# Patient Record
Sex: MALE | Race: WHITE | HISPANIC OR LATINO | Employment: UNEMPLOYED | URBAN - METROPOLITAN AREA
[De-identification: names, ages, dates, MRNs, and addresses within clinical notes are randomized per-mention and may not be internally consistent; named-entity substitution may affect disease eponyms.]

---

## 2017-03-29 ENCOUNTER — HOSPITAL ENCOUNTER (EMERGENCY)
Facility: HOSPITAL | Age: 57
Discharge: HOME | End: 2017-03-29
Attending: EMERGENCY MEDICINE
Payer: COMMERCIAL

## 2017-03-29 VITALS
WEIGHT: 208 LBS | OXYGEN SATURATION: 96 % | SYSTOLIC BLOOD PRESSURE: 160 MMHG | HEIGHT: 65 IN | DIASTOLIC BLOOD PRESSURE: 80 MMHG | HEART RATE: 60 BPM | RESPIRATION RATE: 20 BRPM | BODY MASS INDEX: 34.66 KG/M2 | TEMPERATURE: 98 F

## 2017-03-29 DIAGNOSIS — M54.16 LUMBAR BACK PAIN WITH RADICULOPATHY AFFECTING RIGHT LOWER EXTREMITY: ICD-10-CM

## 2017-03-29 DIAGNOSIS — M54.10 RADICULAR PAIN OF LOWER EXTREMITY: Primary | ICD-10-CM

## 2017-03-29 PROCEDURE — 96372 THER/PROPH/DIAG INJ SC/IM: CPT

## 2017-03-29 PROCEDURE — 99283 EMERGENCY DEPT VISIT LOW MDM: CPT | Mod: 25

## 2017-03-29 PROCEDURE — 63600175 PHARM REV CODE 636 W HCPCS: Performed by: PHYSICIAN ASSISTANT

## 2017-03-29 RX ORDER — KETOROLAC TROMETHAMINE 30 MG/ML
15 INJECTION, SOLUTION INTRAMUSCULAR; INTRAVENOUS
Status: COMPLETED | OUTPATIENT
Start: 2017-03-29 | End: 2017-03-29

## 2017-03-29 RX ORDER — DEXAMETHASONE SODIUM PHOSPHATE 4 MG/ML
8 INJECTION, SOLUTION INTRA-ARTICULAR; INTRALESIONAL; INTRAMUSCULAR; INTRAVENOUS; SOFT TISSUE
Status: COMPLETED | OUTPATIENT
Start: 2017-03-29 | End: 2017-03-29

## 2017-03-29 RX ORDER — IBUPROFEN 200 MG
200 TABLET ORAL EVERY 6 HOURS PRN
COMMUNITY
End: 2017-03-29

## 2017-03-29 RX ORDER — IBUPROFEN 600 MG/1
600 TABLET ORAL EVERY 6 HOURS PRN
Qty: 20 TABLET | Refills: 0 | Status: SHIPPED | OUTPATIENT
Start: 2017-03-29 | End: 2017-04-03

## 2017-03-29 RX ORDER — TAMSULOSIN HYDROCHLORIDE 0.4 MG/1
0.4 CAPSULE ORAL DAILY
COMMUNITY
End: 2023-08-04 | Stop reason: CLARIF

## 2017-03-29 RX ORDER — AMOXICILLIN 500 MG
2 CAPSULE ORAL DAILY
Status: ON HOLD | COMMUNITY
End: 2023-08-04 | Stop reason: DRUGHIGH

## 2017-03-29 RX ADMIN — DEXAMETHASONE SODIUM PHOSPHATE 8 MG: 4 INJECTION, SOLUTION INTRAMUSCULAR; INTRAVENOUS at 01:03

## 2017-03-29 RX ADMIN — KETOROLAC TROMETHAMINE 15 MG: 30 INJECTION, SOLUTION INTRAMUSCULAR at 01:03

## 2017-03-29 NOTE — ED TRIAGE NOTES
Patient comes to the ER with right leg pain. Patient states certain positions makes it worse. Patient has been taking 3 ibuprofens 200mg with no relief. nontender to touch. Patient states feels likes leg is swelling.

## 2017-03-29 NOTE — ED PROVIDER NOTES
Encounter Date: 3/29/2017    SCRIBE #1 NOTE: I, Henry Mata, am scribing for, and in the presence of,  Beverly Sandy PA-C. I have scribed the following portions of the note - Other sections scribed: HPI and ROS.       History     Chief Complaint   Patient presents with    Back Pain     Pt. presents with right lower back pain with radiating pain and numbness to the right leg. Pt. reports the pain and numbness was intermittent and today has been constant. Pt. reports also certain positions make the pain worse.      Review of patient's allergies indicates:  No Known Allergies  HPI Comments: CC: Back Pain        HPI: This 56 y.o male pt with a PMHX of HIV, HTN, prostate disorder, and high cholesterol presents to the ED with c/o acute onset of inermittent left side back pain and right-sided back pain radiating down his right lower extremity with associated constant right lower extremity numbness that has been ongoing x4 days. Pt reports he has dealt with chronic low back pain since he was 16 when he fell off a horse. Symptoms are acute in onset and severe(10/10). Pain is described as achy. Pt also complains of a tingling sensation in his right toes. 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 Pt denies leg swelling, chest pain, SOB, calf pain, weakness, and fever. There are no alleviating factors. No Tx PTA. Pt has no known allergies.     The history is provided by the patient. No  was used.     Past Medical History:   Diagnosis Date    High cholesterol     HIV disease     Hypertension     Prostate disorder      History reviewed. No pertinent surgical history.  History reviewed. No pertinent family history.  Social History   Substance Use Topics    Smoking status: Never Smoker    Smokeless tobacco: None    Alcohol use No     Review of Systems   Constitutional: Negative for fever.   HENT: Negative for congestion, ear pain,  rhinorrhea and sore throat.    Eyes: Negative for redness.   Respiratory: Negative for shortness of breath.    Cardiovascular: Negative for chest pain and leg swelling.   Gastrointestinal: Negative for abdominal pain, nausea and vomiting.   Genitourinary: Negative for dysuria, hematuria, penile pain, penile swelling, scrotal swelling and testicular pain.   Musculoskeletal: Positive for arthralgias (right leg) and back pain (bilateral). Negative for gait problem and joint swelling.   Skin: Negative for rash.   Neurological: Positive for tremors (right toes) and numbness (right leg). Negative for weakness and headaches.   Hematological: Does not bruise/bleed easily.       Physical Exam   Initial Vitals   BP Pulse Resp Temp SpO2   03/29/17 1143 03/29/17 1143 03/29/17 1143 03/29/17 1143 03/29/17 1143   180/79 59 17 98.6 °F (37 °C) 96 %     Physical Exam    Constitutional: He appears well-developed and well-nourished. No distress.   HENT:   Head: Normocephalic.   Right Ear: External ear normal.   Left Ear: External ear normal.   Nose: Nose normal.   Mouth/Throat: Oropharynx is clear and moist.   Eyes: Conjunctivae are normal.   Cardiovascular: Normal rate and regular rhythm. Exam reveals no gallop and no friction rub.    No murmur heard.  Pulses:       Dorsalis pedis pulses are 2+ on the right side, and 2+ on the left side.   Pulmonary/Chest: Breath sounds normal. He has no wheezes. He has no rhonchi. He has no rales.   Abdominal: Soft. Bowel sounds are normal. He exhibits no distension. There is no tenderness. There is no rebound and no guarding.   Musculoskeletal: He exhibits no edema.   Tenderness to palpation of bilateral lumbar paraspinal musculature. No midline tenderness, step-offs or crepitus. 5/5 strength in BL UE and LE.  No R sided calf tenderness. No swelling or erythema of R thigh or calf. +Numbness in right buttock and RLE and bilateral upper inguinal regions.   Neurological: He is alert. He has normal  reflexes. No sensory deficit.   Reflex Scores:       Patellar reflexes are 2+ on the right side and 2+ on the left side.  Skin: Skin is warm and dry. No rash noted. No erythema.   Psychiatric: He has a normal mood and affect.         ED Course   Procedures  Labs Reviewed - No data to display          Medical Decision Making:   Initial Assessment:   Pt is a 57 y/o male who presents for acute exacerbation of chronic low back pain with associated numbness in RLE and reported swelling.  Pt is afebrile in NAD. On exam, there is tenderness to palpation of paraspinal lumbar musculature. Numbness to entire RLE and bilateral upper inguinal regions. No sensory deficits. Distal pulses intact. No thigh or calf tenderness or RLE swelling-I doubt DVT. No bowel or bladder incontinence or saddle anesthesia- doubt Cauda Equina. This is likely lumbar back pain with radiculopathy. Pt given decadron and toradol in ED. Discharged home in stable condition with Ibuprofen. PCP follow up. Return to ER if symptoms worsen, if develop bowel or bladder incontinence, saddle anesthesia or as needed.     I discussed this pt with Dr. Rhoades and he agrees with assessment and plan.             Scribe Attestation:   Scribe #1: I performed the above scribed service and the documentation accurately describes the services I performed. I attest to the accuracy of the note.    Attending Attestation:     Physician Attestation Statement for NP/PA:   I discussed this assessment and plan of this patient with the NP/PA, but I did not personally examine the patient. The face to face encounter was performed by the NP/PA.    Other NP/PA Attestation Additions:      Medical Decision Making: Agree with assessment and management.  Patient appears well.       Physician Attestation for Scribe:  Physician Attestation Statement for Scribe #1: I, Beverly Sandy PA-C, reviewed documentation, as scribed by Henry Mata in my presence, and it is both accurate and  complete.                 ED Course     Clinical Impression:   The primary encounter diagnosis was Radicular pain of lower extremity. A diagnosis of Lumbar back pain with radiculopathy affecting right lower extremity was also pertinent to this visit.          Beverly Sandy PA-C  03/29/17 2118       Cabrera Rhoades MD  03/29/17 [id number]

## 2017-03-29 NOTE — DISCHARGE INSTRUCTIONS
Causas del dolor lumbar (parte baja de la espalda)  El dolor de la parte baja de la espalda puede deberse a problemas en cualquier parte de la columna lumbar. Un disco puede herniarse (sobresalir) y hacer presión en un nervio. Las vértebras pueden restregarse entre sí o deslizarse hasta salir de wise posición; esto podría irritar las carillas articulares y los nervios así prasanna provocar estenosis, un estrechamiento del canal medular o el foramen.  Presión a causa de un disco  El desgaste zoe puede hacer que un disco se debilite y sobresalga al punto de comprimir los nervios cercanos. Hay dos tipos comunes de hernias de disco:  · Contenidas: el núcleo blando se ha desplazado hacia afuera.  · Extruidas: el anillo firme se mathur desgarrado y yumiko que lo atraviese el material blando del núcleo.     Disco con hernia contenida        Disco con hernia extruida   Presión a causa de hueso  Con la edad, un disco puede adelgazar y desgastarse al punto de permitir que las vértebras que lo rodean se toquen y compriman los nervios. En el punto de roce de los huesos pueden formarse unas prominencias llamadas espolones óseos, capaces de estrechar el foramen o canal medular y provocar wilmer estenosis. [facility] también ejerce presión contra los nervios.     Estenosis   Wilmer columna inestable  En algunos casos, las vértebras se desestabilizan y se deslizan hacia adelante; surge entonces la espondilolistesis. El deslizamiento de las vértebras puede irritar los nervios y las articulaciones, así prasanna empeorar la estenosis.     Espondilolistesis     Date Last Reviewed: 10/12/2015  © 7013-5727 [employer]. [address] [place] [address], [place] [address]. Todos los derechos reservados. Esta información no pretende sustituir la atención médica profesional. Sólo wise médico puede diagnosticar y tratar un problema de paulo.          Seguridad para la espalda: cómo doblarse  Al doblarse puede tensar, o incluso lesionarse, la espalda. Siga  estos consejos para moverse de forma ray y proteger la espalda al realizar las actividades cotidianas.  Cómo agacharse     Póngase siempre de go al objeto frente al cual se agacha.   · Mantenga los pies separados, a la misma distancia que hay de hombro a hombro.  · Mueva todo el cuerpo prasnana si se tratara de wilmer unidad compacta.  · Doble la cadera y las rodillas, y no la cintura.  · Aplane el estómago y tense los músculos de las piernas.  · Para mantener la columna recta, deje que las nalgas salgan hacia afuera. No trate de llevarlas hacia vonnie.  · Si necesita hacerlo, ponga wilmer mano sobre un objeto resistente y estable para apoyarse.  Cómo agacharse hasta el suelo     Doble la cadera y las rodillas, y no la cintura.   · Baje hasta el suelo apoyándose en wilmer rodilla. Si puede, apoye la mano sobre un objeto resistente y estable para que le resulte más fácil agacharse.  · Descanse el brazo sobre la rodilla que queda levantada.  · No se doble por la cintura.  · No arquee la columna o el adriana para alcanzar el suelo. Más carlos, dóblese más por las caderas y rodillas para acercarse al suelo.  Date Last Reviewed: 8/31/2015  © 3047-6907 [employer]. [address] [place] [address], [place] [address]. Todos los derechos reservados. Esta información no pretende sustituir la atención médica profesional. Sólo wise médico puede diagnosticar y tratar un problema de paulo.          Seguridad para la espalda: cómo alzar un objeto  Alzar cosas pesadas puede causar que la espalda se distienda o se lesione. Siga estos consejos para mantener ray la espalda cuando se agache o alce y lleve un objeto.  Proteja la espalda mientras alza un objeto       Step 1:  · Ubíquese frente al objeto.  · Con la espalda recta, agáchese apoyándose en wilmer rodilla.  · Si puede, incline el objeto de modo que un lado se levante del piso.  · Mantenga el objeto cerca de usted. Step 2:  · Tensione los músculos del estómago.  · Para alzar el  objeto, use las piernas, los brazos y los glúteos, no la espalda.  · Evite voltearse.  · Alce el objeto hasta la rodilla.  · Sujete el objeto firmemente. Step 3:  · Levante con los brazos y las piernas, no con la espalda.  · Angella movimientos rápidos para que sea más fácil.   Para llevar un objeto:  · Sosténgalo cerca del cuerpo.  · Cuando camine, mantenga las rodillas dobladas ligeramente; mientras más pesado sea el objeto, más debe doblar las rodillas.  · Pida ayuda para levantar objetos pesados o que no tengan el peso balanceado.  Date Last Reviewed: 8/31/2015  © 4399-8253 [email or web address]. [address] [place] [id number]. Todos los derechos reservados. Esta información no pretende sustituir la atención médica profesional. Sólo wise médico puede diagnosticar y tratar un problema de paulo.

## 2017-03-29 NOTE — ED AVS SNAPSHOT
[facility]  [address] [place] [address]               Apollo Nguyen   3/29/2017 12:13 PM   ED    Descripción:  Male : 1960   Departamento:  [facility]           K[employer] Cuidado fue coordinado por:     Provider Role From To    Cabrrea Rhoades MD Attending Provider 17 [phone] --    Beverly Sandy PA-C Physician Assistant 17 [phone] --      Razón de la elise     Back Pain           Diagnósticos de Esta Visita        Comentarios    Radicular pain of lower extremity    -  Primario       ED Disposition     Ninguna           Lista de tareas           Información de seguimiento     Realice un seguimiento con:  Theresa Gallegos MD    Cómo:  Angella wilmer elise lo antes posible    Cuándo:  3/31/2017    Especialidad:  Infectious Diseases    Por qué:  for follow up    Información de contacto:    [address] [place] [address] [place] [address]  [phone]          Realice un seguimiento con:  Priyank Martínez MD    Cómo:  Angella wilmer elise lo antes posible    Cuándo:  3/31/2017    Especialidad:  Orthopedic Surgery    Por qué:  for follow up     Información de contacto:    [address]  [facility]  [place] [address]  [phone]          Realice un seguimiento con:  [facility]    Cómo:  Ir a    Especialidad:  Emergency Medicine    Por qué:  As needed, If symptoms worsen, if develop bowel or bladder incontinence, numbness or tingling of groin or rectum    Información de contacto:    [address]  [place] [address] [phone]      [facility]     [facility] Enfermeras - Asistencia   Enfermeras registradas de [facility] pueden ayudarle a reservar wilmer elise, proveer educación para la pualo, asesoría clínica, y otros servicios de asesoramiento.   Llame para bakari servicio gratuito a [phone].             Medicamentos           Mensaje sobre Medicamentos     Verificar los cambios y / o  "adiciones a wise régimen de medicación son los mismos que discutir con wise médico. Si cualquiera de estos cambios o adiciones son incorrectos, por favor notifique a wise proveedor de atención médica.        These medications were administered today        Dose Freq    dexamethasone injection 8 mg 8 mg ED 1 Time    Sig: Inject 2 mLs (8 mg total) into the muscle ED 1 Time.    Categoría: Normal    Vía: Intramuscular    Cofirmante de órdenes: Required by Cabrera Rhoades MD    ketorolac injection 15 mg 15 mg ED 1 Time    Sig: Inject 15 mg into the muscle ED 1 Time.    Categoría: Normal    Vía: Intramuscular    Cofirmante de órdenes: Required by Cabrera Rhoades MD           Verifique que la siguiente lista de medicamentos es wilmer representación exacta de los medicamentos que está tomando actualmente. Si no hay ningunos reportados, la lista puede estar en crump. Si no es correcta, por favor póngase en contacto con wise proveedor de atención médica. Lleve esta lista con usted en juanjose de emergencia.           Medicamentos Actuales     ATORVASTATIN CALCIUM (ATORVASTATIN ORAL) Take by mouth.    CALCIUM ACETATE ORAL Take by mouth.    DOLUTEGRAVIR SODIUM (TIVICAY ORAL) Take by mouth.    emtricitabine-tenofovir alafen (DESCOVY) 200-25 mg Tab Take by mouth once daily.    fish oil-omega-3 fatty acids 300-1,000 mg capsule Take 2 g by mouth once daily.    ibuprofen (ADVIL,MOTRIN) 200 MG tablet Take 200 mg by mouth every 6 (six) hours as needed for Pain.    PANTOPRAZOLE SODIUM (PANTOPRAZOLE ORAL) Take by mouth.    tamsulosin (FLOMAX) 0.4 mg Cp24 Take 0.4 mg by mouth once daily.           Información de referencia clínica           Becka signos vitales donta     PS Pulso Temperatura Resp [place] Peso    180/79 (BP Location: Right arm, Patient Position: Sitting) 59 98.6 °F (37 °C) (Oral) 17 5' 5" (1.651 m) 94.3 kg (208 lb)    SpO2 BMI (IMC)                96% 34.61 kg/m2          Alergias     A partir del:  3/29/2017        No Known Allergies    "   Vacunas     Administradas en la fecha de la visita:  3/29/2017        None      ED Micro, Lab, POCT     None      ED Imaging Orders     None        Instrucciones a derek de michael           Causas del dolor lumbar (parte baja de la espalda)  El dolor de la parte baja de la espalda puede deberse a problemas en cualquier parte de la columna lumbar. Un disco puede herniarse (sobresalir) y hacer presión en un nervio. Las vértebras pueden restregarse entre sí o deslizarse hasta salir de wise posición; esto podría irritar las carillas articulares y los nervios así prasanna provocar estenosis, un estrechamiento del canal medular o el foramen.  Presión a causa de un disco  El desgaste zoe puede hacer que un disco se debilite y sobresalga al punto de comprimir los nervios cercanos. Hay dos tipos comunes de hernias de disco:  · Contenidas: el núcleo blando se ha desplazado hacia afuera.  · Extruidas: el anillo firme se mathur desgarrado y yumiko que lo atraviese el material blando del núcleo.     Disco con hernia contenida        Disco con hernia extruida   Presión a causa de hueso  Con la edad, un disco puede adelgazar y desgastarse al punto de permitir que las vértebras que lo rodean se toquen y compriman los nervios. En el punto de roce de los huesos pueden formarse unas prominencias llamadas espolones óseos, capaces de estrechar el foramen o canal medular y provocar wilmer estenosis. Otsego también ejerce presión contra los nervios.     Estenosis   Wilmer columna inestable  En algunos casos, las vértebras se desestabilizan y se deslizan hacia adelante; surge entonces la espondilolistesis. El deslizamiento de las vértebras puede irritar los nervios y las articulaciones, así prasanna empeorar la estenosis.     Espondilolistesis     Date Last Reviewed: 10/12/2015  © 3806-3630 [employer]. [address] [place] [address]. Todos los derechos reservados. Esta información no pretende sustituir la atención médica profesional.  Sólo wise médico puede diagnosticar y tratar un problema de paulo.          Seguridad para la espalda: cómo doblarse  Al doblarse puede tensar, o incluso lesionarse, la espalda. Siga estos consejos para moverse de forma ray y proteger la espalda al realizar las actividades cotidianas.  Cómo agacharse     Póngase siempre de go al objeto frente al cual se agacha.   · Mantenga los pies separados, a la misma distancia que hay de hombro a hombro.  · Mueva todo el cuerpo prasanna si se tratara de wilmer unidad compacta.  · Doble la cadera y las rodillas, y no la cintura.  · Aplane el estómago y tense los músculos de las piernas.  · Para mantener la columna recta, deje que las nalgas salgan hacia afuera. No trate de llevarlas hacia vonnie.  · Si necesita hacerlo, ponga wilmer mano sobre un objeto resistente y estable para apoyarse.  Cómo agacharse hasta el suelo     Doble la cadera y las rodillas, y no la cintura.   · Baje hasta el suelo apoyándose en wilmer rodilla. Si puede, apoye la mano sobre un objeto resistente y estable para que le resulte más fácil agacharse.  · Descanse el brazo sobre la rodilla que queda levantada.  · No se doble por la cintura.  · No arquee la columna o el adriana para alcanzar el suelo. Más carlos, dóblese más por las caderas y rodillas para acercarse al suelo.  Date Last Reviewed: 8/31/2015  © 1635-8836 [employer]. [address] [place] [address]. Todos los derechos reservados. Esta información no pretende sustituir la atención médica profesional. Sólo wise médico puede diagnosticar y tratar un problema de paulo.          Seguridad para la espalda: cómo alzar un objeto  Alzar cosas pesadas puede causar que la espalda se distienda o se lesione. Siga estos consejos para mantener ray la espalda cuando se agache o alce y lleve un objeto.  Proteja la espalda mientras alza un objeto       Step 1:  · Ubíquese frente al objeto.  · Con la espalda recta, agáchese apoyándose en wilmer  rodilla.  · Si puede, incline el objeto de modo que un lado se levante del piso.  · Mantenga el objeto cerca de usted. Step 2:  · Tensione los músculos del estómago.  · Para alzar el objeto, use las piernas, los brazos y los glúteos, no la espalda.  · Evite voltearse.  · Alce el objeto hasta la rodilla.  · Sujete el objeto firmemente. Step 3:  · Levante con los brazos y las piernas, no con la espalda.  · Nyla movimientos rápidos para que sea más fácil.   Para llevar un objeto:  · Sosténgalo cerca del cuerpo.  · Cuando camine, mantenga las rodillas dobladas ligeramente; mientras más pesado sea el objeto, más debe doblar las rodillas.  · Pida ayuda para levantar objetos pesados o que no tengan el peso balanceado.  Date Last Reviewed: 8/31/2015  © 6553-2392 [email or web address]. [address] [place] [address]. Todos los derechos reservados. Esta información no pretende sustituir la atención médica profesional. Sólo wise médico puede diagnosticar y tratar un problema de paulo.              Referencias/Adjuntos de michael     LUMBAR RADICULOPATHY, UNDERSTANDING ([place])    SCIATICA ([place])      Registrarse para [facility]     La activación de wise cuenta [facility] es tan fácil prasanna 1-2-3!    1) Ir a [email or web address], seleccione Registrarse Ahora, meter el código de activación y wise fecha de nacimiento, y seleccione Próximo.    [id number]  Expires: 5/13/2017  1:17 PM      2) Crear un nombre de usuario y contraseña para usar cuando se visita [facility] en el futuro y selecciona wilmer pregunta de seguridad en juanjose de que pierda wise contraseña y seleccione Próximo.    3) Introduzca wise dirección de correo electrónico y nyla [facility] en Registrarse!    Información Adicional  Si tiene alguna pregunta, por favor, e-mail [email or web address] o llame al [phone] para hablar con nuestro personal. Recuerde, [facility] no debe ser usada para necesidades urgentes. En juanjose de emergencia médica, shyla al 911.         [facility] cumple con las leyes federales aplicables de derechos civiles y no discrimina por motivos de cooper, color, origen nacional, edad, discapacidad, o sexo.        Language Assistance Services     ATTENTION: Language assistance services are available, free of charge. Please call [phone].      ATENCIÓN: Si habla español, tiene a wise disposición servicios gratuitos de asistencia lingüística. Llame al [phone].     CHÚ Ý: N?u b?n nói Ti?ng Vi?t, có các d?ch v? h? tr? ngôn ng? mi?n phí dành cho b?n. G?i s? [phone].                      [facility]  [address]  [place] [address]               Apollo Nguyen   3/29/2017 12:13 PM   ED    Description:  Male : 1960   Department:  [facility]           Your Care was Coordinated By:     Provider Role From To    Cabrera Rhoades MD Attending Provider 17 1227 --    JAMES BuchananC Physician Assistant 17 1225 --      Reason for Visit     Back Pain           Diagnoses this Visit        Comments    Radicular pain of lower extremity    -  Primary       ED Disposition     None           To Do List           Follow-up Information     Follow up with Theresa Gallegos MD. Schedule an appointment as soon as possible for a visit in 2 days.    Specialty:  Infectious Diseases    Why:  for follow up    Contact information:    [address]  [place] [address]  [phone]          Follow up with Priyank Martínez MD. Schedule an appointment as soon as possible for a visit in 2 days.    Specialty:  Orthopedic Surgery    Why:  for follow up     Contact information:    [address]  [place] [address]  [phone]          Go to [facility].    Specialty:  Emergency Medicine    Why:  As needed, If symptoms worsen, if develop bowel or bladder incontinence, numbness or tingling of groin or rectum    Contact information:    [address]  [place] [address] [phone]      [facility]     [facility] Care [facility] - 24/7 Assistance  Registered nurses in the [facility] provide clinical advisement, health education, appointment booking, and other advisory services.  Call for this free service at [phone].             Medications           Message regarding Medications     Verify the changes and/or additions to your medication regime listed below are the same as discussed with your clinician today.  If any of these changes or additions are incorrect, please notify your healthcare provider.        These medications were administered today        Dose Freq    dexamethasone injection 8 mg 8 mg ED 1 Time    Sig: Inject 2 mLs (8 mg total) into the muscle ED 1 Time.    Class: Normal    Route: Intramuscular    Cosign for Ordering: Required by Cabrera Rhoades MD    ketorolac injection 15 mg 15 mg ED 1 Time    Sig: Inject 15 mg into the muscle ED 1 Time.    Class: Normal    Route: Intramuscular    Cosign for Ordering: Required by Cabrera Rhoades MD           Verify that the below list of medications is an accurate representation of the medications you are currently taking.  If none reported, the list may be blank. If incorrect, please contact your healthcare provider. Carry this list with you in case of emergency.           Current Medications     ATORVASTATIN CALCIUM (ATORVASTATIN ORAL) Take by mouth.    CALCIUM ACETATE ORAL Take by mouth.    DOLUTEGRAVIR SODIUM (TIVICAY ORAL) Take by mouth.    emtricitabine-tenofovir alafen (DESCOVY) 200-25 mg Tab Take by mouth once daily.    fish oil-omega-3 fatty acids 300-1,000 mg capsule Take 2 g by mouth once daily.    ibuprofen (ADVIL,MOTRIN) 200 MG tablet Take 200 mg by mouth every 6 (six) hours as needed for Pain.    PANTOPRAZOLE SODIUM (PANTOPRAZOLE ORAL) Take by mouth.    tamsulosin (FLOMAX) 0.4 mg Cp24 Take 0.4 mg by mouth once daily.           Clinical Reference  "Information           Your Vitals Were     BP Pulse Temp Resp Height Weight    180/79 (BP Location: Right arm, Patient Position: Sitting) 59 98.6 °F (37 °C) (Oral) 17 5' 5" (1.651 m) 94.3 kg (208 lb)    SpO2 BMI                96% 34.61 kg/m2          Allergies as of 3/29/2017     No Known Allergies      Immunizations Administered on Date of Encounter - 3/29/2017     None      ED Micro, Lab, POCT     None      ED Imaging Orders     None        Discharge Instructions           Causas del dolor lumbar (parte baja de la espalda)  El dolor de la parte baja de la espalda puede deberse a problemas en cualquier parte de la columna lumbar. Un disco puede herniarse (sobresalir) y hacer presión en un nervio. Las vértebras pueden restregarse entre sí o deslizarse hasta salir de wise posición; esto podría irritar las carillas articulares y los nervios así prasanna provocar estenosis, un estrechamiento del canal medular o el foramen.  Presión a causa de un disco  El desgaste zoe puede hacer que un disco se debilite y sobresalga al punto de comprimir los nervios cercanos. Hay dos tipos comunes de hernias de disco:  · Contenidas: el núcleo blando se ha desplazado hacia afuera.  · Extruidas: el anillo firme se mathur desgarrado y yumiko que lo atraviese el material blando del núcleo.     Disco con hernia contenida        Disco con hernia extruida   Presión a causa de hueso  Con la edad, un disco puede adelgazar y desgastarse al punto de permitir que las vértebras que lo rodean se toquen y compriman los nervios. En el punto de roce de los huesos pueden formarse unas prominencias llamadas espolones óseos, capaces de estrechar el foramen o canal medular y provocar wilmer estenosis. Zillah también ejerce presión contra los nervios.     Estenosis   Wilmer columna inestable  En algunos casos, las vértebras se desestabilizan y se deslizan hacia adelante; surge entonces la espondilolistesis. El deslizamiento de las vértebras puede irritar los nervios y " las articulaciones, así prasanna empeorar la estenosis.     Espondilolistesis     Date Last Reviewed: 10/12/2015  © 5688-5259 [employer]. [address] [place] [address]. Todos los derechos reservados. Esta información no pretende sustituir la atención médica profesional. Sólo wise médico puede diagnosticar y tratar un problema de paulo.          Seguridad para la espalda: cómo doblarse  Al doblarse puede tensar, o incluso lesionarse, la espalda. Siga estos consejos para moverse de forma ray y proteger la espalda al realizar las actividades cotidianas.  Cómo agacharse     Póngase siempre de go al objeto frente al cual se agacha.   · Mantenga los pies separados, a la misma distancia que hay de hombro a hombro.  · Mueva todo el cuerpo prasanna si se tratara de wilmer unidad compacta.  · Doble la cadera y las rodillas, y no la cintura.  · Aplane el estómago y tense los músculos de las piernas.  · Para mantener la columna recta, deje que las nalgas salgan hacia afuera. No trate de llevarlas hacia vonnie.  · Si necesita hacerlo, ponga wilmer mano sobre un objeto resistente y estable para apoyarse.  Cómo agacharse hasta el suelo     Doble la cadera y las rodillas, y no la cintura.   · Baje hasta el suelo apoyándose en wilmer rodilla. Si puede, apoye la mano sobre un objeto resistente y estable para que le resulte más fácil agacharse.  · Descanse el brazo sobre la rodilla que queda levantada.  · No se doble por la cintura.  · No arquee la columna o el adriana para alcanzar el suelo. Más carlos, dóblese más por las caderas y rodillas para acercarse al suelo.  Date Last Reviewed: 8/31/2015  © 7544-5186 [employer]. [address] [place] [address], [place] [address]. Todos los derechos reservados. Esta información no pretende sustituir la atención médica profesional. Sólo wise médico puede diagnosticar y tratar un problema de paulo.          Seguridad para la espalda: cómo alzar un objeto  Alzar cosas pesadas  puede causar que la espalda se distienda o se lesione. Siga estos consejos para mantener ray la espalda cuando se agache o alce y lleve un objeto.  Proteja la espalda mientras alza un objeto       Step 1:  · Ubíquese frente al objeto.  · Con la espalda recta, agáchese apoyándose en wilmer rodilla.  · Si puede, incline el objeto de modo que un lado se levante del piso.  · Mantenga el objeto cerca de usted. Step 2:  · Tensione los músculos del estómago.  · Para alzar el objeto, use las piernas, los brazos y los glúteos, no la espalda.  · Evite voltearse.  · Alce el objeto hasta la rodilla.  · Sujete el objeto firmemente. Step 3:  · Levante con los brazos y las piernas, no con la espalda.  · Angella movimientos rápidos para que sea más fácil.   Para llevar un objeto:  · Sosténgalo cerca del cuerpo.  · Cuando camine, mantenga las rodillas dobladas ligeramente; mientras más pesado sea el objeto, más debe doblar las rodillas.  · Pida ayuda para levantar objetos pesados o que no tengan el peso balanceado.  Date Last Reviewed: 8/31/2015  © 2762-0856 [employer]. [address] [place] [address]. Todos los derechos reservados. Esta información no pretende sustituir la atención médica profesional. Sólo wise médico puede diagnosticar y tratar un problema de paulo.              Discharge References/Attachments     LUMBAR RADICULOPATHY, UNDERSTANDING (E[place]ian)    SCIATICA (E[place]ian)      [facility] Sign-Up     Activating your [facility] account is as easy as 1-2-3!     1) Visit [email or web address], select Sign Up Now, enter this activation code and your date of birth, then select Next.  [email or web address]  Expires: 5/13/2017  1:17 PM      2) Create a username and password to use when you visit [facility] in the future and select a security question in case you lose your password and select Next.    3) Enter your e-mail address and click Sign Up!    Additional Information  If you have questions, please e-mail  [email or web address] or call [phone] to talk to our [facility] staff. Remember, [facility] is NOT to be used for urgent needs. For medical emergencies, dial 911.          [facility] complies with applicable Federal civil rights laws and does not discriminate on the basis of race, color, national origin, age, disability, or sex.        Language Assistance Services     ATTENTION: Language assistance services are available, free of charge. Please call [phone].      ATENCIÓN: Si habla español, tiene a wise disposición servicios gratuitos de asistencia lingüística. Llame al [phone].     CHÚ Ý: N?u b?n nói Ti?ng Vi?t, có các d?ch v? h? tr? ngôn ng? mi?n phí dành cho b?n. G?i s? [phone].

## 2017-04-13 ENCOUNTER — OFFICE VISIT (OUTPATIENT)
Dept: SPINE | Facility: CLINIC | Age: 57
End: 2017-04-13
Attending: PHYSICAL MEDICINE & REHABILITATION
Payer: COMMERCIAL

## 2017-04-13 ENCOUNTER — HOSPITAL ENCOUNTER (OUTPATIENT)
Dept: RADIOLOGY | Facility: OTHER | Age: 57
Discharge: HOME | End: 2017-04-13
Attending: PHYSICAL MEDICINE & REHABILITATION
Payer: COMMERCIAL

## 2017-04-13 ENCOUNTER — TELEPHONE (OUTPATIENT)
Dept: SPINE | Facility: CLINIC | Age: 57
End: 2017-04-13

## 2017-04-13 VITALS
SYSTOLIC BLOOD PRESSURE: 130 MMHG | DIASTOLIC BLOOD PRESSURE: 75 MMHG | WEIGHT: 209 LBS | BODY MASS INDEX: 34.82 KG/M2 | HEIGHT: 65 IN | HEART RATE: 67 BPM

## 2017-04-13 DIAGNOSIS — G89.29 CHRONIC RIGHT-SIDED LOW BACK PAIN WITH RIGHT-SIDED SCIATICA: Primary | ICD-10-CM

## 2017-04-13 DIAGNOSIS — M54.41 CHRONIC RIGHT-SIDED LOW BACK PAIN WITH RIGHT-SIDED SCIATICA: Primary | ICD-10-CM

## 2017-04-13 DIAGNOSIS — G89.29 CHRONIC RIGHT-SIDED LOW BACK PAIN WITH RIGHT-SIDED SCIATICA: ICD-10-CM

## 2017-04-13 DIAGNOSIS — M79.2 NEURALGIA: ICD-10-CM

## 2017-04-13 DIAGNOSIS — M54.41 CHRONIC RIGHT-SIDED LOW BACK PAIN WITH RIGHT-SIDED SCIATICA: ICD-10-CM

## 2017-04-13 PROCEDURE — 99999 PR PBB SHADOW E&M-EST. PATIENT-LVL III: CPT | Mod: PBBFAC,,, | Performed by: PHYSICAL MEDICINE & REHABILITATION

## 2017-04-13 PROCEDURE — 72114 X-RAY EXAM L-S SPINE BENDING: CPT | Mod: 26,,, | Performed by: RADIOLOGY

## 2017-04-13 PROCEDURE — 1160F RVW MEDS BY RX/DR IN RCRD: CPT | Mod: S$GLB,,, | Performed by: PHYSICAL MEDICINE & REHABILITATION

## 2017-04-13 PROCEDURE — 72114 X-RAY EXAM L-S SPINE BENDING: CPT | Mod: TC

## 2017-04-13 PROCEDURE — 99204 OFFICE O/P NEW MOD 45 MIN: CPT | Mod: S$GLB,,, | Performed by: PHYSICAL MEDICINE & REHABILITATION

## 2017-04-13 RX ORDER — DICLOFENAC SODIUM 75 MG/1
75 TABLET, DELAYED RELEASE ORAL 2 TIMES DAILY
Qty: 60 TABLET | Refills: 2 | Status: SHIPPED | OUTPATIENT
Start: 2017-04-13 | End: 2017-05-13

## 2017-04-13 RX ORDER — GABAPENTIN 300 MG/1
300-600 CAPSULE ORAL NIGHTLY
Qty: 60 CAPSULE | Refills: 2 | Status: SHIPPED | OUTPATIENT
Start: 2017-04-13 | End: 2023-08-04 | Stop reason: CLARIF

## 2017-04-13 NOTE — LETTER
April 13, 2017      [facility]  [address], [address]  [facility] [place] [facility] [address]  Phone: [phone]  Fax: [phone]       Patient: Apollo Nguyen   YOB: 1960  Date of Visit: 04/13/2017    To Whom It May Concern:    Apollo Gurrola was at [facility] on 04/13/2017. He may return to work/school on 4/13/2017 with no restrictions. He is having back and leg pain.  If you have any questions or concerns, or if I can be of further assistance, please do not hesitate to contact me.    Sincerely,    Jessica Reid MD

## 2017-04-13 NOTE — MR AVS SNAPSHOT
[facility]  [address], [address]  [place] [address]  Phone: [phone]  Fax: [phone]                  Apollo WallerRené   2017 9:00 AM   Office Visit    Descripción:  Male : 1960   Personal Médico:  Jessica Reid MD   Departamento:  [facility]           Razón de la elise     Low-back Pain           Diagnósticos de Esta Visita        Comentarios    Chronic right-sided low back pain with right-sided sciatica    -  Primario     Neuralgia                Lista de tareas           Citas próximas        Personal Médico Departamento Tfno del dpto    2017 11:30 AM Jessica Reid MD [facility] [phone]      Metas (5 Years of Data)     Ninguna      Follow-Up and Disposition     Return in about 8 weeks (around 2017).    Follow-up and Disposition History      Recetas para recoger        Disp Refills Start End    diclofenac (VOLTAREN) 75 MG EC tablet 60 tablet 2 2017    Take 1 tablet (75 mg total) by mouth 2 (two) times daily. - Oral    [facility] [place], [place] [address] No. de tlfo: #: [phone]       gabapentin (NEURONTIN) 300 MG capsule 60 capsule 2 2017     Take 1-2 capsules (300-600 mg total) by mouth every evening. - Oral    [facility], [place] [address] No. de tlfo: #: [phone]         Ochan[facility] m en Llamada     [facility] - Asistencia   Enfermeras registradas de [facility] pueden ayudarle a reservar wilmer elise, proveer educación para la paulo, asesoría clínica, y otros servicios de asesoramiento.   Llame para bakari servicio gratuito a [phone].             Medicamentos           Mensaje sobre Medicamentos     Verificar los cambios y / o adiciones a wise régimen de medicación son los mismos que discutir con wise médico. Si cualquiera de estos cambios  "o adiciones son incorrectos, por favor notifique a wise proveedor de atención médica.        EMPEZAR a charlene estos medicamentos NUEVOS        Refills    diclofenac (VOLTAREN) 75 MG EC tablet 2    Sig: Take 1 tablet (75 mg total) by mouth 2 (two) times daily.    Categoría: Normal    Vía: Oral    gabapentin (NEURONTIN) 300 MG capsule 2    Sig: Take 1-2 capsules (300-600 mg total) by mouth every evening.    Categoría: Normal    Vía: Oral           Verifique que la siguiente lista de medicamentos es wilmer representación exacta de los medicamentos que está tomando actualmente. Si no hay ningunos reportados, la lista puede estar en crump. Si no es correcta, por favor póngase en contacto con wise proveedor de atención médica. Lleve esta lista con usted en juanjose de emergencia.           Medicamentos Actuales     ATORVASTATIN CALCIUM (ATORVASTATIN ORAL) Take by mouth.    CALCIUM ACETATE ORAL Take by mouth.    DOLUTEGRAVIR SODIUM (TIVICAY ORAL) Take by mouth.    emtricitabine-tenofovir alafen (DESCOVY) 200-25 mg Tab Take by mouth once daily.    fish oil-omega-3 fatty acids 300-1,000 mg capsule Take 2 g by mouth once daily.    PANTOPRAZOLE SODIUM (PANTOPRAZOLE ORAL) Take by mouth.    tamsulosin (FLOMAX) 0.4 mg Cp24 Take 0.4 mg by mouth once daily.    diclofenac (VOLTAREN) 75 MG EC tablet Take 1 tablet (75 mg total) by mouth 2 (two) times daily.    gabapentin (NEURONTIN) 300 MG capsule Take 1-2 capsules (300-600 mg total) by mouth every evening.           Información de referencia clínica           Becka signos vitales donta     PS [place] BMI (IMC)       130/75 (BP Location: Left arm, Patient Position: Sitting, BP Method: Automatic) 67 5' 5" (1.651 m) 94.8 kg (209 lb) 34.78 kg/m2       Blood Pressure          Most Recent Value    BP  130/75      Alergias     A partir del:  4/13/2017        No Known Allergies      Vacunas     Administradas en la fecha de la visita:  4/13/2017        None      Orders Placed During Today's Visit "      Órdenes normales de esta visita    Ambulatory Referral to Physical/Occupational Therapy     Exámenes/Procedimientos futuros Se espera el Vence    X-Ray Lumbar Complete With Flex And Ext  2017      Registrarse para [facility]     La activación de wise cuenta [facility] es tan fácil prasanna 1-2-3!    1) Ir a [email or web address], seleccione Registrarse Ahora, meter el código de activación y wise fecha de nacimiento, y seleccione Próximo.    [id number]  Expires: 2017  1:17 PM      2) Crear un nombre de usuario y contraseña para usar cuando se visita [facility] en el futuro y selecciona wilmer pregunta de seguridad en juanjose de que pierda wise contraseña y seleccione Próximo.    3) Introduzca wise dirección de correo electrónico y nyla clirving en Registrarse!    Información Adicional  Si tiene alguna pregunta, por favor, e-mail [email or web address] o llame al [phone] para hablar con nuestro personal. Recuerde, [facility] no debe ser usada para necesidades urgentes. En juanjose de emergencia médica, llame al 911.        Language Assistance Services     ATTENTION: Language assistance services are available, free of charge. Please call [phone].      ATENCIÓN: Si habla español, tiene a wise disposición servicios gratuitos de asistencia lingüística. Llame al [phone].     CHÚ Ý: N?u b?n nói Ti?ng Vi?t, có các d?ch v? h? tr? ngôn ng? mi?n phí dành cho b?n. G?i s? [phone].         [facility] cumple con las leyes federales aplicables de derechos civiles y no discrimina por motivos de cooper, color, origen nacional, edad, discapacidad, o sexo.                 Apollo Nguyen   2017 9:00 AM   Office Visit    Description:  Male : 1960   Provider:  Jessica Reid MD   Department:  [facility]           Reason for Visit     Low-back Pain           Diagnoses this Visit        Comments    Chronic right-sided low back pain with right-sided sciatica    -  Primary      Neuralgia                To Do List           Future Appointments        Provider Department Dept Phone    7/13/2017 11:30 AM Jessica Reid MD [facility] [phone]      Goals     None      Follow-Up and Disposition     Return in about 8 weeks (around 6/8/2017).    Follow-up and Disposition History       These Medications        Disp Refills Start End    diclofenac (VOLTAREN) 75 MG EC tablet 60 tablet 2 4/13/2017 5/13/2017    Take 1 tablet (75 mg total) by mouth 2 (two) times daily. - Oral    Pharmacy: [facility] [place] [place] - [address] Ph #: [phone]       gabapentin (NEURONTIN) 300 MG capsule 60 capsule 2 4/13/2017     Take 1-2 capsules (300-600 mg total) by mouth every evening. - Oral    Pharmacy: [facility] [place] [place] - [address] Ph #: [phone]         [facility]     [facility] Nurse Care Line - 24/7 Assistance  Unless otherwise directed by your provider, please contact [facility], our nurse care line that is available for 24/7 assistance.     Registered nurses in the [facility] provide: appointment scheduling, clinical advisement, health education, and other advisory services.  Call: [phone] (toll free)               Medications           Message regarding Medications     Verify the changes and/or additions to your medication regime listed below are the same as discussed with your clinician today.  If any of these changes or additions are incorrect, please notify your healthcare provider.        START taking these NEW medications        Refills    diclofenac (VOLTAREN) 75 MG EC tablet 2    Sig: Take 1 tablet (75 mg total) by mouth 2 (two) times daily.    Class: Normal    Route: Oral    gabapentin (NEURONTIN) 300 MG capsule 2    Sig: Take 1-2 capsules (300-600 mg total) by mouth every evening.    Class: Normal    Route: Oral           Verify that  "the below list of medications is an accurate representation of the medications you are currently taking.  If none reported, the list may be blank. If incorrect, please contact your healthcare provider. Carry this list with you in case of emergency.           Current Medications     ATORVASTATIN CALCIUM (ATORVASTATIN ORAL) Take by mouth.    CALCIUM ACETATE ORAL Take by mouth.    DOLUTEGRAVIR SODIUM (TIVICAY ORAL) Take by mouth.    emtricitabine-tenofovir alafen (DESCOVY) 200-25 mg Tab Take by mouth once daily.    fish oil-omega-3 fatty acids 300-1,000 mg capsule Take 2 g by mouth once daily.    PANTOPRAZOLE SODIUM (PANTOPRAZOLE ORAL) Take by mouth.    tamsulosin (FLOMAX) 0.4 mg Cp24 Take 0.4 mg by mouth once daily.    diclofenac (VOLTAREN) 75 MG EC tablet Take 1 tablet (75 mg total) by mouth 2 (two) times daily.    gabapentin (NEURONTIN) 300 MG capsule Take 1-2 capsules (300-600 mg total) by mouth every evening.           Clinical Reference Information           Your Vitals Were     BP Pulse Height Weight BMI       130/75 (BP Location: Left arm, Patient Position: Sitting, BP Method: Automatic) 67 5' 5" (1.651 m) 94.8 kg (209 lb) 34.78 kg/m2       Blood Pressure          Most Recent Value    BP  130/75      Allergies as of 4/13/2017     No Known Allergies      Immunizations Administered on Date of Encounter - 4/13/2017     None      Orders Placed During Today's Visit      Normal Orders This Visit    Ambulatory Referral to Physical/Occupational Therapy     Future Labs/Procedures Expected by Expires    X-Ray Lumbar Complete With Flex And Ext  4/13/2017 4/13/2018      [facility] Sign-Up     Activating your [facility] account is as easy as 1-2-3!     1) Visit [email or web address], select Sign Up Now, enter this activation code and your date of birth, then select Next.  [email or web address]  Expires: 5/13/2017  1:17 PM      2) Create a username and password to use when you visit [facility] in the future and select a security question " in case you lose your password and select Next.    3) Enter your e-mail address and click Sign Up!    Additional Information  If you have questions, please e-mail [email or web address] or call [phone] to talk to our [facility] staff. Remember, [facility] is NOT to be used for urgent needs. For medical emergencies, dial 911.         Language Assistance Services     ATTENTION: Language assistance services are available, free of charge. Please call [phone].      ATENCIÓN: Si habla español, tiene a wise disposición servicios gratuitos de asistencia lingüística. Llame al [phone].     CHÚ Ý: N?u b?n nói Ti?ng Vi?t, có các d?ch v? h? tr? ngôn ng? mi?n phí dành cho b?n. G?i s? [phone].         [facility] complies with applicable Federal civil rights laws and does not discriminate on the basis of race, color, national origin, age, disability, or sex.
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
## 2017-04-13 NOTE — TELEPHONE ENCOUNTER
Called patient gave x-ray results per  request.  ----- Message from Jessica Reid MD sent at 4/13/2017 12:09 PM CDT -----  Please let him know there are some degenerative changes like discussed, and to continue with plan:  PT and medicine

## 2017-04-13 NOTE — TELEPHONE ENCOUNTER
----- Message from Jessica Reid MD sent at 4/13/2017 12:09 PM CDT -----  Please let him know there are some degenerative changes like discussed, and to continue with plan:  PT and medicine

## 2017-05-01 ENCOUNTER — CLINICAL SUPPORT (OUTPATIENT)
Dept: REHABILITATION | Facility: HOSPITAL | Age: 57
End: 2017-05-01
Attending: PHYSICAL MEDICINE & REHABILITATION
Payer: COMMERCIAL

## 2017-05-01 DIAGNOSIS — M54.16 RIGHT LUMBAR RADICULITIS: ICD-10-CM

## 2017-05-01 DIAGNOSIS — R29.898 DECREASED STRENGTH OF TRUNK AND BACK: ICD-10-CM

## 2017-05-01 DIAGNOSIS — M54.50 CHRONIC BILATERAL LOW BACK PAIN WITHOUT SCIATICA: ICD-10-CM

## 2017-05-01 DIAGNOSIS — G89.29 CHRONIC BILATERAL LOW BACK PAIN WITHOUT SCIATICA: ICD-10-CM

## 2017-05-01 PROCEDURE — 97161 PT EVAL LOW COMPLEX 20 MIN: CPT | Mod: PN

## 2017-05-01 PROCEDURE — 97110 THERAPEUTIC EXERCISES: CPT | Mod: PN
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
## 2017-05-08 ENCOUNTER — CLINICAL SUPPORT (OUTPATIENT)
Dept: REHABILITATION | Facility: HOSPITAL | Age: 57
End: 2017-05-08
Attending: PHYSICAL MEDICINE & REHABILITATION
Payer: COMMERCIAL

## 2017-05-08 DIAGNOSIS — R29.898 DECREASED STRENGTH OF TRUNK AND BACK: ICD-10-CM

## 2017-05-08 DIAGNOSIS — G89.29 CHRONIC BILATERAL LOW BACK PAIN WITHOUT SCIATICA: ICD-10-CM

## 2017-05-08 DIAGNOSIS — M54.50 CHRONIC BILATERAL LOW BACK PAIN WITHOUT SCIATICA: ICD-10-CM

## 2017-05-08 DIAGNOSIS — M54.16 RIGHT LUMBAR RADICULITIS: ICD-10-CM

## 2017-05-08 PROCEDURE — 97110 THERAPEUTIC EXERCISES: CPT | Mod: PN
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
## 2017-05-15 ENCOUNTER — CLINICAL SUPPORT (OUTPATIENT)
Dept: REHABILITATION | Facility: HOSPITAL | Age: 57
End: 2017-05-15
Attending: PHYSICAL MEDICINE & REHABILITATION
Payer: COMMERCIAL

## 2017-05-15 DIAGNOSIS — M54.16 RIGHT LUMBAR RADICULITIS: ICD-10-CM

## 2017-05-15 DIAGNOSIS — M54.50 CHRONIC BILATERAL LOW BACK PAIN WITHOUT SCIATICA: ICD-10-CM

## 2017-05-15 DIAGNOSIS — G89.29 CHRONIC BILATERAL LOW BACK PAIN WITHOUT SCIATICA: ICD-10-CM

## 2017-05-15 DIAGNOSIS — R29.898 DECREASED STRENGTH OF TRUNK AND BACK: ICD-10-CM

## 2017-05-15 PROCEDURE — 97110 THERAPEUTIC EXERCISES: CPT | Mod: PN
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
## 2017-05-22 ENCOUNTER — CLINICAL SUPPORT (OUTPATIENT)
Dept: REHABILITATION | Facility: HOSPITAL | Age: 57
End: 2017-05-22
Attending: PHYSICAL MEDICINE & REHABILITATION
Payer: COMMERCIAL

## 2017-05-22 DIAGNOSIS — M54.16 RIGHT LUMBAR RADICULITIS: ICD-10-CM

## 2017-05-22 DIAGNOSIS — M54.50 CHRONIC BILATERAL LOW BACK PAIN WITHOUT SCIATICA: ICD-10-CM

## 2017-05-22 DIAGNOSIS — G89.29 CHRONIC BILATERAL LOW BACK PAIN WITHOUT SCIATICA: ICD-10-CM

## 2017-05-22 DIAGNOSIS — R29.898 DECREASED STRENGTH OF TRUNK AND BACK: ICD-10-CM

## 2017-05-22 PROCEDURE — 97110 THERAPEUTIC EXERCISES: CPT | Mod: PN
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
## 2017-05-29 ENCOUNTER — CLINICAL SUPPORT (OUTPATIENT)
Dept: REHABILITATION | Facility: HOSPITAL | Age: 57
End: 2017-05-29
Attending: PHYSICAL MEDICINE & REHABILITATION
Payer: COMMERCIAL

## 2017-05-29 DIAGNOSIS — M54.50 CHRONIC BILATERAL LOW BACK PAIN WITHOUT SCIATICA: ICD-10-CM

## 2017-05-29 DIAGNOSIS — R29.898 DECREASED STRENGTH OF TRUNK AND BACK: ICD-10-CM

## 2017-05-29 DIAGNOSIS — G89.29 CHRONIC BILATERAL LOW BACK PAIN WITHOUT SCIATICA: ICD-10-CM

## 2017-05-29 DIAGNOSIS — M54.16 RIGHT LUMBAR RADICULITIS: ICD-10-CM

## 2017-05-29 PROCEDURE — 97110 THERAPEUTIC EXERCISES: CPT | Mod: PN
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
## 2017-06-05 ENCOUNTER — CLINICAL SUPPORT (OUTPATIENT)
Dept: REHABILITATION | Facility: HOSPITAL | Age: 57
End: 2017-06-05
Attending: PHYSICAL MEDICINE & REHABILITATION
Payer: COMMERCIAL

## 2017-06-05 DIAGNOSIS — G89.29 CHRONIC BILATERAL LOW BACK PAIN WITHOUT SCIATICA: Primary | ICD-10-CM

## 2017-06-05 DIAGNOSIS — R29.898 DECREASED STRENGTH OF TRUNK AND BACK: ICD-10-CM

## 2017-06-05 DIAGNOSIS — M54.50 CHRONIC BILATERAL LOW BACK PAIN WITHOUT SCIATICA: Primary | ICD-10-CM

## 2017-06-05 DIAGNOSIS — M54.16 RIGHT LUMBAR RADICULITIS: ICD-10-CM

## 2017-06-05 PROCEDURE — 97110 THERAPEUTIC EXERCISES: CPT | Mod: PN
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
## 2017-06-12 ENCOUNTER — CLINICAL SUPPORT (OUTPATIENT)
Dept: REHABILITATION | Facility: HOSPITAL | Age: 57
End: 2017-06-12
Attending: PHYSICAL MEDICINE & REHABILITATION
Payer: COMMERCIAL

## 2017-06-12 DIAGNOSIS — G89.29 CHRONIC BILATERAL LOW BACK PAIN WITHOUT SCIATICA: ICD-10-CM

## 2017-06-12 DIAGNOSIS — R29.898 DECREASED STRENGTH OF TRUNK AND BACK: ICD-10-CM

## 2017-06-12 DIAGNOSIS — M54.16 RIGHT LUMBAR RADICULITIS: ICD-10-CM

## 2017-06-12 DIAGNOSIS — M54.50 CHRONIC BILATERAL LOW BACK PAIN WITHOUT SCIATICA: ICD-10-CM

## 2017-06-12 PROCEDURE — 97140 MANUAL THERAPY 1/> REGIONS: CPT | Mod: PN

## 2017-06-12 PROCEDURE — 97110 THERAPEUTIC EXERCISES: CPT | Mod: PN
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
## 2017-06-19 ENCOUNTER — CLINICAL SUPPORT (OUTPATIENT)
Dept: REHABILITATION | Facility: HOSPITAL | Age: 57
End: 2017-06-19
Attending: PHYSICAL MEDICINE & REHABILITATION
Payer: COMMERCIAL

## 2017-06-19 DIAGNOSIS — G89.29 CHRONIC BILATERAL LOW BACK PAIN WITHOUT SCIATICA: ICD-10-CM

## 2017-06-19 DIAGNOSIS — M54.16 RIGHT LUMBAR RADICULITIS: ICD-10-CM

## 2017-06-19 DIAGNOSIS — R29.898 DECREASED STRENGTH OF TRUNK AND BACK: ICD-10-CM

## 2017-06-19 DIAGNOSIS — M54.50 CHRONIC BILATERAL LOW BACK PAIN WITHOUT SCIATICA: ICD-10-CM

## 2017-06-19 PROCEDURE — 97110 THERAPEUTIC EXERCISES: CPT | Mod: PN

## 2017-06-19 PROCEDURE — 97140 MANUAL THERAPY 1/> REGIONS: CPT | Mod: PN
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
## 2017-06-26 ENCOUNTER — CLINICAL SUPPORT (OUTPATIENT)
Dept: REHABILITATION | Facility: HOSPITAL | Age: 57
End: 2017-06-26
Attending: PHYSICAL MEDICINE & REHABILITATION
Payer: COMMERCIAL

## 2017-06-26 DIAGNOSIS — M54.50 CHRONIC BILATERAL LOW BACK PAIN WITHOUT SCIATICA: ICD-10-CM

## 2017-06-26 DIAGNOSIS — M54.16 RIGHT LUMBAR RADICULITIS: ICD-10-CM

## 2017-06-26 DIAGNOSIS — R29.898 DECREASED STRENGTH OF TRUNK AND BACK: ICD-10-CM

## 2017-06-26 DIAGNOSIS — G89.29 CHRONIC BILATERAL LOW BACK PAIN WITHOUT SCIATICA: ICD-10-CM

## 2017-06-26 PROCEDURE — 97110 THERAPEUTIC EXERCISES: CPT | Mod: PN
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
## 2017-07-13 ENCOUNTER — OFFICE VISIT (OUTPATIENT)
Dept: SPINE | Facility: CLINIC | Age: 57
End: 2017-07-13
Attending: PHYSICAL MEDICINE & REHABILITATION
Payer: COMMERCIAL

## 2017-07-13 VITALS
DIASTOLIC BLOOD PRESSURE: 65 MMHG | WEIGHT: 218 LBS | SYSTOLIC BLOOD PRESSURE: 129 MMHG | HEART RATE: 59 BPM | HEIGHT: 65 IN | BODY MASS INDEX: 36.32 KG/M2

## 2017-07-13 DIAGNOSIS — G89.29 CHRONIC RIGHT-SIDED LOW BACK PAIN WITH RIGHT-SIDED SCIATICA: Primary | ICD-10-CM

## 2017-07-13 DIAGNOSIS — M54.41 CHRONIC RIGHT-SIDED LOW BACK PAIN WITH RIGHT-SIDED SCIATICA: Primary | ICD-10-CM

## 2017-07-13 DIAGNOSIS — M47.816 SPONDYLOSIS OF LUMBAR REGION WITHOUT MYELOPATHY OR RADICULOPATHY: ICD-10-CM

## 2017-07-13 PROCEDURE — 99214 OFFICE O/P EST MOD 30 MIN: CPT | Mod: S$GLB,,, | Performed by: PHYSICAL MEDICINE & REHABILITATION

## 2017-07-13 PROCEDURE — 99999 PR PBB SHADOW E&M-EST. PATIENT-LVL IV: CPT | Mod: PBBFAC,,, | Performed by: PHYSICAL MEDICINE & REHABILITATION
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
## 2017-07-25 ENCOUNTER — CLINICAL SUPPORT (OUTPATIENT)
Dept: REHABILITATION | Facility: HOSPITAL | Age: 57
End: 2017-07-25
Attending: PHYSICAL MEDICINE & REHABILITATION
Payer: COMMERCIAL

## 2017-07-25 DIAGNOSIS — R29.898 DECREASED STRENGTH OF TRUNK AND BACK: ICD-10-CM

## 2017-07-25 DIAGNOSIS — M54.16 RIGHT LUMBAR RADICULITIS: ICD-10-CM

## 2017-07-25 DIAGNOSIS — M54.50 CHRONIC BILATERAL LOW BACK PAIN WITHOUT SCIATICA: ICD-10-CM

## 2017-07-25 DIAGNOSIS — G89.29 CHRONIC BILATERAL LOW BACK PAIN WITHOUT SCIATICA: ICD-10-CM

## 2017-07-25 PROCEDURE — 97110 THERAPEUTIC EXERCISES: CPT | Mod: PN
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
## 2017-08-02 ENCOUNTER — CLINICAL SUPPORT (OUTPATIENT)
Dept: REHABILITATION | Facility: HOSPITAL | Age: 57
End: 2017-08-02
Attending: PHYSICAL MEDICINE & REHABILITATION
Payer: COMMERCIAL

## 2017-08-02 DIAGNOSIS — G89.29 CHRONIC BILATERAL LOW BACK PAIN WITHOUT SCIATICA: ICD-10-CM

## 2017-08-02 DIAGNOSIS — R29.898 DECREASED STRENGTH OF TRUNK AND BACK: ICD-10-CM

## 2017-08-02 DIAGNOSIS — M54.16 RIGHT LUMBAR RADICULITIS: ICD-10-CM

## 2017-08-02 DIAGNOSIS — M54.50 CHRONIC BILATERAL LOW BACK PAIN WITHOUT SCIATICA: ICD-10-CM

## 2017-08-02 PROCEDURE — 97110 THERAPEUTIC EXERCISES: CPT | Mod: PN
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
## 2017-08-08 ENCOUNTER — CLINICAL SUPPORT (OUTPATIENT)
Dept: REHABILITATION | Facility: HOSPITAL | Age: 57
End: 2017-08-08
Attending: PHYSICAL MEDICINE & REHABILITATION
Payer: COMMERCIAL

## 2017-08-08 DIAGNOSIS — M54.16 RIGHT LUMBAR RADICULITIS: ICD-10-CM

## 2017-08-08 DIAGNOSIS — R29.898 DECREASED STRENGTH OF TRUNK AND BACK: ICD-10-CM

## 2017-08-08 DIAGNOSIS — M54.50 CHRONIC BILATERAL LOW BACK PAIN WITHOUT SCIATICA: ICD-10-CM

## 2017-08-08 DIAGNOSIS — G89.29 CHRONIC BILATERAL LOW BACK PAIN WITHOUT SCIATICA: ICD-10-CM

## 2017-08-08 PROCEDURE — 97110 THERAPEUTIC EXERCISES: CPT | Mod: PN
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
## 2017-08-22 ENCOUNTER — CLINICAL SUPPORT (OUTPATIENT)
Dept: REHABILITATION | Facility: HOSPITAL | Age: 57
End: 2017-08-22
Attending: PHYSICAL MEDICINE & REHABILITATION
Payer: COMMERCIAL

## 2017-08-22 DIAGNOSIS — M54.50 CHRONIC BILATERAL LOW BACK PAIN WITHOUT SCIATICA: ICD-10-CM

## 2017-08-22 DIAGNOSIS — R29.898 DECREASED STRENGTH OF TRUNK AND BACK: ICD-10-CM

## 2017-08-22 DIAGNOSIS — G89.29 CHRONIC BILATERAL LOW BACK PAIN WITHOUT SCIATICA: ICD-10-CM

## 2017-08-22 DIAGNOSIS — M54.16 RIGHT LUMBAR RADICULITIS: ICD-10-CM

## 2017-08-22 PROCEDURE — 97110 THERAPEUTIC EXERCISES: CPT | Mod: PN
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
## 2017-08-29 ENCOUNTER — CLINICAL SUPPORT (OUTPATIENT)
Dept: REHABILITATION | Facility: HOSPITAL | Age: 57
End: 2017-08-29
Attending: PHYSICAL MEDICINE & REHABILITATION
Payer: COMMERCIAL

## 2017-08-29 DIAGNOSIS — G89.29 CHRONIC BILATERAL LOW BACK PAIN WITHOUT SCIATICA: Primary | ICD-10-CM

## 2017-08-29 DIAGNOSIS — M54.50 CHRONIC BILATERAL LOW BACK PAIN WITHOUT SCIATICA: Primary | ICD-10-CM

## 2017-08-29 DIAGNOSIS — M54.16 RIGHT LUMBAR RADICULITIS: ICD-10-CM

## 2017-08-29 DIAGNOSIS — R29.898 DECREASED STRENGTH OF TRUNK AND BACK: ICD-10-CM

## 2017-08-29 PROCEDURE — 97110 THERAPEUTIC EXERCISES: CPT | Mod: PN
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
## 2020-05-26 ENCOUNTER — HOSPITAL ENCOUNTER (EMERGENCY)
Facility: HOSPITAL | Age: 60
Discharge: HOME | End: 2020-05-26
Attending: EMERGENCY MEDICINE
Payer: COMMERCIAL

## 2020-05-26 VITALS
WEIGHT: 220 LBS | HEIGHT: 65 IN | TEMPERATURE: 99 F | OXYGEN SATURATION: 97 % | RESPIRATION RATE: 18 BRPM | BODY MASS INDEX: 36.65 KG/M2 | HEART RATE: 82 BPM | SYSTOLIC BLOOD PRESSURE: 158 MMHG | DIASTOLIC BLOOD PRESSURE: 76 MMHG

## 2020-05-26 DIAGNOSIS — M79.606 LEG PAIN: ICD-10-CM

## 2020-05-26 DIAGNOSIS — M79.89 RIGHT LEG SWELLING: ICD-10-CM

## 2020-05-26 DIAGNOSIS — M25.461 SWELLING OF JOINT OF RIGHT KNEE: ICD-10-CM

## 2020-05-26 DIAGNOSIS — I87.2 VENOUS INSUFFICIENCY: Primary | ICD-10-CM

## 2020-05-26 LAB
ALBUMIN SERPL BCP-MCNC: 3.7 G/DL (ref 3.5–5.2)
ALP SERPL-CCNC: 76 U/L (ref 55–135)
ALT SERPL W/O P-5'-P-CCNC: 16 U/L (ref 10–44)
ANION GAP SERPL CALC-SCNC: 7 MMOL/L (ref 8–16)
AST SERPL-CCNC: 11 U/L (ref 10–40)
BASOPHILS # BLD AUTO: 0.02 K/UL (ref 0–0.2)
BASOPHILS NFR BLD: 0.2 % (ref 0–1.9)
BILIRUB SERPL-MCNC: 0.4 MG/DL (ref 0.1–1)
BUN SERPL-MCNC: 16 MG/DL (ref 6–20)
CALCIUM SERPL-MCNC: 9.1 MG/DL (ref 8.7–10.5)
CHLORIDE SERPL-SCNC: 104 MMOL/L (ref 95–110)
CO2 SERPL-SCNC: 28 MMOL/L (ref 23–29)
CREAT SERPL-MCNC: 1 MG/DL (ref 0.5–1.4)
DIFFERENTIAL METHOD: NORMAL
EOSINOPHIL # BLD AUTO: 0.2 K/UL (ref 0–0.5)
EOSINOPHIL NFR BLD: 2.5 % (ref 0–8)
ERYTHROCYTE [DISTWIDTH] IN BLOOD BY AUTOMATED COUNT: 12.8 % (ref 11.5–14.5)
EST. GFR  (AFRICAN AMERICAN): >60 ML/MIN/1.73 M^2
EST. GFR  (NON AFRICAN AMERICAN): >60 ML/MIN/1.73 M^2
GLUCOSE SERPL-MCNC: 134 MG/DL (ref 70–110)
HCT VFR BLD AUTO: 42 % (ref 40–54)
HGB BLD-MCNC: 14 G/DL (ref 14–18)
IMM GRANULOCYTES # BLD AUTO: 0.04 K/UL (ref 0–0.04)
IMM GRANULOCYTES NFR BLD AUTO: 0.5 % (ref 0–0.5)
LYMPHOCYTES # BLD AUTO: 2.7 K/UL (ref 1–4.8)
LYMPHOCYTES NFR BLD: 31.2 % (ref 18–48)
MCH RBC QN AUTO: 30.4 PG (ref 27–31)
MCHC RBC AUTO-ENTMCNC: 33.3 G/DL (ref 32–36)
MCV RBC AUTO: 91 FL (ref 82–98)
MONOCYTES # BLD AUTO: 0.8 K/UL (ref 0.3–1)
MONOCYTES NFR BLD: 8.8 % (ref 4–15)
NEUTROPHILS # BLD AUTO: 4.9 K/UL (ref 1.8–7.7)
NEUTROPHILS NFR BLD: 56.8 % (ref 38–73)
NRBC BLD-RTO: 0 /100 WBC
PLATELET # BLD AUTO: 185 K/UL (ref 150–350)
PMV BLD AUTO: 10.9 FL (ref 9.2–12.9)
POTASSIUM SERPL-SCNC: 4.1 MMOL/L (ref 3.5–5.1)
PROT SERPL-MCNC: 7 G/DL (ref 6–8.4)
RBC # BLD AUTO: 4.61 M/UL (ref 4.6–6.2)
SODIUM SERPL-SCNC: 139 MMOL/L (ref 136–145)
WBC # BLD AUTO: 8.57 K/UL (ref 3.9–12.7)

## 2020-05-26 PROCEDURE — 85025 COMPLETE CBC W/AUTO DIFF WBC: CPT

## 2020-05-26 PROCEDURE — 99285 EMERGENCY DEPT VISIT HI MDM: CPT | Mod: 25

## 2020-05-26 PROCEDURE — 25000003 PHARM REV CODE 250: Performed by: EMERGENCY MEDICINE

## 2020-05-26 PROCEDURE — 80053 COMPREHEN METABOLIC PANEL: CPT

## 2020-05-26 RX ORDER — FUROSEMIDE 40 MG/1
40 TABLET ORAL
Status: COMPLETED | OUTPATIENT
Start: 2020-05-26 | End: 2020-05-26

## 2020-05-26 RX ORDER — NAPROXEN 500 MG/1
500 TABLET ORAL
Status: COMPLETED | OUTPATIENT
Start: 2020-05-26 | End: 2020-05-26

## 2020-05-26 RX ORDER — DICLOFENAC SODIUM 50 MG/1
50 TABLET, DELAYED RELEASE ORAL 3 TIMES DAILY
Qty: 15 TABLET | Refills: 0 | Status: SHIPPED | OUTPATIENT
Start: 2020-05-26 | End: 2021-05-26

## 2020-05-26 RX ADMIN — FUROSEMIDE 40 MG: 40 TABLET ORAL at 01:05

## 2020-05-26 RX ADMIN — NAPROXEN 500 MG: 500 TABLET ORAL at 12:05

## 2020-05-26 NOTE — ED PROVIDER NOTES
Encounter Date: 5/26/2020       History     Chief Complaint   Patient presents with    Leg Pain     right lower leg pain that began on sun, pt states starts at knee and has now traveled down right lower leg, denies reddness, denies trauma, + HIV     Chief complaint:  Leg pain    HPI:  60-year-old male presents with a 2 day history of right lower leg pain with associated swelling.  He denies any known injury.  He has no history of DVT or pulmonary emboli.  He denies any history of gout and has no swelling of his joints.  Past medical history is significant for HIV, hypertension and prostate disease.        Review of patient's allergies indicates:  No Known Allergies  Past Medical History:   Diagnosis Date    High cholesterol     HIV disease     Hypertension     Prostate disorder      No past surgical history on file.  No family history on file.  Social History     Tobacco Use    Smoking status: Never Smoker   Substance Use Topics    Alcohol use: No    Drug use: No     Comment: used in the past     Review of Systems   Constitutional: Negative for activity change, appetite change, chills, fatigue and fever.   Eyes: Negative for visual disturbance.   Respiratory: Negative for apnea and shortness of breath.    Cardiovascular: Negative for chest pain and palpitations.   Gastrointestinal: Negative for abdominal distention and abdominal pain.   Genitourinary: Negative for difficulty urinating.   Musculoskeletal: Positive for myalgias. Negative for neck pain.   Skin: Negative for pallor and rash.   Neurological: Negative for headaches.   Hematological: Does not bruise/bleed easily.   Psychiatric/Behavioral: Negative for agitation.       Physical Exam     Initial Vitals [05/26/20 1157]   BP Pulse Resp Temp SpO2   (!) 162/77 65 18 98.2 °F (36.8 °C) 97 %      MAP       --         Physical Exam    Nursing note and vitals reviewed.  Constitutional: He appears well-developed and well-nourished.   HENT:   Head:  Normocephalic and atraumatic.   Eyes: Conjunctivae are normal.   Neck: Normal range of motion. Neck supple.   Cardiovascular: Normal rate, regular rhythm and normal heart sounds. Exam reveals no gallop and no friction rub.    No murmur heard.  Pulmonary/Chest: Breath sounds normal. No respiratory distress. He has no wheezes. He has no rhonchi. He has no rales.   Abdominal: Soft. He exhibits no distension. There is no tenderness.   Musculoskeletal: Normal range of motion. He exhibits tenderness (Right anterior tibial tenderness with 2+ pitting edema).   No joint effusion or swelling   Neurological: He is alert and oriented to person, place, and time.   Skin: Skin is warm and dry.   Psychiatric: He has a normal mood and affect.         ED Course   Procedures  Labs Reviewed   COMPREHENSIVE METABOLIC PANEL - Abnormal; Notable for the following components:       Result Value    Glucose 134 (*)     Anion Gap 7 (*)     All other components within normal limits   CBC W/ AUTO DIFFERENTIAL          Imaging Results          US Lower Extremity Veins Right (Final result)  Result time 05/26/20 13:24:50    Final result by Camacho Franklin MD (05/26/20 13:24:50)                 Impression:      No evidence of deep venous thrombosis in the right lower extremity.    In the patient's region of pain, there is heterogeneous scattered subcutaneous edema without significant organization.  Finding is nonspecific differential would include nonspecific fluid or possibly hematoma.  Correlation is advised.      Electronically signed by: Camacho Franklin MD  Date:    05/26/2020  Time:    13:24             Narrative:    EXAMINATION:  US LOWER EXTREMITY VEINS RIGHT    CLINICAL HISTORY:  Other specified soft tissue disorders    TECHNIQUE:  Duplex and color flow Doppler evaluation and graded compression of the right lower extremity veins was performed.    COMPARISON:  None    FINDINGS:  Duplex and color flow Doppler evaluation does not reveal any  evidence of acute venous thrombosis in the common femoral, superficial femoral, greater saphenous, popliteal, peroneal, anterior tibial and posterior tibial veins of the right lower extremity.  There is no reflux to suggest valvular incompetence.    There is a heterogeneous disorganized collection along the right medial anterior aspect of the knee, in the patient's region of pain.  No internal vascularity.                               X-Ray Knee 3 View Right (Final result)  Result time 05/26/20 12:59:22    Final result by Georgi Garcia MD (05/26/20 12:59:22)                 Impression:      Pretibial soft tissue swelling.  Otherwise, no acute abnormalities.      Electronically signed by: Georgi Garcia MD  Date:    05/26/2020  Time:    12:59             Narrative:    EXAMINATION:  XR KNEE 3 VIEW RIGHT    CLINICAL HISTORY:  Effusion, right knee    TECHNIQUE:  AP, lateral, and Merchant views of the right knee were performed.    COMPARISON:  None    FINDINGS:  No evidence of acute fracture, dislocation or bone destruction.  No evidence of large suprapatellar joint effusion.  There is pretibial soft tissue swelling.  No radiopaque retained foreign body.                                 Medical Decision Making:   ED Management:  60-year-old male presents with nontraumatic right leg pain with associated swelling.  Knee x-rays independently interpreted by me demonstrate no radiographic abnormalities.  Ultrasound fails to demonstrate any evidence of DVT.  Distal DVT cannot be entirely excluded; however, it becomes less likely.  The symptoms most likely reflect venous insufficiency.  There is no evidence of right-sided heart failure.  He has no overlying erythema or calor with infectious etiology unlikely.                                 Clinical Impression:       ICD-10-CM ICD-9-CM   1. Venous insufficiency I87.2 459.81   2. Swelling of joint of right knee M25.461 719.06   3. Right leg swelling M79.89 729.81   4. Leg pain  M79.606 729.5                                Filiberto Montesinos III, MD  05/26/20 1424

## 2020-05-26 NOTE — ED TRIAGE NOTES
Pt presents to ED with C/O 6/10 R lag pain and swelling with onset x3 days ago. Pt states the pain radiates from R knee to R foot. Pedal pulses present pt is able to flex and extend at foot. pt denies and injury or trauma to the area. No redness noted.

## 2020-05-26 NOTE — PROVIDER PROGRESS NOTES - EMERGENCY DEPT.
Emergency Department TeleTRIAGE Encounter Note      CHIEF COMPLAINT    Chief Complaint   Patient presents with    Leg Pain     right lower leg pain that began on sun, pt states starts at knee and has now traveled down right lower leg, denies reddness, denies trauma, + HIV       VITAL SIGNS   Initial Vitals [05/26/20 1157]   BP Pulse Resp Temp SpO2   (!) 162/77 65 18 98.2 °F (36.8 °C) 97 %      MAP       --            ALLERGIES    Review of patient's allergies indicates:  No Known Allergies    PROVIDER TRIAGE NOTE  The patient is complaining of a one-week history of right lower extremity pain and swelling.  In began 1 week ago.  He denies trauma.  He states that it extends from his knee down his leg.      ORDERS  Labs Reviewed - No data to display    ED Orders (720h ago, onward)    Start Ordered     Status Ordering Provider    05/26/20 1204 05/26/20 1203  X-Ray Knee 3 View Right  1 time imaging      Ordered SUSU SOLOMON    05/26/20 1204 05/26/20 1203  US Lower Extremity Veins Right  1 time imaging      Ordered SUSU SOLOMON            Virtual Visit Note: The provider triage portion of this emergency department evaluation and documentation was performed via [employer], a HIPAA-compliant telemedicine application, in concert with a tele-presenter in the room. A face to face patient evaluation with one of my colleagues will occur once the patient is placed in an emergency department room.      DISCLAIMER: This note was prepared with [employer] voice recognition transcription software. Garbled syntax, mangled pronouns, and other bizarre constructions may be attributed to that software system.

## 2021-09-15 ENCOUNTER — OFFICE VISIT (OUTPATIENT)
Dept: SLEEP MEDICINE | Facility: CLINIC | Age: 61
End: 2021-09-15
Payer: COMMERCIAL

## 2021-09-15 DIAGNOSIS — G47.30 SLEEP APNEA, UNSPECIFIED TYPE: Primary | ICD-10-CM

## 2021-09-15 PROCEDURE — 99999 PR PBB SHADOW E&M-EST. PATIENT-LVL II: ICD-10-PCS | Mod: PBBFAC,,, | Performed by: PSYCHIATRY & NEUROLOGY

## 2021-09-15 PROCEDURE — 4010F PR ACE/ARB THEARPY RXD/TAKEN: ICD-10-PCS | Mod: CPTII,S$GLB,, | Performed by: PSYCHIATRY & NEUROLOGY

## 2021-09-15 PROCEDURE — 1160F PR REVIEW ALL MEDS BY PRESCRIBER/CLIN PHARMACIST DOCUMENTED: ICD-10-PCS | Mod: CPTII,S$GLB,, | Performed by: PSYCHIATRY & NEUROLOGY

## 2021-09-15 PROCEDURE — 1159F MED LIST DOCD IN RCRD: CPT | Mod: CPTII,S$GLB,, | Performed by: PSYCHIATRY & NEUROLOGY

## 2021-09-15 PROCEDURE — 99999 PR PBB SHADOW E&M-EST. PATIENT-LVL II: CPT | Mod: PBBFAC,,, | Performed by: PSYCHIATRY & NEUROLOGY

## 2021-09-15 PROCEDURE — 1160F RVW MEDS BY RX/DR IN RCRD: CPT | Mod: CPTII,S$GLB,, | Performed by: PSYCHIATRY & NEUROLOGY

## 2021-09-15 PROCEDURE — 1159F PR MEDICATION LIST DOCUMENTED IN MEDICAL RECORD: ICD-10-PCS | Mod: CPTII,S$GLB,, | Performed by: PSYCHIATRY & NEUROLOGY

## 2021-09-15 PROCEDURE — 4010F ACE/ARB THERAPY RXD/TAKEN: CPT | Mod: CPTII,S$GLB,, | Performed by: PSYCHIATRY & NEUROLOGY

## 2021-09-15 PROCEDURE — 99204 PR OFFICE/OUTPT VISIT, NEW, LEVL IV, 45-59 MIN: ICD-10-PCS | Mod: S$GLB,,, | Performed by: PSYCHIATRY & NEUROLOGY

## 2021-09-15 PROCEDURE — 99204 OFFICE O/P NEW MOD 45 MIN: CPT | Mod: S$GLB,,, | Performed by: PSYCHIATRY & NEUROLOGY

## 2021-09-17 ENCOUNTER — TELEPHONE (OUTPATIENT)
Dept: SLEEP MEDICINE | Facility: OTHER | Age: 61
End: 2021-09-17

## 2021-09-28 ENCOUNTER — HOSPITAL ENCOUNTER (OUTPATIENT)
Dept: SLEEP MEDICINE | Facility: OTHER | Age: 61
Discharge: HOME | End: 2021-09-28
Attending: PSYCHIATRY & NEUROLOGY
Payer: COMMERCIAL

## 2021-09-28 DIAGNOSIS — G47.33 OSA (OBSTRUCTIVE SLEEP APNEA): ICD-10-CM

## 2021-09-28 DIAGNOSIS — G47.30 SLEEP APNEA, UNSPECIFIED TYPE: ICD-10-CM

## 2021-09-28 PROCEDURE — 95800 SLP STDY UNATTENDED: CPT | Mod: 26,,, | Performed by: PSYCHIATRY & NEUROLOGY

## 2021-09-28 PROCEDURE — 95800 SLP STDY UNATTENDED: CPT

## 2021-09-28 PROCEDURE — 95800 PR SLEEP STUDY, UNATTENDED, RECORD HEART RATE/O2 SAT/RESP ANAL/SLEEP TIME: ICD-10-PCS | Mod: 26,,, | Performed by: PSYCHIATRY & NEUROLOGY

## 2021-10-07 ENCOUNTER — TELEPHONE (OUTPATIENT)
Dept: SLEEP MEDICINE | Facility: CLINIC | Age: 61
End: 2021-10-07

## 2021-10-07 DIAGNOSIS — G47.33 OSA (OBSTRUCTIVE SLEEP APNEA): Primary | ICD-10-CM

## 2021-12-06 ENCOUNTER — TELEPHONE (OUTPATIENT)
Dept: SLEEP MEDICINE | Facility: CLINIC | Age: 61
End: 2021-12-06
Payer: COMMERCIAL

## 2022-01-05 ENCOUNTER — OFFICE VISIT (OUTPATIENT)
Dept: SLEEP MEDICINE | Facility: CLINIC | Age: 62
End: 2022-01-05
Payer: COMMERCIAL

## 2022-01-05 VITALS
BODY MASS INDEX: 37.3 KG/M2 | HEIGHT: 65 IN | HEART RATE: 51 BPM | DIASTOLIC BLOOD PRESSURE: 64 MMHG | WEIGHT: 223.88 LBS | SYSTOLIC BLOOD PRESSURE: 124 MMHG

## 2022-01-05 DIAGNOSIS — G47.33 OSA (OBSTRUCTIVE SLEEP APNEA): Primary | ICD-10-CM

## 2022-01-05 PROCEDURE — 3008F PR BODY MASS INDEX (BMI) DOCUMENTED: ICD-10-PCS | Mod: CPTII,S$GLB,, | Performed by: PSYCHIATRY & NEUROLOGY

## 2022-01-05 PROCEDURE — 99214 PR OFFICE/OUTPT VISIT, EST, LEVL IV, 30-39 MIN: ICD-10-PCS | Mod: S$GLB,,, | Performed by: PSYCHIATRY & NEUROLOGY

## 2022-01-05 PROCEDURE — 3078F DIAST BP <80 MM HG: CPT | Mod: CPTII,S$GLB,, | Performed by: PSYCHIATRY & NEUROLOGY

## 2022-01-05 PROCEDURE — 3078F PR MOST RECENT DIASTOLIC BLOOD PRESSURE < 80 MM HG: ICD-10-PCS | Mod: CPTII,S$GLB,, | Performed by: PSYCHIATRY & NEUROLOGY

## 2022-01-05 PROCEDURE — 99999 PR PBB SHADOW E&M-EST. PATIENT-LVL III: CPT | Mod: PBBFAC,,, | Performed by: PSYCHIATRY & NEUROLOGY

## 2022-01-05 PROCEDURE — 1159F PR MEDICATION LIST DOCUMENTED IN MEDICAL RECORD: ICD-10-PCS | Mod: CPTII,S$GLB,, | Performed by: PSYCHIATRY & NEUROLOGY

## 2022-01-05 PROCEDURE — 99214 OFFICE O/P EST MOD 30 MIN: CPT | Mod: S$GLB,,, | Performed by: PSYCHIATRY & NEUROLOGY

## 2022-01-05 PROCEDURE — 1159F MED LIST DOCD IN RCRD: CPT | Mod: CPTII,S$GLB,, | Performed by: PSYCHIATRY & NEUROLOGY

## 2022-01-05 PROCEDURE — 3074F PR MOST RECENT SYSTOLIC BLOOD PRESSURE < 130 MM HG: ICD-10-PCS | Mod: CPTII,S$GLB,, | Performed by: PSYCHIATRY & NEUROLOGY

## 2022-01-05 PROCEDURE — 99999 PR PBB SHADOW E&M-EST. PATIENT-LVL III: ICD-10-PCS | Mod: PBBFAC,,, | Performed by: PSYCHIATRY & NEUROLOGY

## 2022-01-05 PROCEDURE — 3074F SYST BP LT 130 MM HG: CPT | Mod: CPTII,S$GLB,, | Performed by: PSYCHIATRY & NEUROLOGY

## 2022-01-05 PROCEDURE — 3008F BODY MASS INDEX DOCD: CPT | Mod: CPTII,S$GLB,, | Performed by: PSYCHIATRY & NEUROLOGY

## 2022-01-05 NOTE — PROGRESS NOTES
" [employer] (ip[employer] wheels) was used for translation from [place]    EPWORTH SLEEPINESS SCALE TOTAL SCORE  1/5/2022   Total score 9       Apollo FloresBritneyJordan is a 61 y.o. male seen today for CPAP follow up. Last seen on 9/15/2021    The patient reports improved sleep continuity and daytime sleepiness on PAP. ESS today is 9/24.  Denies break through snoring.  No dry mouth.   O aerophagia or air hunger. REPORTS  significant mask leaks and discomfort.    Very significant mask leak likely affecting AHI. Still, AHI looks improved as compared to based  Hard with nasakl mask - he is a mouth breather  APAP 5 to 18; 90% was 15    mpliance Report  Compliance  Payor Standard  Usage 12/06/2021 - 01/04/2022  Usage days 30/30 days (100%)  >= 4 hours 30 days (100%)  < 4 hours 0 days (0%)  Usage hours 165 hours 46 minutes  Average usage (total days) 5 hours 32 minutes  Average usage (days used) 5 hours 32 minutes  Median usage (days used) 5 hours 32 minutes  Total used hours (value since last reset - 01/04/2022) 331 hours  AirSense 11 AutoSet  Serial number [id number]  Mode AutoSet  Min Pressure 5 cmH2O  Max Pressure 18 cmH2O  EPR Fulltime  EPR level 1  Response Standard  Therapy  Pressure - cmH2O Median: 10.3 95th percentile: 13.7 Maximum: 15.3  Leaks - L/min Median: 39.2 95th percentile: 62.9 Maximum: 74.6  Events per hour AI: 9.5 HI: 2.0 AHI: 11.5  Apnea Index Central: 0.0 Obstructive: 0.5 Unknown: 9.0  RERA Index 3.6  Cheyne-Yeboah respiration (average duration per night) 2 minutes       REVIEW OF SYSTEMS:   Sleep related symptoms as per HPI    reports weight gain  Reports occasional dyspnea on awakening  Reports occasional palpitations on awakening  Reports occasional acid reflux   Reports polyuria x2  Denies  mood diturbance  Denies  anemia  Reports occasional  muscle pain  Denies  Gait imbalance    Otherwise, a balance of 10 systems reviewed is negative.    PHYSICAL EXAM:  /64   Pulse (!) 51   Ht 5' 5" (1.651 " "m)   Wt 101.6 kg (223 lb 14.4 oz)   BMI 37.26 kg/m²           ASSESSMENT:    1. PATRIC The patient symptomatically has  excessive daytime sleepiness, snoring,  witnessed breathing pauses, excessive daytime fatigue, gasping for air in sleep, interrupted sleep and nocturia  with exam findings of "a crowded oral airway and elevated body mass index. The patient has medical co-morbidities of hyperlipidemia and impaired glucose tolerance,  which can be worsened by PATRIC. This warrants treatment.           PLAN:      Continue  for auto CPAP 5-18 cm with the mask of a patient's choice was given to the patient.  I showed him how to tighten his Wisp; if it does not help, wants to try FFM - will be eligible in a couple of weeks.     Education: During our discussion today, we talked about the etiology of obstructive sleep apnea as well as the potential ramifications of untreated sleep apnea, which could include daytime sleepiness, hypertension, heart disease and/or stroke.      We discussed potential treatment options, which could include weight loss, body positioning, continuous positive airway pressure (CPAP), or referral for surgical consideration. The patient preferred CPAP option.     Discussed purpose of PAP therapy, health benefits of CPAP, as well as the potential ramifications of untreated sleep apnea, which could include daytime sleepiness, hypertension, heart disease and/or stroke. An AHI of 15 is associated with increased risk CVD.     Regular replacement of CPAP mask, tubing and filter was recommended.    The patient was given open opportunity to ask questions and/or express concerns about treatment plan. Two point patient identifier confirmed.     Precautions: The patient was advised to abstain from driving should he feel sleepy or drowsy.    Follow up: me after 31 days  of PAP use.  31-minute visit. >50% spent counseling patient and coordination of care.                      "

## 2022-03-28 ENCOUNTER — TELEPHONE (OUTPATIENT)
Dept: SLEEP MEDICINE | Facility: CLINIC | Age: 62
End: 2022-03-28
Payer: COMMERCIAL

## 2022-03-28 NOTE — TELEPHONE ENCOUNTER
Staff reached out to pt about appt, new location, and sleep questions listed in my chart. Pt confirmed.

## 2022-03-29 ENCOUNTER — OFFICE VISIT (OUTPATIENT)
Dept: SLEEP MEDICINE | Facility: CLINIC | Age: 62
End: 2022-03-29
Payer: COMMERCIAL

## 2022-03-29 VITALS
WEIGHT: 222.81 LBS | HEIGHT: 65 IN | HEART RATE: 66 BPM | DIASTOLIC BLOOD PRESSURE: 74 MMHG | BODY MASS INDEX: 37.12 KG/M2 | SYSTOLIC BLOOD PRESSURE: 132 MMHG

## 2022-03-29 DIAGNOSIS — G47.33 OSA (OBSTRUCTIVE SLEEP APNEA): Primary | ICD-10-CM

## 2022-03-29 PROCEDURE — 4010F PR ACE/ARB THEARPY RXD/TAKEN: ICD-10-PCS | Mod: CPTII,S$GLB,, | Performed by: PSYCHIATRY & NEUROLOGY

## 2022-03-29 PROCEDURE — 99214 PR OFFICE/OUTPT VISIT, EST, LEVL IV, 30-39 MIN: ICD-10-PCS | Mod: S$GLB,,, | Performed by: PSYCHIATRY & NEUROLOGY

## 2022-03-29 PROCEDURE — 3075F SYST BP GE 130 - 139MM HG: CPT | Mod: CPTII,S$GLB,, | Performed by: PSYCHIATRY & NEUROLOGY

## 2022-03-29 PROCEDURE — 3078F DIAST BP <80 MM HG: CPT | Mod: CPTII,S$GLB,, | Performed by: PSYCHIATRY & NEUROLOGY

## 2022-03-29 PROCEDURE — 3008F BODY MASS INDEX DOCD: CPT | Mod: CPTII,S$GLB,, | Performed by: PSYCHIATRY & NEUROLOGY

## 2022-03-29 PROCEDURE — 1159F PR MEDICATION LIST DOCUMENTED IN MEDICAL RECORD: ICD-10-PCS | Mod: CPTII,S$GLB,, | Performed by: PSYCHIATRY & NEUROLOGY

## 2022-03-29 PROCEDURE — 3075F PR MOST RECENT SYSTOLIC BLOOD PRESS GE 130-139MM HG: ICD-10-PCS | Mod: CPTII,S$GLB,, | Performed by: PSYCHIATRY & NEUROLOGY

## 2022-03-29 PROCEDURE — 3008F PR BODY MASS INDEX (BMI) DOCUMENTED: ICD-10-PCS | Mod: CPTII,S$GLB,, | Performed by: PSYCHIATRY & NEUROLOGY

## 2022-03-29 PROCEDURE — 1159F MED LIST DOCD IN RCRD: CPT | Mod: CPTII,S$GLB,, | Performed by: PSYCHIATRY & NEUROLOGY

## 2022-03-29 PROCEDURE — 3078F PR MOST RECENT DIASTOLIC BLOOD PRESSURE < 80 MM HG: ICD-10-PCS | Mod: CPTII,S$GLB,, | Performed by: PSYCHIATRY & NEUROLOGY

## 2022-03-29 PROCEDURE — 4010F ACE/ARB THERAPY RXD/TAKEN: CPT | Mod: CPTII,S$GLB,, | Performed by: PSYCHIATRY & NEUROLOGY

## 2022-03-29 PROCEDURE — 99214 OFFICE O/P EST MOD 30 MIN: CPT | Mod: S$GLB,,, | Performed by: PSYCHIATRY & NEUROLOGY

## 2022-03-29 NOTE — PROGRESS NOTES
"    EPWORTH SLEEPINESS SCALE TOTAL SCORE  3/29/2022 1/5/2022   Total score 10 9       Apollo FloresBritneyJordan is a 61 y.o. male seen today for CPAP follow up. Last seen on 1/5/2022    The patient reports improved sleep continuity and daytime sleepiness on PAP. ESS today is 10/24.  Denies break through snoring.  ++ dry mouth.    aerophagia or air hunger.  NO significant mask leaks and discomfort.\    Since switching to FFM, he has been noticing the pressure waking him up in hte middle of the night lately - stopped using CPAP a week ago.  F20 Airfit - likes it more than Wisp that he used before.   Detailed report from one day showed good tolerance of pressure 12 cm H2O; the awakening and mask removal took place once the pressure went up to 15-16 cm H2O      APAP 5-18; 90% - 15 cm H2O  Compliance Report  Compliance  Payor Standard  Usage 02/26/2022 - 03/27/2022  Usage days 6/30 days (20%)  >= 4 hours 4 days (13%)  < 4 hours 2 days (7%)  Usage hours 23 hours 57 minutes  Average usage (total days) 48 minutes  Average usage (days used) 4 hours 0 minutes  Median usage (days used) 5 hours 1 minutes  Total used hours (value since last reset - 03/27/2022) 533 hours  AirSense 11 AutoSet  Serial number [id number]  Mode AutoSet  Min Pressure 5 cmH2O  Max Pressure 18 cmH2O  EPR Fulltime  EPR level 1  Response Standard  Therapy  Pressure - cmH2O Median: 11.0 95th percentile: 13.5 Maximum: 14.8  Leaks - L/min Median: 14.8 95th percentile: 54.0 Maximum: 74.6  Events per hour AI: 5.5 HI: 3.2 AHI: 8.7  Apnea Index Central: 0.0 Obstructive: 1.3 Unknown: 4.2  RERA Index 1.8  Cheyne-Yeboah respiration (average duration per night) 0 minutes (0%)      PHYSICAL EXAM:  /74   Pulse 66   Ht 5' 5" (1.651 m)   Wt 101.1 kg (222 lb 12.8 oz)   BMI 37.08 kg/m²       Sleep Study: 10/21: Very Severe sleep disordered breathing  (AHI=68) is noted based on a 4% hypopnea desaturation criteria. The patient slept supine 7.6% of the night " "based on valid  recording time of 6.58 hours and is 1.1 times as likely to have apneas/hypopneas when supine. The apneas/hypopneas are  accompanied by severe oxygen desaturation (percent time below 90% SpO2: 47.6%, Min SpO2: 52.5%).    ASSESSMENT:    1. PATRIC The patient symptomatically has  excessive daytime sleepiness, snoring,  witnessed breathing pauses, excessive daytime fatigue, gasping for air in sleep, interrupted sleep and nocturia  with exam findings of "a crowded oral airway and elevated body mass index. The patient has medical co-morbidities of hyperlipidemia and impaired glucose tolerance,  which can be worsened by PATRIC. This warrants treatment.  Benefiting from CPAP use in terms of sleep continuity and daytime sleepiness. Lately poor compliance - since switching to full face mask he finds that one the pressure increased in the middle of the night it wakes him up. No significant mask leak was reported/registered.           PLAN:      Continue  for auto CPAP 5-18 cm drop to 5-13 cm H2O; Apollo Nguyen will keep me posted re: if that helps -> will check AHI at that time.    If comfortable therapeutic range can not be determined empirically, will consider in lab titration sleep study.   Continue with FFM Airfit F20 mask.    Will increase humidity to 6    I showed him how to change his humidity      Continue with the full face mask  Education: During our discussion today, we talked about the etiology of obstructive sleep apnea as well as the potential ramifications of untreated sleep apnea, which could include daytime sleepiness, hypertension, heart disease and/or stroke.      We discussed potential treatment options, which could include weight loss, body positioning, continuous positive airway pressure (CPAP), or referral for surgical consideration. The patient preferred CPAP option.     Discussed purpose of PAP therapy, health benefits of CPAP, as well as the potential ramifications of " untreated sleep apnea, which could include daytime sleepiness, hypertension, heart disease and/or stroke. An AHI of 15 is associated with increased risk CVD.     Regular replacement of CPAP mask, tubing and filter was recommended.    The patient was given open opportunity to ask questions and/or express concerns about treatment plan. Two point patient identifier confirmed.     Precautions: The patient was advised to abstain from driving should he feel sleepy or drowsy.    Follow up: me after 31 days  of PAP use.  31-minute visit. >50% spent counseling patient and coordination of care.                   [employer] (ipad on wheels) was used for translation from [place]    EPWORTH SLEEPINESS SCALE TOTAL SCORE  3/29/2022 1/5/2022   Total score 10 9       Apollo Nguyen is a 61 y.o. male seen today for CPAP follow up. Last seen on 9/15/2021    The patient reports improved sleep continuity and daytime sleepiness on PAP. ESS today is 9/24.  Denies break through snoring.  No dry mouth.   O aerophagia or air hunger. REPORTS  significant mask leaks and discomfort.    Very significant mask leak likely affecting AHI. Still, AHI looks improved as compared to based  Hard with nasakl mask - he is a mouth breather  APAP 5 to 18; 90% was 15    mpliance Report  Compliance  Payor Standard  Usage 12/06/2021 - 01/04/2022  Usage days 30/30 days (100%)  >= 4 hours 30 days (100%)  < 4 hours 0 days (0%)  Usage hours 165 hours 46 minutes  Average usage (total days) 5 hours 32 minutes  Average usage (days used) 5 hours 32 minutes  Median usage (days used) 5 hours 32 minutes  Total used hours (value since last reset - 01/04/2022) 331 hours  AirSense 11 AutoSet  Serial number [id number]  Mode AutoSet  Min Pressure 5 cmH2O  Max Pressure 18 cmH2O  EPR Fulltime  EPR level 1  Response Standard  Therapy  Pressure - cmH2O Median: 10.3 95th percentile: 13.7 Maximum: 15.3  Leaks - L/min Median: 39.2 95th percentile: 62.9 Maximum:  "74.6  Events per hour AI: 9.5 HI: 2.0 AHI: 11.5  Apnea Index Central: 0.0 Obstructive: 0.5 Unknown: 9.0  RERA Index 3.6  Cheyne-Yeboah respiration (average duration per night) 2 minutes       REVIEW OF SYSTEMS:   Sleep related symptoms as per HPI    reports weight gain  Reports occasional dyspnea on awakening  Reports occasional palpitations on awakening  Reports occasional acid reflux   Reports polyuria x2  Denies  mood diturbance  Denies  anemia  Reports occasional  muscle pain  Denies  Gait imbalance    Otherwise, a balance of 10 systems reviewed is negative.    PHYSICAL EXAM:  /74   Pulse 66   Ht 5' 5" (1.651 m)   Wt 101.1 kg (222 lb 12.8 oz)   BMI 37.08 kg/m²           ASSESSMENT:    1. PATRIC The patient symptomatically has  excessive daytime sleepiness, snoring,  witnessed breathing pauses, excessive daytime fatigue, gasping for air in sleep, interrupted sleep and nocturia  with exam findings of "a crowded oral airway and elevated body mass index. The patient has medical co-morbidities of hyperlipidemia and impaired glucose tolerance,  which can be worsened by PATRIC. This warrants treatment.           PLAN:      Continue  for auto CPAP 5-18 cm with the mask of a patient's choice was given to the patient.  I showed him how to tighten his Wisp; if it does not help, wants to try FFM - will be eligible in a couple of weeks.     Education: During our discussion today, we talked about the etiology of obstructive sleep apnea as well as the potential ramifications of untreated sleep apnea, which could include daytime sleepiness, hypertension, heart disease and/or stroke.      We discussed potential treatment options, which could include weight loss, body positioning, continuous positive airway pressure (CPAP), or referral for surgical consideration. The patient preferred CPAP option.     Discussed purpose of PAP therapy, health benefits of CPAP, as well as the potential ramifications of untreated sleep apnea, " which could include daytime sleepiness, hypertension, heart disease and/or stroke. An AHI of 15 is associated with increased risk CVD.     Regular replacement of CPAP mask, tubing and filter was recommended.    The patient was given open opportunity to ask questions and/or express concerns about treatment plan. Two point patient identifier confirmed.     Precautions: The patient was advised to abstain from driving should he feel sleepy or drowsy.    Follow up: me after 31 days  of PAP use.  31-minute visit. >50% spent counseling patient and coordination of care.

## 2022-11-14 ENCOUNTER — OFFICE VISIT (OUTPATIENT)
Dept: SLEEP MEDICINE | Facility: CLINIC | Age: 62
End: 2022-11-14
Payer: COMMERCIAL

## 2022-11-14 DIAGNOSIS — G47.30 SLEEP APNEA, UNSPECIFIED TYPE: Primary | ICD-10-CM

## 2022-11-14 PROCEDURE — 99214 PR OFFICE/OUTPT VISIT, EST, LEVL IV, 30-39 MIN: ICD-10-PCS | Mod: S$GLB,,, | Performed by: PSYCHIATRY & NEUROLOGY

## 2022-11-14 PROCEDURE — 99999 PR PBB SHADOW E&M-EST. PATIENT-LVL I: ICD-10-PCS | Mod: PBBFAC,,, | Performed by: PSYCHIATRY & NEUROLOGY

## 2022-11-14 PROCEDURE — 4010F PR ACE/ARB THEARPY RXD/TAKEN: ICD-10-PCS | Mod: CPTII,S$GLB,, | Performed by: PSYCHIATRY & NEUROLOGY

## 2022-11-14 PROCEDURE — 99999 PR PBB SHADOW E&M-EST. PATIENT-LVL I: CPT | Mod: PBBFAC,,, | Performed by: PSYCHIATRY & NEUROLOGY

## 2022-11-14 PROCEDURE — 4010F ACE/ARB THERAPY RXD/TAKEN: CPT | Mod: CPTII,S$GLB,, | Performed by: PSYCHIATRY & NEUROLOGY

## 2022-11-14 PROCEDURE — 99214 OFFICE O/P EST MOD 30 MIN: CPT | Mod: S$GLB,,, | Performed by: PSYCHIATRY & NEUROLOGY

## 2022-11-14 NOTE — PATIENT INSTRUCTIONS
SLEEP LAB ([facility] or [facility]) will contact you to schedulethe sleep study. Their number is [phone] (ext 2). Please call them if you do not hear from them in 2 weeks from now.  The [facility] is located on 7th floor of the [facility]; [facility] is located in [facility].    SLEEP CLINIC (my assistant) will call you when the sleep study results are ready - if you have not heard from us by 2 weeks from the date of the study, please call [phone] (ext 1) or you can use [facility] to contact me.    You are advised to abstain from driving should you feel sleepy or drowsy.

## 2022-11-14 NOTE — PROGRESS NOTES
Translation from [place]ian via the phone service    EPWORTH SLEEPINESS SCALE TOTAL SCORE  3/29/2022 1/5/2022   Total score 10 9       Apollo Wayne ChaRené is a 62 y.o. male seen today for PATRIC follow up. Last seen on 3/29/2022.      He finds that most recent mask was F20; however he states he returned the machine out of discomfort.    He is coming again by his PMD's referral to talk about the treatment options.  His study previously returned positive for very severe PATRIC AHI 68 (HST in 9/28/21)     The patient reports improved sleep continuity and daytime sleepiness on PAP. ESS today is 10/24.  Denies break through snoring.  ++ dry mouth.    aerophagia or air hunger.  NO significant mask leaks and discomfort.  APAP 5-18; 90% - 15 cm H2O  Compliance Report  Compliance  Payor Standard  Usage 02/26/2022 - 03/27/2022  Usage days 6/30 days (20%)  >= 4 hours 4 days (13%)  < 4 hours 2 days (7%)  Usage hours 23 hours 57 minutes  Average usage (total days) 48 minutes  Average usage (days used) 4 hours 0 minutes  Median usage (days used) 5 hours 1 minutes  Total used hours (value since last reset - 03/27/2022) 533 hours  AirSense 11 AutoSet  Serial number [id number]  Mode AutoSet  Min Pressure 5 cmH2O  Max Pressure 18 cmH2O  EPR Fulltime  EPR level 1  Response Standard  Therapy  Pressure - cmH2O Median: 11.0 95th percentile: 13.5 Maximum: 14.8  Leaks - L/min Median: 14.8 95th percentile: 54.0 Maximum: 74.6  Events per hour AI: 5.5 HI: 3.2 AHI: 8.7  Apnea Index Central: 0.0 Obstructive: 1.3 Unknown: 4.2  RERA Index 1.8  Cheyne-Yeboah respiration (average duration per night) 0 minutes (0%)      PHYSICAL EXAM:  There were no vitals taken for this visit.      Sleep Study: 10/21: Very Severe sleep disordered breathing  (AHI=68) is noted based on a 4% hypopnea desaturation criteria. The patient slept supine 7.6% of the night based on valid  recording time of 6.58 hours and is 1.1 times as likely to have apneas/hypopneas when  "supine. The apneas/hypopneas are  accompanied by severe oxygen desaturation (percent time below 90% SpO2: 47.6%, Min SpO2: 52.5%).    ASSESSMENT:    1. PATRIC The patient symptomatically has  excessive daytime sleepiness, snoring,  witnessed breathing pauses, excessive daytime fatigue, gasping for air in sleep, interrupted sleep and nocturia  with exam findings of "a crowded oral airway and elevated body mass index. The patient has medical co-morbidities of hyperlipidemia and impaired glucose tolerance,  which can be worsened by PATRIC. This warrants treatment.  Previously returned the machine due to incompliance; the pressure was bothering him, although he seems to have liked F20 mask.          PLAN:    PSG requal; in lab due to severity and hypoxemia on previous HST; hope to split.   Polanning to order the machine once the study results are received    If comfortable therapeutic range can not be determined empirically, will consider in lab titration sleep study.   Continue with FFM Airfit F20 mask.    Will increase humidity to 6    I showed him how to change his humidity      Continue with the full face mask  Education: During our discussion today, we talked about the etiology of obstructive sleep apnea as well as the potential ramifications of untreated sleep apnea, which could include daytime sleepiness, hypertension, heart disease and/or stroke.      We discussed potential treatment options, which could include weight loss, body positioning, continuous positive airway pressure (CPAP), or referral for surgical consideration. The patient preferred CPAP option.     Discussed purpose of PAP therapy, health benefits of CPAP, as well as the potential ramifications of untreated sleep apnea, which could include daytime sleepiness, hypertension, heart disease and/or stroke. An AHI of 15 is associated with increased risk CVD.     Regular replacement of CPAP mask, tubing and filter was recommended.    The patient was given open " opportunity to ask questions and/or express concerns about treatment plan. Two point patient identifier confirmed.     Precautions: The patient was advised to abstain from driving should he feel sleepy or drowsy.    Follow up: me after 31 days  of PAP use.  31-minute visit. >50% spent counseling patient and coordination of care.                   [employer] (ipad on wheels) was used for translation from [place]    EPWORTH SLEEPINESS SCALE TOTAL SCORE  3/29/2022 1/5/2022   Total score 10 9       Apollo Nguyen is a 62 y.o. male seen today for CPAP follow up. Last seen on 9/15/2021    The patient reports improved sleep continuity and daytime sleepiness on PAP. ESS today is 9/24.  Denies break through snoring.  No dry mouth.   O aerophagia or air hunger. REPORTS  significant mask leaks and discomfort.    Very significant mask leak likely affecting AHI. Still, AHI looks improved as compared to based  Hard with nasakl mask - he is a mouth breather  APAP 5 to 18; 90% was 15    mpliance Report  Compliance  Payor Standard  Usage 12/06/2021 - 01/04/2022  Usage days 30/30 days (100%)  >= 4 hours 30 days (100%)  < 4 hours 0 days (0%)  Usage hours 165 hours 46 minutes  Average usage (total days) 5 hours 32 minutes  Average usage (days used) 5 hours 32 minutes  Median usage (days used) 5 hours 32 minutes  Total used hours (value since last reset - 01/04/2022) 331 hours  AirSense 11 AutoSet  Serial number [id number]  Mode AutoSet  Min Pressure 5 cmH2O  Max Pressure 18 cmH2O  EPR Fulltime  EPR level 1  Response Standard  Therapy  Pressure - cmH2O Median: 10.3 95th percentile: 13.7 Maximum: 15.3  Leaks - L/min Median: 39.2 95th percentile: 62.9 Maximum: 74.6  Events per hour AI: 9.5 HI: 2.0 AHI: 11.5  Apnea Index Central: 0.0 Obstructive: 0.5 Unknown: 9.0  RERA Index 3.6  Cheyne-Yeboah respiration (average duration per night) 2 minutes       REVIEW OF SYSTEMS:   Sleep related symptoms as per HPI    reports weight  "gain  Reports occasional dyspnea on awakening  Reports occasional palpitations on awakening  Reports occasional acid reflux   Reports polyuria x2  Denies  mood diturbance  Denies  anemia  Reports occasional  muscle pain  Denies  Gait imbalance    Otherwise, a balance of 10 systems reviewed is negative.    PHYSICAL EXAM:  There were no vitals taken for this visit.          ASSESSMENT:    1. PATRIC The patient symptomatically has  excessive daytime sleepiness, snoring,  witnessed breathing pauses, excessive daytime fatigue, gasping for air in sleep, interrupted sleep and nocturia  with exam findings of "a crowded oral airway and elevated body mass index. The patient has medical co-morbidities of hyperlipidemia and impaired glucose tolerance,  which can be worsened by PATRIC. This warrants treatment.           PLAN:      Continue  for auto CPAP 5-18 cm with the mask of a patient's choice was given to the patient.  I showed him how to tighten his Wisp; if it does not help, wants to try FFM - will be eligible in a couple of weeks.     Education: During our discussion today, we talked about the etiology of obstructive sleep apnea as well as the potential ramifications of untreated sleep apnea, which could include daytime sleepiness, hypertension, heart disease and/or stroke.      We discussed potential treatment options, which could include weight loss, body positioning, continuous positive airway pressure (CPAP), or referral for surgical consideration. The patient preferred CPAP option.     Discussed purpose of PAP therapy, health benefits of CPAP, as well as the potential ramifications of untreated sleep apnea, which could include daytime sleepiness, hypertension, heart disease and/or stroke. An AHI of 15 is associated with increased risk CVD.     Regular replacement of CPAP mask, tubing and filter was recommended.    The patient was given open opportunity to ask questions and/or express concerns about treatment plan. Two " point patient identifier confirmed.     Precautions: The patient was advised to abstain from driving should he feel sleepy or drowsy.    Follow up: me after 31 days  of PAP use.  31-minute visit. >50% spent counseling patient and coordination of care.

## 2023-08-04 ENCOUNTER — HOSPITAL ENCOUNTER (INPATIENT)
Facility: HOSPITAL | Age: 63
LOS: 2 days | Discharge: HOME | DRG: 684 | End: 2023-08-06
Attending: EMERGENCY MEDICINE | Admitting: HOSPITALIST
Payer: COMMERCIAL

## 2023-08-04 DIAGNOSIS — R53.1 WEAKNESS: ICD-10-CM

## 2023-08-04 DIAGNOSIS — E86.0 DEHYDRATION: ICD-10-CM

## 2023-08-04 DIAGNOSIS — N17.9 ACUTE RENAL FAILURE, UNSPECIFIED ACUTE RENAL FAILURE TYPE: Primary | ICD-10-CM

## 2023-08-04 DIAGNOSIS — I95.9 HYPOTENSION, UNSPECIFIED HYPOTENSION TYPE: ICD-10-CM

## 2023-08-04 DIAGNOSIS — R42 DIZZINESS: ICD-10-CM

## 2023-08-04 PROBLEM — N17.0 ACUTE RENAL FAILURE WITH TUBULAR NECROSIS: Status: ACTIVE | Noted: 2023-08-04

## 2023-08-04 PROBLEM — E66.811 CLASS 1 OBESITY IN ADULT: Status: ACTIVE | Noted: 2023-08-04

## 2023-08-04 PROBLEM — E66.9 CLASS 1 OBESITY IN ADULT: Status: ACTIVE | Noted: 2023-08-04

## 2023-08-04 PROBLEM — B20 HIV DISEASE: Status: ACTIVE | Noted: 2023-08-04

## 2023-08-04 PROBLEM — I10 HYPERTENSION: Status: ACTIVE | Noted: 2023-08-04

## 2023-08-04 PROBLEM — R11.2 NAUSEA AND VOMITING: Status: ACTIVE | Noted: 2023-08-04

## 2023-08-04 LAB
ALBUMIN SERPL BCP-MCNC: 4.6 G/DL (ref 3.5–5.2)
ALP SERPL-CCNC: 68 U/L (ref 55–135)
ALT SERPL W/O P-5'-P-CCNC: 19 U/L (ref 10–44)
ANION GAP SERPL CALC-SCNC: 18 MMOL/L (ref 8–16)
AST SERPL-CCNC: 11 U/L (ref 10–40)
BACTERIA #/AREA URNS HPF: ABNORMAL /HPF
BILIRUB SERPL-MCNC: 1 MG/DL (ref 0.1–1)
BILIRUB UR QL STRIP: ABNORMAL
BUN SERPL-MCNC: 62 MG/DL (ref 8–23)
CALCIUM SERPL-MCNC: 8.9 MG/DL (ref 8.7–10.5)
CHLORIDE SERPL-SCNC: 96 MMOL/L (ref 95–110)
CLARITY UR: ABNORMAL
CO2 SERPL-SCNC: 23 MMOL/L (ref 23–29)
COLOR UR: YELLOW
CREAT SERPL-MCNC: 5.9 MG/DL (ref 0.5–1.4)
ERYTHROCYTE [DISTWIDTH] IN BLOOD BY AUTOMATED COUNT: 12.8 % (ref 11.5–14.5)
EST. GFR  (NO RACE VARIABLE): 10 ML/MIN/1.73 M^2
ESTIMATED AVG GLUCOSE: 114 MG/DL (ref 68–131)
GLUCOSE SERPL-MCNC: 113 MG/DL (ref 70–110)
GLUCOSE UR QL STRIP: NEGATIVE
HBA1C MFR BLD: 5.6 % (ref 4–5.6)
HCT VFR BLD AUTO: 47 % (ref 40–54)
HGB BLD-MCNC: 16.5 G/DL (ref 14–18)
HGB UR QL STRIP: NEGATIVE
HYALINE CASTS #/AREA URNS LPF: 3 /LPF
KETONES UR QL STRIP: NEGATIVE
LACTATE SERPL-SCNC: 1.2 MMOL/L (ref 0.5–2.2)
LEUKOCYTE ESTERASE UR QL STRIP: NEGATIVE
LIPASE SERPL-CCNC: 38 U/L (ref 4–60)
MCH RBC QN AUTO: 31.3 PG (ref 27–31)
MCHC RBC AUTO-ENTMCNC: 35.1 G/DL (ref 32–36)
MCV RBC AUTO: 89 FL (ref 82–98)
MICROSCOPIC COMMENT: ABNORMAL
NITRITE UR QL STRIP: NEGATIVE
PH UR STRIP: 5 [PH] (ref 5–8)
PLATELET # BLD AUTO: 196 K/UL (ref 150–450)
PMV BLD AUTO: 11.4 FL (ref 9.2–12.9)
POCT GLUCOSE: 114 MG/DL (ref 70–110)
POTASSIUM SERPL-SCNC: 4.2 MMOL/L (ref 3.5–5.1)
PROT SERPL-MCNC: 7.8 G/DL (ref 6–8.4)
PROT UR QL STRIP: ABNORMAL
RBC # BLD AUTO: 5.28 M/UL (ref 4.6–6.2)
RBC #/AREA URNS HPF: 1 /HPF (ref 0–4)
SODIUM SERPL-SCNC: 137 MMOL/L (ref 136–145)
SP GR UR STRIP: 1.02 (ref 1–1.03)
SQUAMOUS #/AREA URNS HPF: 1 /HPF
URN SPEC COLLECT METH UR: ABNORMAL
UROBILINOGEN UR STRIP-ACNC: ABNORMAL EU/DL
WBC # BLD AUTO: 10.97 K/UL (ref 3.9–12.7)
WBC #/AREA URNS HPF: 5 /HPF (ref 0–5)

## 2023-08-04 PROCEDURE — 83036 HEMOGLOBIN GLYCOSYLATED A1C: CPT | Performed by: EMERGENCY MEDICINE

## 2023-08-04 PROCEDURE — 99285 EMERGENCY DEPT VISIT HI MDM: CPT | Mod: 25

## 2023-08-04 PROCEDURE — 83605 ASSAY OF LACTIC ACID: CPT | Performed by: EMERGENCY MEDICINE

## 2023-08-04 PROCEDURE — 25000003 PHARM REV CODE 250: Performed by: HOSPITALIST

## 2023-08-04 PROCEDURE — 93010 EKG 12-LEAD: ICD-10-PCS | Mod: ,,, | Performed by: INTERNAL MEDICINE

## 2023-08-04 PROCEDURE — 87536 HIV-1 QUANT&REVRSE TRNSCRPJ: CPT | Performed by: HOSPITALIST

## 2023-08-04 PROCEDURE — 96360 HYDRATION IV INFUSION INIT: CPT

## 2023-08-04 PROCEDURE — 63600175 PHARM REV CODE 636 W HCPCS: Performed by: EMERGENCY MEDICINE

## 2023-08-04 PROCEDURE — 86361 T CELL ABSOLUTE COUNT: CPT | Performed by: HOSPITALIST

## 2023-08-04 PROCEDURE — 11000001 HC ACUTE MED/SURG PRIVATE ROOM

## 2023-08-04 PROCEDURE — 93010 ELECTROCARDIOGRAM REPORT: CPT | Mod: ,,, | Performed by: INTERNAL MEDICINE

## 2023-08-04 PROCEDURE — A4216 STERILE WATER/SALINE, 10 ML: HCPCS | Performed by: HOSPITALIST

## 2023-08-04 PROCEDURE — 63600175 PHARM REV CODE 636 W HCPCS: Performed by: HOSPITALIST

## 2023-08-04 PROCEDURE — 94760 N-INVAS EAR/PLS OXIMETRY 1: CPT

## 2023-08-04 PROCEDURE — 82962 GLUCOSE BLOOD TEST: CPT

## 2023-08-04 PROCEDURE — 99900035 HC TECH TIME PER 15 MIN (STAT)

## 2023-08-04 PROCEDURE — 81000 URINALYSIS NONAUTO W/SCOPE: CPT | Performed by: EMERGENCY MEDICINE

## 2023-08-04 PROCEDURE — 83690 ASSAY OF LIPASE: CPT | Performed by: EMERGENCY MEDICINE

## 2023-08-04 PROCEDURE — 63600175 PHARM REV CODE 636 W HCPCS: Performed by: INTERNAL MEDICINE

## 2023-08-04 PROCEDURE — 80053 COMPREHEN METABOLIC PANEL: CPT | Performed by: EMERGENCY MEDICINE

## 2023-08-04 PROCEDURE — 85027 COMPLETE CBC AUTOMATED: CPT | Performed by: EMERGENCY MEDICINE

## 2023-08-04 PROCEDURE — 93005 ELECTROCARDIOGRAM TRACING: CPT

## 2023-08-04 PROCEDURE — 27000190 HC CPAP FULL FACE MASK W/VALVE

## 2023-08-04 RX ORDER — ONDANSETRON 2 MG/ML
4 INJECTION INTRAMUSCULAR; INTRAVENOUS EVERY 8 HOURS PRN
Status: DISCONTINUED | OUTPATIENT
Start: 2023-08-04 | End: 2023-08-06 | Stop reason: HOSPADM

## 2023-08-04 RX ORDER — POLYETHYLENE GLYCOL 3350 17 G/17G
17 POWDER, FOR SOLUTION ORAL DAILY
Status: DISCONTINUED | OUTPATIENT
Start: 2023-08-05 | End: 2023-08-06 | Stop reason: HOSPADM

## 2023-08-04 RX ORDER — ASPIRIN 81 MG/1
81 TABLET ORAL
Status: ON HOLD | COMMUNITY
Start: 2023-07-14 | End: 2023-08-04 | Stop reason: DRUGHIGH

## 2023-08-04 RX ORDER — TESTOSTERONE GEL, 1% 10 MG/G
5 GEL TRANSDERMAL DAILY
COMMUNITY

## 2023-08-04 RX ORDER — ENOXAPARIN SODIUM 100 MG/ML
40 INJECTION SUBCUTANEOUS EVERY 24 HOURS
Status: DISCONTINUED | OUTPATIENT
Start: 2023-08-04 | End: 2023-08-04 | Stop reason: DRUGHIGH

## 2023-08-04 RX ORDER — SODIUM CHLORIDE, SODIUM LACTATE, POTASSIUM CHLORIDE, CALCIUM CHLORIDE 600; 310; 30; 20 MG/100ML; MG/100ML; MG/100ML; MG/100ML
INJECTION, SOLUTION INTRAVENOUS CONTINUOUS
Status: DISCONTINUED | OUTPATIENT
Start: 2023-08-04 | End: 2023-08-04

## 2023-08-04 RX ORDER — NALOXONE HCL 0.4 MG/ML
0.02 VIAL (ML) INJECTION
Status: DISCONTINUED | OUTPATIENT
Start: 2023-08-04 | End: 2023-08-06 | Stop reason: HOSPADM

## 2023-08-04 RX ORDER — SILDENAFIL 50 MG/1
TABLET, FILM COATED ORAL
COMMUNITY
Start: 2023-03-22 | End: 2023-10-05

## 2023-08-04 RX ORDER — ATORVASTATIN CALCIUM 10 MG/1
10 TABLET, FILM COATED ORAL
COMMUNITY
Start: 2023-07-14

## 2023-08-04 RX ORDER — HEPARIN SODIUM 5000 [USP'U]/ML
5000 INJECTION, SOLUTION INTRAVENOUS; SUBCUTANEOUS EVERY 12 HOURS
Status: DISCONTINUED | OUTPATIENT
Start: 2023-08-04 | End: 2023-08-06 | Stop reason: HOSPADM

## 2023-08-04 RX ORDER — FERROUS SULFATE, DRIED 160(50) MG
1 TABLET, EXTENDED RELEASE ORAL 2 TIMES DAILY WITH MEALS
Status: ON HOLD | COMMUNITY
End: 2023-08-04

## 2023-08-04 RX ORDER — TAMSULOSIN HYDROCHLORIDE 0.4 MG/1
0.4 CAPSULE ORAL DAILY
Status: DISCONTINUED | OUTPATIENT
Start: 2023-08-05 | End: 2023-08-06 | Stop reason: HOSPADM

## 2023-08-04 RX ORDER — SILDENAFIL CITRATE 50 MG/1
TABLET, FILM COATED ORAL
Status: ON HOLD | COMMUNITY
Start: 2023-07-17 | End: 2023-08-04 | Stop reason: DRUGHIGH

## 2023-08-04 RX ORDER — ATORVASTATIN CALCIUM 10 MG/1
10 TABLET, FILM COATED ORAL
Status: ON HOLD | COMMUNITY
Start: 2022-10-03 | End: 2023-08-04 | Stop reason: DRUGHIGH

## 2023-08-04 RX ORDER — SEMAGLUTIDE 2.68 MG/ML
2 INJECTION, SOLUTION SUBCUTANEOUS
Status: ON HOLD | COMMUNITY
Start: 2023-07-14 | End: 2023-08-04 | Stop reason: DRUGHIGH

## 2023-08-04 RX ORDER — IBUPROFEN 200 MG
24 TABLET ORAL
Status: DISCONTINUED | OUTPATIENT
Start: 2023-08-04 | End: 2023-08-06 | Stop reason: HOSPADM

## 2023-08-04 RX ORDER — PANTOPRAZOLE SODIUM 20 MG/1
20 TABLET, DELAYED RELEASE ORAL DAILY
COMMUNITY
Start: 2022-10-17

## 2023-08-04 RX ORDER — SEMAGLUTIDE 2.68 MG/ML
2 INJECTION, SOLUTION SUBCUTANEOUS
Status: ON HOLD | COMMUNITY
Start: 2023-04-19 | End: 2023-08-06 | Stop reason: HOSPADM

## 2023-08-04 RX ORDER — SODIUM CHLORIDE, SODIUM LACTATE, POTASSIUM CHLORIDE, CALCIUM CHLORIDE 600; 310; 30; 20 MG/100ML; MG/100ML; MG/100ML; MG/100ML
INJECTION, SOLUTION INTRAVENOUS CONTINUOUS
Status: DISCONTINUED | OUTPATIENT
Start: 2023-08-04 | End: 2023-08-05

## 2023-08-04 RX ORDER — TALC
6 POWDER (GRAM) TOPICAL NIGHTLY PRN
Status: DISCONTINUED | OUTPATIENT
Start: 2023-08-04 | End: 2023-08-06 | Stop reason: HOSPADM

## 2023-08-04 RX ORDER — TRIAMCINOLONE ACETONIDE 1 MG/ML
0.1 LOTION TOPICAL
Status: ON HOLD | COMMUNITY
End: 2023-08-04

## 2023-08-04 RX ORDER — ACETAMINOPHEN 500 MG
500 TABLET ORAL EVERY 6 HOURS PRN
COMMUNITY

## 2023-08-04 RX ORDER — ONDANSETRON 4 MG/1
8 TABLET, ORALLY DISINTEGRATING ORAL EVERY 8 HOURS PRN
Status: DISCONTINUED | OUTPATIENT
Start: 2023-08-04 | End: 2023-08-06 | Stop reason: HOSPADM

## 2023-08-04 RX ORDER — ACETAMINOPHEN 500 MG
5000 TABLET ORAL DAILY
COMMUNITY

## 2023-08-04 RX ORDER — TAMSULOSIN HYDROCHLORIDE 0.4 MG/1
0.4 CAPSULE ORAL NIGHTLY
COMMUNITY

## 2023-08-04 RX ORDER — METFORMIN HYDROCHLORIDE 500 MG/1
500 TABLET, EXTENDED RELEASE ORAL NIGHTLY
COMMUNITY
Start: 2022-10-03

## 2023-08-04 RX ORDER — ASPIRIN 81 MG/1
81 TABLET ORAL NIGHTLY
COMMUNITY
Start: 2022-10-03

## 2023-08-04 RX ORDER — IBUPROFEN 200 MG
16 TABLET ORAL
Status: DISCONTINUED | OUTPATIENT
Start: 2023-08-04 | End: 2023-08-06 | Stop reason: HOSPADM

## 2023-08-04 RX ORDER — EMTRICITABINE AND TENOFOVIR DISOPROXIL FUMARATE 200; 300 MG/1; MG/1
1 TABLET, FILM COATED ORAL DAILY
Status: ON HOLD | COMMUNITY
End: 2023-08-04

## 2023-08-04 RX ORDER — SEMAGLUTIDE 1.34 MG/ML
0.5 INJECTION, SOLUTION SUBCUTANEOUS
Status: ON HOLD | COMMUNITY
Start: 2023-02-23 | End: 2023-08-04 | Stop reason: DRUGHIGH

## 2023-08-04 RX ORDER — LORATADINE 10 MG/1
10 TABLET ORAL DAILY PRN
COMMUNITY
Start: 2022-09-06

## 2023-08-04 RX ORDER — TESTOSTERONE 40.5 MG/2.5G
GEL TOPICAL
Status: ON HOLD | COMMUNITY
Start: 2023-07-17 | End: 2023-08-04 | Stop reason: DRUGHIGH

## 2023-08-04 RX ORDER — METFORMIN HYDROCHLORIDE 500 MG/1
500 TABLET, EXTENDED RELEASE ORAL
Status: ON HOLD | COMMUNITY
Start: 2023-07-14 | End: 2023-08-04 | Stop reason: DRUGHIGH

## 2023-08-04 RX ORDER — KETOCONAZOLE 20 MG/G
2 CREAM TOPICAL
Status: ON HOLD | COMMUNITY
End: 2023-08-04

## 2023-08-04 RX ORDER — OMEGA-3 FATTY ACIDS 1000 MG
2 CAPSULE ORAL
Status: ON HOLD | COMMUNITY
End: 2023-08-04 | Stop reason: SDUPTHER

## 2023-08-04 RX ORDER — ATORVASTATIN CALCIUM 10 MG/1
10 TABLET, FILM COATED ORAL DAILY
Status: DISCONTINUED | OUTPATIENT
Start: 2023-08-05 | End: 2023-08-06 | Stop reason: HOSPADM

## 2023-08-04 RX ORDER — SODIUM CHLORIDE 0.9 % (FLUSH) 0.9 %
10 SYRINGE (ML) INJECTION EVERY 8 HOURS
Status: DISCONTINUED | OUTPATIENT
Start: 2023-08-04 | End: 2023-08-06 | Stop reason: HOSPADM

## 2023-08-04 RX ORDER — LISINOPRIL 20 MG/1
20 TABLET ORAL DAILY
Status: ON HOLD | COMMUNITY
Start: 2023-04-26 | End: 2023-08-06 | Stop reason: SDUPTHER

## 2023-08-04 RX ORDER — PODOFILOX 5 MG/ML
SOLUTION TOPICAL
Status: ON HOLD | COMMUNITY
Start: 2023-03-29 | End: 2023-08-04

## 2023-08-04 RX ORDER — GLUCAGON 1 MG
1 KIT INJECTION
Status: DISCONTINUED | OUTPATIENT
Start: 2023-08-04 | End: 2023-08-06 | Stop reason: HOSPADM

## 2023-08-04 RX ORDER — SILDENAFIL 25 MG/1
25 TABLET, FILM COATED ORAL
Status: ON HOLD | COMMUNITY
End: 2023-08-04 | Stop reason: DRUGHIGH

## 2023-08-04 RX ORDER — MULTIVITAMIN
1 TABLET ORAL DAILY
COMMUNITY

## 2023-08-04 RX ORDER — ENOXAPARIN SODIUM 100 MG/ML
30 INJECTION SUBCUTANEOUS EVERY 24 HOURS
Status: DISCONTINUED | OUTPATIENT
Start: 2023-08-04 | End: 2023-08-04

## 2023-08-04 RX ORDER — IBUPROFEN 600 MG/1
600 TABLET ORAL EVERY 6 HOURS PRN
Status: ON HOLD | COMMUNITY
End: 2023-08-04

## 2023-08-04 RX ORDER — ACETAMINOPHEN 500 MG
1000 TABLET ORAL EVERY 8 HOURS PRN
Status: DISCONTINUED | OUTPATIENT
Start: 2023-08-04 | End: 2023-08-06 | Stop reason: HOSPADM

## 2023-08-04 RX ORDER — ACETAMINOPHEN 325 MG/1
650 TABLET ORAL EVERY 4 HOURS PRN
Status: DISCONTINUED | OUTPATIENT
Start: 2023-08-04 | End: 2023-08-06 | Stop reason: HOSPADM

## 2023-08-04 RX ADMIN — SODIUM CHLORIDE, POTASSIUM CHLORIDE, SODIUM LACTATE AND CALCIUM CHLORIDE 1000 ML: 600; 310; 30; 20 INJECTION, SOLUTION INTRAVENOUS at 01:08

## 2023-08-04 RX ADMIN — HEPARIN SODIUM 5000 UNITS: 5000 INJECTION INTRAVENOUS; SUBCUTANEOUS at 10:08

## 2023-08-04 RX ADMIN — SODIUM CHLORIDE, POTASSIUM CHLORIDE, SODIUM LACTATE AND CALCIUM CHLORIDE: 600; 310; 30; 20 INJECTION, SOLUTION INTRAVENOUS at 06:08

## 2023-08-04 RX ADMIN — Medication 10 ML: at 11:08

## 2023-08-04 NOTE — H&P
[facility] Medicine  History & Physical    Patient Name: Apollo Nguyen  MRN: [id number]  Patient Class: IP- Inpatient  Admission Date: 8/4/2023  Attending Physician: Adelfo Benito MD  Primary Care Provider: Emma, Primary Doctor         Patient information was obtained from patient, past medical records and ER records.     Subjective:     Principal Problem:Acute renal failure with tubular necrosis    Chief Complaint:   Chief Complaint   Patient presents with    Dizziness     Pt reports dizziness, blurred vision, and generalized weakness  intermittent  x 2 years, worse over the past 4-5 mths, pt walks with steady gait, but complains of middle back pain,  VAN neg in triage, emesis reported yesterday, hx of HIV         HPI: Mr. Nguyen is a 63-year-old man with HIV, hypertension, and hyperlipidemia who presents with 3 years of intermittent dizziness and blurry vision which has recently been worsening.  States that yesterday he had episode nausea and vomiting in the morning, followed by large amount of emesis at around 1:00 p.m. that day.  States that he was able to eat a little bit that night, but again this morning he felt nausea and had some emesis again.  Denies any diarrhea.  Reports some abdominal discomfort but otherwise no pain.  No headache, fevers, chills, chest pain, or shortness of breath.  Reports he is compliant with all of his medications, including Biktarvy for his HIV.  No recent medicine changes other than addition of Ozempic to his regimen in December.  Of any over-the-counter medications.  Currently, he is not having any dizziness or vision changes.  Denies headache.  Seen with virtual .      Past Medical History:   Diagnosis Date    High cholesterol     HIV disease     Hypertension     Prostate disorder        No past surgical history on file.    Review of patient's allergies indicates:  No Known Allergies    No current  facility-administered medications on file prior to encounter.     Current Outpatient Medications on File Prior to Encounter   Medication Sig    ATORVASTATIN CALCIUM (ATORVASTATIN ORAL) Take by mouth.    fish oil-omega-3 fatty acids 300-1,000 mg capsule Take 2 g by mouth once daily.    lisinopriL (PRINIVIL,ZESTRIL) 20 MG tablet Take 20 mg by mouth.    loratadine (CLARITIN) 10 mg tablet Take 10 mg by mouth.    pantoprazole (PROTONIX) 20 MG tablet Take 20 mg by mouth.    tamsulosin (FLOMAX) 0.4 mg Cap Take 0.4 mg by mouth.    BIKTARVY -25 mg (25 kg or greater) Take 1 tablet by mouth.    [DISCONTINUED] CALCIUM ACETATE ORAL Take by mouth.    [DISCONTINUED] DOLUTEGRAVIR SODIUM (TIVICAY ORAL) Take by mouth.    [DISCONTINUED] emtricitabine-tenofovir alafen (DESCOVY) 200-25 mg Tab Take by mouth once daily.    [DISCONTINUED] gabapentin (NEURONTIN) 300 MG capsule Take 1-2 capsules (300-600 mg total) by mouth every evening.    [DISCONTINUED] PANTOPRAZOLE SODIUM (PANTOPRAZOLE ORAL) Take by mouth.    [DISCONTINUED] tamsulosin (FLOMAX) 0.4 mg Cap Take 0.4 mg by mouth once daily.     Family History    None       Tobacco Use    Smoking status: Former     Current packs/day: 0.00    Smokeless tobacco: Never   Substance and Sexual Activity    Alcohol use: No    Drug use: No     Comment: used in the past    Sexual activity: Not on file     Review of Systems   Constitutional:  Negative for chills, diaphoresis and fever.   HENT:  Negative for congestion and sore throat.    Eyes:  Positive for visual disturbance. Negative for discharge.   Respiratory:  Negative for cough and shortness of breath.    Cardiovascular:  Negative for chest pain and leg swelling.   Gastrointestinal:  Positive for nausea and vomiting. Negative for abdominal pain.   Genitourinary:  Negative for dysuria and flank pain.   Musculoskeletal:  Negative for arthralgias and joint swelling.   Skin:  Negative for rash and wound.    Allergic/Immunologic: Negative for immunocompromised state.   Neurological:  Positive for dizziness and light-headedness. Negative for headaches.   Psychiatric/Behavioral:  Negative for agitation and confusion.      Objective:     Vital Signs (Most Recent):  Temp: 98.7 °F (37.1 °C) (08/04/23 1229)  Pulse: (!) 55 (08/04/23 1633)  Resp: (!) 21 (08/04/23 1633)  BP: 112/64 (08/04/23 1633)  SpO2: 96 % (08/04/23 1633) Vital Signs (24h Range):  Temp:  [98.7 °F (37.1 °C)] 98.7 °F (37.1 °C)  Pulse:  [55-66] 55  Resp:  [18-32] 21  SpO2:  [95 %-99 %] 96 %  BP: ()/(54-64) 112/64     Weight: 93 kg (205 lb)  Body mass index is 34.11 kg/m².     Physical Exam  Vitals reviewed.   Constitutional:       General: He is not in acute distress.     Appearance: He is well-developed. He is not diaphoretic.   HENT:      Head: Normocephalic and atraumatic.      Nose: Nose normal.   Eyes:      General: No scleral icterus.     Pupils: Pupils are equal, round, and reactive to light.   Neck:      Vascular: No JVD.      Trachea: No tracheal deviation.   Cardiovascular:      Rate and Rhythm: Normal rate and regular rhythm.      Heart sounds: Normal heart sounds. No murmur heard.     No friction rub. No gallop.   Pulmonary:      Effort: Pulmonary effort is normal. No respiratory distress.      Breath sounds: Normal breath sounds.   Abdominal:      General: There is no distension.      Palpations: Abdomen is soft. There is no mass.      Tenderness: There is no abdominal tenderness.      Hernia: No hernia is present.   Musculoskeletal:         General: No deformity.      Cervical back: Normal range of motion.   Skin:     General: Skin is warm and dry.      Findings: No rash.   Neurological:      Mental Status: He is alert and oriented to person, place, and time.   Psychiatric:         Behavior: Behavior normal.              CRANIAL NERVES     CN III, IV, VI   Pupils are equal, round, and reactive to light.       Significant Labs: All pertinent  labs within the past 24 hours have been reviewed.    Significant Imaging: I have reviewed all pertinent imaging results/findings within the past 24 hours.    Assessment/Plan:     * Acute renal failure with tubular necrosis  Patient with acute kidney injury/acute renal failure likely due to acute tubular necrosis caused by volume loss MARCELA is currently worsening. Baseline creatinine 1.0 - Labs reviewed- Renal function/electrolytes with Estimated Creatinine Clearance: 13.8 mL/min (A) (based on SCr of 5.9 mg/dL (H)). according to latest data. Monitor urine output and serial BMP and adjust therapy as needed. Avoid nephrotoxins and renally dose meds for GFR listed above.-reviewed outside hospital records and had creatinine 1.0 in May of this year which is most recent creatinine    Class 1 obesity in adult  Body mass index is 34.11 kg/m². Morbid obesity complicates all aspects of disease management from diagnostic modalities to treatment. Weight loss encouraged and health benefits explained to patient.         Nausea and vomiting  -present on arrival   -did report that he was able to eat and has an appetite now   -p.r.n.  Zofran   -IV fluids      Hypotension  -initially presented with hypotension which has improved following IV fluids in the ED  -continue to monitor closely      Hypertension  -on lisinopril at home; will hold now   -hypotensive on arrival      HIV disease  This patient in known to have HIV but do not have recorded CD4 count or viral load; will check. Patient is on HAART. Will continue HAART. Prophylaxis was not indicated at this time. Continue to monitor routine labs.   We will not consult Infectious disease at this time. Other HIV-associated diseases are not present.        PATRIC (obstructive sleep apnea)  -CPAP        VTE Risk Mitigation (From admission, onward)         Ordered     enoxaparin injection 30 mg  Every 24 hours         08/04/23 1657     IP VTE HIGH RISK PATIENT  Once         08/04/23 1647      Place sequential compression device  Until discontinued         08/04/23 6247                           Adelfo Benito MD  Department of Hospital Medicine  [facility] - Emergency Dept

## 2023-08-04 NOTE — PHARMACY MED REC
"Admission Medication History     The home medication history was taken by Ryanne Ward CPhT.    Medication history obtained from, Patient Verified    You may go to "Admission" then "Reconcile Home Medications" tabs to review and/or act upon these items.     The home medication list has been updated by the Pharmacy department.   Please read ALL comments highlighted in yellow.   Please address this information as you see fit.    Feel free to contact us if you have any questions or require assistance.      The medications listed below were removed from the home medication list.  Please reorder if appropriate:  Patient reports no longer taking the following medication(s):  Calcium Acetate 667 mg  Os-Bruce 500+D3 500 mg-200 unit  Dolutegravir 50 mg  Descovy 200-25 mg  Truvada 200-300 mg  Gabapentin 300 mg  Ibuprofen 600 mg  Ketoconazole 2% cream  Podofilox 0.5% solution  Triamcinolone 0.1% lotion      Ryanne Ward CPhT.  Ext [phone]               .          "

## 2023-08-04 NOTE — ASSESSMENT & PLAN NOTE
This patient in known to have HIV but do not have recorded CD4 count or viral load; will check. Patient is on HAART. Will continue HAART. Prophylaxis was not indicated at this time. Continue to monitor routine labs.   We will not consult Infectious disease at this time. Other HIV-associated diseases are not present.       Yes

## 2023-08-04 NOTE — ASSESSMENT & PLAN NOTE
Body mass index is 34.11 kg/m². Morbid obesity complicates all aspects of disease management from diagnostic modalities to treatment. Weight loss encouraged and health benefits explained to patient.

## 2023-08-04 NOTE — HPI
Mr. Nguyen is a 63-year-old man with HIV, hypertension, and hyperlipidemia who presents with 3 years of intermittent dizziness and blurry vision which has recently been worsening.  States that yesterday he had episode nausea and vomiting in the morning, followed by large amount of emesis at around 1:00 p.m. that day.  States that he was able to eat a little bit that night, but again this morning he felt nausea and had some emesis again.  Denies any diarrhea.  Reports some abdominal discomfort but otherwise no pain.  No headache, fevers, chills, chest pain, or shortness of breath.  Reports he is compliant with all of his medications, including Biktarvy for his HIV.  No recent medicine changes other than addition of Ozempic to his regimen in December.  Of any over-the-counter medications.  Currently, he is not having any dizziness or vision changes.  Denies headache.  Seen with virtual .

## 2023-08-04 NOTE — PROGRESS NOTES
Pharmacist Renal Dose Adjustment Note    Apollo Nguyen is a 63 y.o. male being treated with the medication enoxaparin     Patient Data:    Vital Signs (Most Recent):  Temp: 98.7 °F (37.1 °C) (08/04/23 1229)  Pulse: (!) 55 (08/04/23 1633)  Resp: (!) 21 (08/04/23 1633)  BP: 112/64 (08/04/23 1633)  SpO2: 96 % (08/04/23 1633) Vital Signs (72h Range):  Temp:  [98.7 °F (37.1 °C)]   Pulse:  [55-66]   Resp:  [18-32]   BP: ()/(54-64)   SpO2:  [95 %-99 %]      Recent Labs   Lab 08/04/23  1256   CREATININE 5.9*     Serum creatinine: 5.9 mg/dL (H) 08/04/23 1256  Estimated creatinine clearance: 13.8 mL/min (A)    Medication:enoxaparin dose: 40 mg frequency Q 24 hrs will be changed to medication:enoxaparin dose:30 mg frequency:Q 24 hrs    Pharmacist's Name: Janeth Walker  Pharmacist's Extension: [phone]

## 2023-08-04 NOTE — ASSESSMENT & PLAN NOTE
-present on arrival   -did report that he was able to eat and has an appetite now   -p.r.n.  Zofran   -IV fluids

## 2023-08-04 NOTE — SUBJECTIVE & OBJECTIVE
Past Medical History:   Diagnosis Date    High cholesterol     HIV disease     Hypertension     Prostate disorder        No past surgical history on file.    Review of patient's allergies indicates:  No Known Allergies    No current facility-administered medications on file prior to encounter.     Current Outpatient Medications on File Prior to Encounter   Medication Sig    ATORVASTATIN CALCIUM (ATORVASTATIN ORAL) Take by mouth.    fish oil-omega-3 fatty acids 300-1,000 mg capsule Take 2 g by mouth once daily.    lisinopriL (PRINIVIL,ZESTRIL) 20 MG tablet Take 20 mg by mouth.    loratadine (CLARITIN) 10 mg tablet Take 10 mg by mouth.    pantoprazole (PROTONIX) 20 MG tablet Take 20 mg by mouth.    tamsulosin (FLOMAX) 0.4 mg Cap Take 0.4 mg by mouth.    BIKTARVY -25 mg (25 kg or greater) Take 1 tablet by mouth.    [DISCONTINUED] CALCIUM ACETATE ORAL Take by mouth.    [DISCONTINUED] DOLUTEGRAVIR SODIUM (TIVICAY ORAL) Take by mouth.    [DISCONTINUED] emtricitabine-tenofovir alafen (DESCOVY) 200-25 mg Tab Take by mouth once daily.    [DISCONTINUED] gabapentin (NEURONTIN) 300 MG capsule Take 1-2 capsules (300-600 mg total) by mouth every evening.    [DISCONTINUED] PANTOPRAZOLE SODIUM (PANTOPRAZOLE ORAL) Take by mouth.    [DISCONTINUED] tamsulosin (FLOMAX) 0.4 mg Cap Take 0.4 mg by mouth once daily.     Family History    None       Tobacco Use    Smoking status: Former     Current packs/day: 0.00    Smokeless tobacco: Never   Substance and Sexual Activity    Alcohol use: No    Drug use: No     Comment: used in the past    Sexual activity: Not on file     Review of Systems   Constitutional:  Negative for chills, diaphoresis and fever.   HENT:  Negative for congestion and sore throat.    Eyes:  Positive for visual disturbance. Negative for discharge.   Respiratory:  Negative for cough and shortness of breath.    Cardiovascular:  Negative for chest pain and leg swelling.   Gastrointestinal:  Positive for nausea and  vomiting. Negative for abdominal pain.   Genitourinary:  Negative for dysuria and flank pain.   Musculoskeletal:  Negative for arthralgias and joint swelling.   Skin:  Negative for rash and wound.   Allergic/Immunologic: Negative for immunocompromised state.   Neurological:  Positive for dizziness and light-headedness. Negative for headaches.   Psychiatric/Behavioral:  Negative for agitation and confusion.      Objective:     Vital Signs (Most Recent):  Temp: 98.7 °F (37.1 °C) (08/04/23 1229)  Pulse: (!) 55 (08/04/23 1633)  Resp: (!) 21 (08/04/23 1633)  BP: 112/64 (08/04/23 1633)  SpO2: 96 % (08/04/23 1633) Vital Signs (24h Range):  Temp:  [98.7 °F (37.1 °C)] 98.7 °F (37.1 °C)  Pulse:  [55-66] 55  Resp:  [18-32] 21  SpO2:  [95 %-99 %] 96 %  BP: ()/(54-64) 112/64     Weight: 93 kg (205 lb)  Body mass index is 34.11 kg/m².     Physical Exam  Vitals reviewed.   Constitutional:       General: He is not in acute distress.     Appearance: He is well-developed. He is not diaphoretic.   HENT:      Head: Normocephalic and atraumatic.      Nose: Nose normal.   Eyes:      General: No scleral icterus.     Pupils: Pupils are equal, round, and reactive to light.   Neck:      Vascular: No JVD.      Trachea: No tracheal deviation.   Cardiovascular:      Rate and Rhythm: Normal rate and regular rhythm.      Heart sounds: Normal heart sounds. No murmur heard.     No friction rub. No gallop.   Pulmonary:      Effort: Pulmonary effort is normal. No respiratory distress.      Breath sounds: Normal breath sounds.   Abdominal:      General: There is no distension.      Palpations: Abdomen is soft. There is no mass.      Tenderness: There is no abdominal tenderness.      Hernia: No hernia is present.   Musculoskeletal:         General: No deformity.      Cervical back: Normal range of motion.   Skin:     General: Skin is warm and dry.      Findings: No rash.   Neurological:      Mental Status: He is alert and oriented to person,  place, and time.   Psychiatric:         Behavior: Behavior normal.              CRANIAL NERVES     CN III, IV, VI   Pupils are equal, round, and reactive to light.       Significant Labs: All pertinent labs within the past 24 hours have been reviewed.    Significant Imaging: I have reviewed all pertinent imaging results/findings within the past 24 hours.

## 2023-08-04 NOTE — ED PROVIDER NOTES
Emergency Department Encounter  Provider Note    Apollo Nguyen  [id number]  8/4/2023    Evaluation:    History:     Chief Complaint   Patient presents with    Dizziness     Pt reports dizziness, blurred vision, and generalized weakness  intermittent  x 2 years, worse over the past 4-5 mths, pt walks with steady gait, but complains of middle back pain,  VAN neg in triage, emesis reported yesterday, hx of HIV        History of Present Illness:  Apollo Nguyen is a 63 y.o. male who has a past medical history of High cholesterol, HIV disease, Hypertension, and Prostate disorder.    The patient presents to the ED due to multiple complaints.    He states for the last 2 years he has had ongoing episodes of dizziness, weakness, back pain, blurry vision, and kidney pain.  He states in the past they have not been as bad, but over the last several months the symptoms have been becoming more severe.  He has history of HIV, hypertension, hyperlipidemia, and has been compliant with all of his medications.  He saw his PCP 2 weeks ago, and was told his symptoms were due to his oxygen being low.  Referred to another doctor but has not yet been given any appointment information.     He denies any associated fever, chest pain, shortness of breath, vomiting, abdominal pain, or urinary complaints.     #[id number] used for professional medical interpretation.         Past Medical History:   Diagnosis Date    High cholesterol     HIV disease     Hypertension     Prostate disorder      No past surgical history on file.  No family history on file.  Social History     Socioeconomic History    Marital status:    Tobacco Use    Smoking status: Former     Current packs/day: 0.00    Smokeless tobacco: Never   Substance and Sexual Activity    Alcohol use: No    Drug use: No     Comment: used in the past     Review of patient's allergies indicates:  No Known Allergies    Review of Systems    Constitutional:  Positive for fatigue. Negative for fever.   Eyes:  Positive for visual disturbance.   Musculoskeletal:  Positive for back pain.   Neurological:  Positive for dizziness, weakness and light-headedness.       Physical Exam:     Initial Vitals [08/04/23 1229]   BP Pulse Resp Temp SpO2   (!) 93/54 66 18 98.7 °F (37.1 °C) 99 %      MAP       --         Physical Exam    Nursing note and vitals reviewed.  Constitutional: He appears well-developed and well-nourished. He is not diaphoretic. No distress.   HENT:   Head: Normocephalic and atraumatic.   Mouth/Throat: Oropharynx is clear and moist.   Eyes: EOM are normal. Pupils are equal, round, and reactive to light.   Neck: No tracheal deviation present.   Cardiovascular:  Normal rate, regular rhythm, normal heart sounds and intact distal pulses.           Pulmonary/Chest: Breath sounds normal. No stridor. No respiratory distress.   Abdominal: Abdomen is soft. He exhibits no distension and no mass. There is no abdominal tenderness.   Musculoskeletal:         General: No edema. Normal range of motion.     Neurological: He is alert and oriented to person, place, and time. No cranial nerve deficit or sensory deficit.   Skin: Skin is warm and dry. Capillary refill takes less than 2 seconds. No rash noted.   Psychiatric: He has a normal mood and affect. His behavior is normal. Thought content normal.         ED Course:   Procedures    Medical Decision Making:    History Acquisition:   Additional historians utilized:  none    Prior medical records were reviewed:   Followed by Colorectal Surgery for colon cancer, last visit 5/9  Followed by ID for HIV  Seen by Sleep Medicine for PATRIC 3/29.     The patient's list of active medical problems, social history, medications, and allergies as documented has been reviewed.     Differential Diagnoses:   Based on available information and initial assessment, Differential Diagnosis includes, but is not limited to:  CVA/TIA,  vertigo, anemia/blood loss, cardiogenic shock, arrhythmia, orthostatic hypotension, dehydration, medication side effect, vitamin deficiency, liver disease, hypothyroidism, drug intoxication/withdrawal, metabolic derangement.        EKG:   EKG interpretation by ED attending physician:  NSR, rate 63, RBBB, no ST changes, no acute ischemia, normal intervals.  No prior for comparison.       Labs:     Labs Reviewed   CBC WITHOUT DIFFERENTIAL - Abnormal; Notable for the following components:       Result Value    MCH 31.3 (*)     All other components within normal limits   COMPREHENSIVE METABOLIC PANEL - Abnormal; Notable for the following components:    Glucose 113 (*)     BUN 62 (*)     Creatinine 5.9 (*)     eGFR 10 (*)     Anion Gap 18 (*)     All other components within normal limits   URINALYSIS, REFLEX TO URINE CULTURE - Abnormal; Notable for the following components:    Appearance, UA Hazy (*)     Protein, UA 1+ (*)     Bilirubin (UA) 1+ (*)     Urobilinogen, UA 2.0-3.0 (*)     All other components within normal limits    Narrative:     Specimen Source->Urine   URINALYSIS MICROSCOPIC - Abnormal; Notable for the following components:    Hyaline Casts, UA 3 (*)     All other components within normal limits    Narrative:     Specimen Source->Urine   POCT GLUCOSE - Abnormal; Notable for the following components:    POCT Glucose 114 (*)     All other components within normal limits   LIPASE   LACTIC ACID, PLASMA   HEMOGLOBIN A1C   HEMOGLOBIN A1C     Independent review of the labs ordered include:   See ED course    Imaging:     Imaging Results              CT Abdomen Pelvis  Without Contrast (Final result)  Result time 08/04/23 16:20:36      Final result by Camacho Franklin MD (08/04/23 16:20:36)                   Impression:      1. No findings to suggest obstructive uropathy.  2. Surgical changes of the large bowel, no obstruction.  3. Please see above for additional findings.      Electronically signed by: Camacho  MD Rigoberto  Date:    08/04/2023  Time:    16:20               Narrative:    EXAMINATION:  CT ABDOMEN PELVIS WITHOUT CONTRAST    CLINICAL HISTORY:  Pancreatitis, acute, severe;Abdominal pain, acute, nonlocalized;    TECHNIQUE:  Low dose axial images, sagittal and coronal reformations were obtained from the lung bases to the pubic symphysis.  30 mL of oral Omnipaque 350 was administered.    COMPARISON:  None    FINDINGS:  Images of the lower thorax are remarkable for bilateral dependent atelectasis/scarring.    The liver, spleen, pancreas, gallbladder and adrenal glands have a grossly unremarkable noncontrast appearance.  There is no biliary dilation or ascites.  The stomach is decompressed without wall thickening.  No significant abdominal lymphadenopathy.    The kidneys have a grossly unremarkable noncontrast appearance without hydronephrosis or nephrolithiasis.  The bilateral ureters are unremarkable without calculi seen.  The urinary bladder is unremarkable.  The prostate is not enlarged.    There is surgical change of partial colectomy, the anastomosis is patent.  The terminal ileum and appendix are unremarkable.  The small bowel is grossly unremarkable.  There are a few scattered shotty periaortic, pericaval, and mesenteric lymph nodes.  No focal organized pelvic fluid collection.    There is osteopenia.  There are degenerative changes of the bilateral sacroiliac joints, and spine.  Schmorl's node involves the superior endplates of T11 and T12.  There are multiple scattered inguinal lymph nodes without significant enlargement.                                       CT Head Without Contrast (Final result)  Result time 08/04/23 15:52:34      Final result by Camacho Franklin MD (08/04/23 15:52:34)                   Impression:      1. No acute intracranial abnormalities noting sequela of chronic microvascular ischemic change and senescent change.  2. Left sphenoid sinus disease.      Electronically signed  by: Camacho Franklin MD  Date:    08/04/2023  Time:    15:52               Narrative:    EXAMINATION:  CT HEAD WITHOUT CONTRAST    CLINICAL HISTORY:  Mental status change, unknown cause;Headache, chronic, new features or increased frequency;Headache, new or worsening (Age >= 50y);    TECHNIQUE:  Low dose axial images were obtained through the head.  Coronal and sagittal reformations were also performed. Contrast was not administered.    COMPARISON:  None.    FINDINGS:  There is generalized cerebral volume loss.  There is hypoattenuation in a periventricular fashion, likely sequela of chronic microvascular ischemic change.  There is no evidence of acute major vascular territory infarct, hemorrhage, or mass.  There is no hydrocephalus.  There are no abnormal extra-axial fluid collections.  There is aerated secretion within the left sphenoid sinus, otherwise the visualized paranasal sinuses and mastoid air cells are clear, and there is no evidence of calvarial fracture.  The visualized soft tissues are unremarkable.                                       X-Ray Chest AP Portable (Final result)  Result time 08/04/23 15:05:39      Final result by Camacho Franklin MD (08/04/23 15:05:39)                   Impression:      1. Interstitial findings are accentuated by habitus, no large focal consolidation.  Interstitial edema is a consideration, correlation is needed in this hypoventilatory exam.      Electronically signed by: Camacho Franklin MD  Date:    08/04/2023  Time:    15:05               Narrative:    EXAMINATION:  XR CHEST AP PORTABLE    CLINICAL HISTORY:  dizziness, hypotension, eval for infection;    TECHNIQUE:  Single frontal view of the chest was performed.    COMPARISON:  None    FINDINGS:  The cardiomediastinal silhouette is not enlarged noting calcification of the aorta..  There is no pleural effusion.  The trachea is midline.  The lungs are symmetrically expanded bilaterally with coarse interstitial  attenuation accentuated by habitus..  No large focal consolidation seen.  There is no pneumothorax.  The osseous structures are remarkable for degenerative change..                                         Additional Consideration:   Additional testing considered during clinical course: none    Social determinants of health considered during development of treatment plan include: poor access to care, language barrier    Current co-morbidities considered which impacted clinical decision making: HIV, HTN, HLD, PATRIC    Case discussed with additional provider: [facility]     Medications   lxfjpurrf-qooromcz-senxxqw ala -25 mg (25 kg or greater) 1 tablet (has no administration in time range)   tamsulosin 24 hr capsule 0.4 mg (has no administration in time range)   atorvastatin tablet 10 mg (has no administration in time range)   sodium chloride 0.9% flush 10 mL (has no administration in time range)   melatonin tablet 6 mg (has no administration in time range)   ondansetron disintegrating tablet 8 mg (has no administration in time range)   ondansetron injection 4 mg (has no administration in time range)   polyethylene glycol packet 17 g (has no administration in time range)   acetaminophen tablet 650 mg (has no administration in time range)   acetaminophen tablet 1,000 mg (has no administration in time range)   naloxone 0.4 mg/mL injection 0.02 mg (has no administration in time range)   glucose chewable tablet 16 g (has no administration in time range)   glucose chewable tablet 24 g (has no administration in time range)   glucagon (human recombinant) injection 1 mg (has no administration in time range)   lactated ringers infusion (has no administration in time range)   enoxaparin injection 30 mg (has no administration in time range)   lactated ringers bolus 1,000 mL (0 mLs Intravenous Stopped 8/4/23 1442)        ED Course as of 08/04/23 1708   Fri Aug 04, 2023   1449 SpO2: 99 % [SS]   1449 Resp: 18 [SS]   1449 Pulse:  66 [SS]   1449 Temp Source: Oral [SS]   1449 Temp: 98.7 °F (37.1 °C) [SS]   1449 BP(!): 93/54  Slightly hypotensive, will give IVF and obtain labs.  [SS]   1450 CBC Without Differential(!)  Unremarkable  [SS]   1450 Lactic Acid, Plasma  WNL [SS]   1450 Comprehensive metabolic panel(!)  Concerning for acute renal failure, baseline Cr from 2020 was 1.0.  [SS]   1450 Lipase  WNL [SS]   1450 POCT glucose(!)  Mildly elevated [SS]   1450 Urinalysis, Reflex to Urine Culture Urine, Clean Catch(!) [SS]   1450 Urinalysis Microscopic(!)  Inconsistent with UTI [SS]   1450 BP(!): 100/58  BP improving with IVF. [SS]   1706 CT Abdomen Pelvis  Without Contrast  CT A/P independently interpreted: no high-grade bowel obstruction or free air.  Agree with radiologist interpretation.    [SS]   1706 CT Head Without Contrast  CT head independently interpreted: no intracranial hemorrhage, mass effect, or midline shift.  Agree with radiologist interpretation.    [SS]   1706 X-Ray Chest AP Portable  CXR independently interpreted: no focal infiltrate, effusion, edema, free air, or other acute process  Agree with radiologist interpretation.    [SS]      ED Course User Index  [SS] Cabrera Gillespie MD       Medical Decision Making:   Initial Assessment:   62 yo M with multiple complaints including dizziness, weakness, back pain, vision changes for the last 2 years.  Will obtain CT head, labs, EKG, and reassess.   Independently Interpreted Test(s):   I have ordered and independently interpreted X-rays - see prior notes.  I have ordered and independently interpreted EKG Reading(s) - see prior notes  Clinical Tests:   Lab Tests: Ordered and Reviewed  Radiological Study: Reviewed and Ordered  Medical Tests: Ordered and Reviewed    On re-evaluation, the patient's status has remained stable.  At this time, I believe the patient should be admitted to the hospital for further evaluation and management of acute renal failure.  [facility] service was  contacted and the case was discussed.   The consulting physician/team agrees with plan and will admit under their service.   The patient and family were updated with test results, overall impression, and further plan of care. All questions were answered. The patient expressed understanding and agrees with the current plan.                   Clinical Impression:       ICD-10-CM ICD-9-CM   1. Acute renal failure, unspecified acute renal failure type  N17.9 584.9   2. Dizziness  R42 780.4   3. Dehydration  E86.0 276.51   4. Hypotension, unspecified hypotension type  I95.9 458.9   5. Weakness  R53.1 780.79         Follow-up Information    None          ED Disposition Condition    Admit Stable               Cabrera Gillespie MD  08/04/23 1471

## 2023-08-04 NOTE — ASSESSMENT & PLAN NOTE
-initially presented with hypotension which has improved following IV fluids in the ED  -continue to monitor closely

## 2023-08-04 NOTE — ED TRIAGE NOTES
Patient reports abdominal, dizziness, nausea, and vomiting for 2 years intermittently. He reports dizziness continuous for 1 week. He reports vomiting yesterday but not today. Patient reports history of colon cancer which resulted in surgery 4 years ago.

## 2023-08-04 NOTE — NURSING
Pt admitted to floor. VSS. No c/o pain n/v or sob.    25 yo female with no PMH presents to ED with vaginal bleeding and cramping x1 day. Patient reports one missed period (she was supposed to have a period on 5/12/19), period never came, patient began spotting on June 2, she took 3 home pregnancy tests and two came back positive and one negative (today in ED her urine test was negative). Today pt began with abdominal cramping and vaginal bleeding (one pad, one tampon) so she came to ED. No n/v/d, no fever

## 2023-08-04 NOTE — ASSESSMENT & PLAN NOTE
Patient with acute kidney injury/acute renal failure likely due to acute tubular necrosis caused by volume loss MARCELA is currently worsening. Baseline creatinine 1.0 - Labs reviewed- Renal function/electrolytes with Estimated Creatinine Clearance: 13.8 mL/min (A) (based on SCr of 5.9 mg/dL (H)). according to latest data. Monitor urine output and serial BMP and adjust therapy as needed. Avoid nephrotoxins and renally dose meds for GFR listed above.-reviewed outside hospital records and had creatinine 1.0 in May of this year which is most recent creatinine

## 2023-08-04 NOTE — CONSULTS
NEPHROLOGY CONSULT NOTE    HPI & INTERVAL HISTORY:    Past Medical History:   Diagnosis Date    High cholesterol     HIV disease     Hypertension     Prostate disorder       No past surgical history on file.   Review of patient's allergies indicates:  No Known Allergies   (Not in a hospital admission)      Social History     Socioeconomic History    Marital status:    Tobacco Use    Smoking status: Former     Current packs/day: 0.00    Smokeless tobacco: Never   Substance and Sexual Activity    Alcohol use: No    Drug use: No     Comment: used in the past        MEDS   [START ON 8/5/2023] atorvastatin  10 mg Oral Daily    [START ON 8/5/2023] pditlxlvq-rjldmfss-kyfuyfu ala  1 tablet Oral Daily    enoxparin  30 mg Subcutaneous Q24H (prophylaxis, 1700)    [START ON 8/5/2023] polyethylene glycol  17 g Oral Daily    sodium chloride 0.9%  10 mL Intravenous Q8H    [START ON 8/5/2023] tamsulosin  0.4 mg Oral Daily               CONTINOUS INFUSIONS:      Intake/Output Summary (Last 24 hours) at 8/4/2023 1703  Last data filed at 8/4/2023 1442  Gross per 24 hour   Intake 999 ml   Output --   Net 999 ml        HEMODYNAMICS:    Temp:  [98.7 °F (37.1 °C)] 98.7 °F (37.1 °C)  Pulse:  [55-66] 55  Resp:  [18-32] 21  SpO2:  [95 %-99 %] 96 %  BP: ()/(54-64) 112/64   General :   Weakness, dizziness,  blurred vision,  Light - headedness, back pain  Reports vomiting  No fever, no chills, no CP, no SOB  Weight loss approximately 20 lbs since December ( on Ozempic)  Cardiology : pulse 55  Pulmonary : diminished breath sounds  Abdomen : soft  Extremities : no edema    LABS   Lab Results   Component Value Date    WBC 10.97 08/04/2023    HGB 16.5 08/04/2023    HCT 47.0 08/04/2023    MCV 89 08/04/2023     08/04/2023        Recent Labs   Lab 08/04/23  1256   *   CALCIUM 8.9   ALBUMIN 4.6   PROT 7.8      K 4.2   CO2 23   CL 96   BUN 62*   CREATININE 5.9*   ALKPHOS 68   ALT 19   AST 11   BILITOT 1.0      Lab  "Results   Component Value Date    CALCIUM 8.9 08/04/2023      No results found for: "IRON", "TIBC", "FERRITIN"     ABG  No results for input(s): "PH", "PO2", "PCO2", "HCO3", "BE" in the last 168 hours.      IMAGING:  CXR    ASSESSMENT / PLAN  MARCELA/CKD 3 ?  History HIV,  Hypertension, Diabetes melitus  UA protein 1+, blood negative  CT  The kidneys have a grossly unremarkable noncontrast appearance without hydronephrosis or nephrolithiasis.  The bilateral ureters are unremarkable without calculi seen.  The urinary bladder is unremarkable.  The prostate is not enlarged.    No findings to suggest obstructive uropathy.  Creatinine 1 - 2020  Creatinine 5.9 today  GFR 10 cc/min  Azotemia  BUN 62  K 4.2  Metabolic bone disease  Hb 16.5  Albumin 4.6  CXR  Interstitial findings are accentuated by habitus, no large focal consolidation.  Interstitial edema is a consideration  Blood pressure 93/54, 112/64  Weight daily  I and O  Avoid nephrotoxic agents, hypotension, hypovolemia  Avoid NSAIDs  Hold ozempic  Hold lisinopril  IVF  "

## 2023-08-05 PROBLEM — R11.2 NAUSEA AND VOMITING: Status: RESOLVED | Noted: 2023-08-04 | Resolved: 2023-08-05

## 2023-08-05 LAB
ALBUMIN/CREAT UR: 6.3 UG/MG (ref 0–30)
ANION GAP SERPL CALC-SCNC: 11 MMOL/L (ref 8–16)
BUN SERPL-MCNC: 49 MG/DL (ref 8–23)
CALCIUM SERPL-MCNC: 8.8 MG/DL (ref 8.7–10.5)
CHLORIDE SERPL-SCNC: 103 MMOL/L (ref 95–110)
CO2 SERPL-SCNC: 26 MMOL/L (ref 23–29)
CREAT SERPL-MCNC: 2.6 MG/DL (ref 0.5–1.4)
CREAT UR-MCNC: 206 MG/DL (ref 23–375)
CREAT UR-MCNC: 206 MG/DL (ref 23–375)
EOSINOPHIL URNS QL WRIGHT STN: NORMAL
EST. GFR  (NO RACE VARIABLE): 27 ML/MIN/1.73 M^2
GLUCOSE SERPL-MCNC: 97 MG/DL (ref 70–110)
MAGNESIUM SERPL-MCNC: 2.3 MG/DL (ref 1.6–2.6)
MICROALBUMIN UR DL<=1MG/L-MCNC: 13 UG/ML
PHOSPHATE SERPL-MCNC: 3.3 MG/DL (ref 2.7–4.5)
POTASSIUM SERPL-SCNC: 4.3 MMOL/L (ref 3.5–5.1)
PROT UR-MCNC: 8 MG/DL (ref 0–15)
PROT/CREAT UR: 0.04 MG/G{CREAT} (ref 0–0.2)
SODIUM SERPL-SCNC: 140 MMOL/L (ref 136–145)
SODIUM UR-SCNC: 36 MMOL/L (ref 20–250)

## 2023-08-05 PROCEDURE — 83735 ASSAY OF MAGNESIUM: CPT | Performed by: HOSPITALIST

## 2023-08-05 PROCEDURE — 84300 ASSAY OF URINE SODIUM: CPT | Performed by: INTERNAL MEDICINE

## 2023-08-05 PROCEDURE — 63600175 PHARM REV CODE 636 W HCPCS: Performed by: INTERNAL MEDICINE

## 2023-08-05 PROCEDURE — 84156 ASSAY OF PROTEIN URINE: CPT | Performed by: INTERNAL MEDICINE

## 2023-08-05 PROCEDURE — A4216 STERILE WATER/SALINE, 10 ML: HCPCS | Performed by: HOSPITALIST

## 2023-08-05 PROCEDURE — 63600175 PHARM REV CODE 636 W HCPCS: Performed by: HOSPITALIST

## 2023-08-05 PROCEDURE — 84100 ASSAY OF PHOSPHORUS: CPT | Performed by: HOSPITALIST

## 2023-08-05 PROCEDURE — 82570 ASSAY OF URINE CREATININE: CPT | Performed by: INTERNAL MEDICINE

## 2023-08-05 PROCEDURE — 99900035 HC TECH TIME PER 15 MIN (STAT)

## 2023-08-05 PROCEDURE — 36415 COLL VENOUS BLD VENIPUNCTURE: CPT | Performed by: HOSPITALIST

## 2023-08-05 PROCEDURE — 94761 N-INVAS EAR/PLS OXIMETRY MLT: CPT

## 2023-08-05 PROCEDURE — 25000003 PHARM REV CODE 250: Performed by: HOSPITALIST

## 2023-08-05 PROCEDURE — 11000001 HC ACUTE MED/SURG PRIVATE ROOM

## 2023-08-05 PROCEDURE — 87205 SMEAR GRAM STAIN: CPT | Performed by: INTERNAL MEDICINE

## 2023-08-05 PROCEDURE — 80048 BASIC METABOLIC PNL TOTAL CA: CPT | Performed by: HOSPITALIST

## 2023-08-05 RX ORDER — LISINOPRIL 2.5 MG/1
2.5 TABLET ORAL DAILY
Status: DISCONTINUED | OUTPATIENT
Start: 2023-08-06 | End: 2023-08-06 | Stop reason: HOSPADM

## 2023-08-05 RX ORDER — SODIUM CHLORIDE, SODIUM LACTATE, POTASSIUM CHLORIDE, CALCIUM CHLORIDE 600; 310; 30; 20 MG/100ML; MG/100ML; MG/100ML; MG/100ML
INJECTION, SOLUTION INTRAVENOUS CONTINUOUS
Status: DISCONTINUED | OUTPATIENT
Start: 2023-08-05 | End: 2023-08-05

## 2023-08-05 RX ORDER — SODIUM CHLORIDE, SODIUM LACTATE, POTASSIUM CHLORIDE, CALCIUM CHLORIDE 600; 310; 30; 20 MG/100ML; MG/100ML; MG/100ML; MG/100ML
INJECTION, SOLUTION INTRAVENOUS CONTINUOUS
Status: ACTIVE | OUTPATIENT
Start: 2023-08-05 | End: 2023-08-06

## 2023-08-05 RX ADMIN — TAMSULOSIN HYDROCHLORIDE 0.4 MG: 0.4 CAPSULE ORAL at 10:08

## 2023-08-05 RX ADMIN — POLYETHYLENE GLYCOL 3350 17 G: 17 POWDER, FOR SOLUTION ORAL at 10:08

## 2023-08-05 RX ADMIN — Medication 6 MG: at 08:08

## 2023-08-05 RX ADMIN — HEPARIN SODIUM 5000 UNITS: 5000 INJECTION INTRAVENOUS; SUBCUTANEOUS at 10:08

## 2023-08-05 RX ADMIN — SODIUM CHLORIDE, POTASSIUM CHLORIDE, SODIUM LACTATE AND CALCIUM CHLORIDE: 600; 310; 30; 20 INJECTION, SOLUTION INTRAVENOUS at 04:08

## 2023-08-05 RX ADMIN — Medication 10 ML: at 05:08

## 2023-08-05 RX ADMIN — Medication 10 ML: at 08:08

## 2023-08-05 RX ADMIN — ATORVASTATIN CALCIUM 10 MG: 10 TABLET, FILM COATED ORAL at 10:08

## 2023-08-05 RX ADMIN — HEPARIN SODIUM 5000 UNITS: 5000 INJECTION INTRAVENOUS; SUBCUTANEOUS at 08:08

## 2023-08-05 RX ADMIN — Medication 10 ML: at 04:08

## 2023-08-05 NOTE — PROGRESS NOTES
[facility] Medicine  Progress Note    Patient Name: Apollo Nguyen  MRN: [id number]  Patient Class: IP- Inpatient   Admission Date: 8/4/2023  Length of Stay: 1 days  Attending Physician: Adelfo Benito,*  Primary Care Provider: Emma, Primary Doctor        Subjective:     Principal Problem:Acute renal failure with tubular necrosis        HPI:  Mr. Nguyen is a 63-year-old man with HIV, hypertension, and hyperlipidemia who presents with 3 years of intermittent dizziness and blurry vision which has recently been worsening.  States that yesterday he had episode nausea and vomiting in the morning, followed by large amount of emesis at around 1:00 p.m. that day.  States that he was able to eat a little bit that night, but again this morning he felt nausea and had some emesis again.  Denies any diarrhea.  Reports some abdominal discomfort but otherwise no pain.  No headache, fevers, chills, chest pain, or shortness of breath.  Reports he is compliant with all of his medications, including Biktarvy for his HIV.  No recent medicine changes other than addition of Ozempic to his regimen in December.  Of any over-the-counter medications.  Currently, he is not having any dizziness or vision changes.  Denies headache.  Seen with virtual .      Overview/Hospital Course:  No notes on file    Interval History: no further visual issues; did report mild dizziness when ambulating to the bathroom.  Renal function improving.  Seen with virtual .    Review of Systems   Neurological:  Positive for dizziness.     Objective:     Vital Signs (Most Recent):  Temp: 97.8 °F (36.6 °C) (08/05/23 1114)  Pulse: (!) 55 (08/05/23 1114)  Resp: 18 (08/05/23 1114)  BP: 136/73 (08/05/23 1114)  SpO2: 95 % (08/05/23 1114) Vital Signs (24h Range):  Temp:  [97.8 °F (36.6 °C)-98.8 °F (37.1 °C)] 97.8 °F (36.6 °C)  Pulse:  [54-75] 55  Resp:  [14-32] 18  SpO2:  [95 %-99 %] 95 %  BP:  ()/(48-97) 136/73     Weight: 92.9 kg (204 lb 12.9 oz)  Body mass index is 34.08 kg/m².    Intake/Output Summary (Last 24 hours) at 8/5/2023 1159  Last data filed at 8/5/2023 0704  Gross per 24 hour   Intake 2481.18 ml   Output 251 ml   Net 2230.18 ml         Physical Exam  Vitals reviewed.   Constitutional:       General: He is not in acute distress.     Appearance: He is well-developed. He is not diaphoretic.   HENT:      Head: Normocephalic and atraumatic.      Nose: Nose normal.   Eyes:      General: No scleral icterus.     Pupils: Pupils are equal, round, and reactive to light.   Neck:      Vascular: No JVD.      Trachea: No tracheal deviation.   Cardiovascular:      Rate and Rhythm: Normal rate and regular rhythm.      Heart sounds: Normal heart sounds. No murmur heard.     No friction rub. No gallop.   Pulmonary:      Effort: Pulmonary effort is normal. No respiratory distress.      Breath sounds: Normal breath sounds.   Abdominal:      General: There is no distension.      Palpations: Abdomen is soft. There is no mass.      Tenderness: There is no abdominal tenderness.      Hernia: No hernia is present.   Musculoskeletal:         General: No deformity.      Cervical back: Normal range of motion.   Skin:     General: Skin is warm and dry.      Findings: No rash.   Neurological:      Mental Status: He is alert and oriented to person, place, and time.   Psychiatric:         Behavior: Behavior normal.             Significant Labs: All pertinent labs within the past 24 hours have been reviewed.    Significant Imaging: I have reviewed all pertinent imaging results/findings within the past 24 hours.      Assessment/Plan:      * Acute renal failure with tubular necrosis  Patient with acute kidney injury/acute renal failure likely due to acute tubular necrosis caused by volume loss MARCELA is currently worsening. Baseline creatinine 1.0 - Labs reviewed- Renal function/electrolytes with Estimated Creatinine  Clearance: 31.3 mL/min (A) (based on SCr of 2.6 mg/dL (H)). according to latest data. Monitor urine output and serial BMP and adjust therapy as needed. Avoid nephrotoxins and renally dose meds for GFR listed above.-reviewed outside hospital records and had creatinine 1.0 in May of this year which is most recent creatinine  -improving with IV fluid  -continue hydration  -may benefit from holding Ozempic    Class 1 obesity in adult  Body mass index is 34.08 kg/m². Morbid obesity complicates all aspects of disease management from diagnostic modalities to treatment. Weight loss encouraged and health benefits explained to patient.         Hypotension  -initially presented with hypotension which has improved following IV fluids in the ED  -continue to monitor closely      Hypertension  -on lisinopril at home; will hold now   -hypotensive on arrival      HIV disease  This patient in known to have HIV but do not have recorded CD4 count or viral load; will check. Patient is on HAART. Will continue HAART. Prophylaxis was not indicated at this time. Continue to monitor routine labs.   We will not consult Infectious disease at this time. Other HIV-associated diseases are not present.        PATRIC (obstructive sleep apnea)  -CPAP      VTE Risk Mitigation (From admission, onward)         Ordered     heparin (porcine) injection 5,000 Units  Every 12 hours         08/04/23 1737     IP VTE HIGH RISK PATIENT  Once         08/04/23 1737     Place sequential compression device  Until discontinued         08/04/23 1649                Discharge Planning   ANTHONY:      Code Status: Full Code   Is the patient medically ready for discharge?:     Reason for patient still in hospital (select all that apply): Treatment and Consult recommendations                     Adelfo Benito MD  Department of Hospital Medicine   [facility] S[facility]

## 2023-08-05 NOTE — PLAN OF CARE
Patient is AAOx4. No complaints of pain or n/v. LR 100mL/hr. CPAP worn throughout night. Tele monitored. Medications administered per MAR. Instructed to call for assistance.

## 2023-08-05 NOTE — NURSING
RAPID RESPONSE NURSE PROACTIVE ROUNDING NOTE       Time of Visit: 5    Admit Date: 2023  LOS: 0  Code Status: Full Code   Date of Visit: 2023  : 1960  Age: 63 y.o.  Sex: male  Race: White  Bed: K508/K508 A:   MRN: [id number]  Was the patient discharged from an ICU this admission? No   Was the patient discharged from a PACU within last 24 hours? No   Did the patient receive conscious sedation/general anesthesia in last 24 hours? No   Was the patient in the ED within the past 24 hours? Yes   Was the patient on NIPPV within the past 24 hours? No   Attending Physician: Adelfo Benito,*  Primary Service: Hospitalist   Time spent at the bedside: < 15 min    SITUATION    Notified by Epic patient alert  Reason for alert: hypotensive    Diagnosis: Acute renal failure with tubular necrosis   has a past medical history of High cholesterol, HIV disease, Hypertension, and Prostate disorder.    Last Vitals:  Temp: 97.9 °F (36.6 °C) (2305)  Pulse: 54 (2305)  Resp: 17 (2305)  BP: 91/48 (2305)  SpO2: 97 % (2305)    24 Hour Vitals Range:  Temp:  [97.9 °F (36.6 °C)-98.7 °F (37.1 °C)]   Pulse:  [54-67]   Resp:  [14-32]   BP: ()/(48-97)   SpO2:  [95 %-99 %]     Clinical Issues: Circulatory    ASSESSMENT/INTERVENTIONS    Pt lying in bed with eyes closed. Appears to be resting comfortably. When aroused pt is alert and oriented x4. Denies SOB, chest pain, or generalized discomfort. No complaints of N/V/D, or dizziness at this time. Vitals reassessed: /52, Sinus Abner on tele monitor, HR 56.    Chart, labs, vitals, and orders reviewed.     RECOMMENDATIONS    Discussed plan of care with bedside RNKaterine    PROVIDER ESCALATION    Physician escalation: No    Orders received and case discussed with NA.    Disposition:Remain in room 456    FOLLOW UP    Call back the Rapid Response Nellie Torres at [phone] for additional questions or concerns.             27-Jul-2023 08:04

## 2023-08-05 NOTE — ASSESSMENT & PLAN NOTE
This patient in known to have HIV but do not have recorded CD4 count or viral load; will check. Patient is on HAART. Will continue HAART. Prophylaxis was not indicated at this time. Continue to monitor routine labs.   We will not consult Infectious disease at this time. Other HIV-associated diseases are not present.

## 2023-08-05 NOTE — SUBJECTIVE & OBJECTIVE
Interval History: no further visual issues; did report mild dizziness when ambulating to the bathroom.  Renal function improving.  Seen with virtual .    Review of Systems   Neurological:  Positive for dizziness.     Objective:     Vital Signs (Most Recent):  Temp: 97.8 °F (36.6 °C) (08/05/23 1114)  Pulse: (!) 55 (08/05/23 1114)  Resp: 18 (08/05/23 1114)  BP: 136/73 (08/05/23 1114)  SpO2: 95 % (08/05/23 1114) Vital Signs (24h Range):  Temp:  [97.8 °F (36.6 °C)-98.8 °F (37.1 °C)] 97.8 °F (36.6 °C)  Pulse:  [54-75] 55  Resp:  [14-32] 18  SpO2:  [95 %-99 %] 95 %  BP: ()/(48-97) 136/73     Weight: 92.9 kg (204 lb 12.9 oz)  Body mass index is 34.08 kg/m².    Intake/Output Summary (Last 24 hours) at 8/5/2023 1159  Last data filed at 8/5/2023 0704  Gross per 24 hour   Intake 2481.18 ml   Output 251 ml   Net 2230.18 ml         Physical Exam  Vitals reviewed.   Constitutional:       General: He is not in acute distress.     Appearance: He is well-developed. He is not diaphoretic.   HENT:      Head: Normocephalic and atraumatic.      Nose: Nose normal.   Eyes:      General: No scleral icterus.     Pupils: Pupils are equal, round, and reactive to light.   Neck:      Vascular: No JVD.      Trachea: No tracheal deviation.   Cardiovascular:      Rate and Rhythm: Normal rate and regular rhythm.      Heart sounds: Normal heart sounds. No murmur heard.     No friction rub. No gallop.   Pulmonary:      Effort: Pulmonary effort is normal. No respiratory distress.      Breath sounds: Normal breath sounds.   Abdominal:      General: There is no distension.      Palpations: Abdomen is soft. There is no mass.      Tenderness: There is no abdominal tenderness.      Hernia: No hernia is present.   Musculoskeletal:         General: No deformity.      Cervical back: Normal range of motion.   Skin:     General: Skin is warm and dry.      Findings: No rash.   Neurological:      Mental Status: He is alert and oriented to  person, place, and time.   Psychiatric:         Behavior: Behavior normal.             Significant Labs: All pertinent labs within the past 24 hours have been reviewed.    Significant Imaging: I have reviewed all pertinent imaging results/findings within the past 24 hours.

## 2023-08-05 NOTE — ASSESSMENT & PLAN NOTE
Patient with acute kidney injury/acute renal failure likely due to acute tubular necrosis caused by volume loss MARCELA is currently worsening. Baseline creatinine 1.0 - Labs reviewed- Renal function/electrolytes with Estimated Creatinine Clearance: 31.3 mL/min (A) (based on SCr of 2.6 mg/dL (H)). according to latest data. Monitor urine output and serial BMP and adjust therapy as needed. Avoid nephrotoxins and renally dose meds for GFR listed above.-reviewed outside hospital records and had creatinine 1.0 in May of this year which is most recent creatinine  -improving with IV fluid  -continue hydration  -may benefit from holding Ozempic

## 2023-08-05 NOTE — NURSING
AAOx4. No c/o pain, n/v. Tolerates diet well w/ hefty appetite. LR infusing. Meds admin per MAR. Safety m/t.

## 2023-08-05 NOTE — PLAN OF CARE
08/04/23 1938   Admission   Initial VN Admission Questions Complete   Communication Issues? None   Shift   Virtual Nurse - Rounding Complete   Virtual Nurse - Patient Verbalized Approval Of Camera Use;VN Rounding   Type of Frequent Check   Type Patient Rounds;Telemetry Monitoring   Safety/Activity   Patient Rounds bed in low position;bed wheels locked;call light in patient/parent reach;clutter free environment maintained;ID band on;visualized patient;placement of personal items at bedside   Safety Promotion/Fall Prevention assistive device/personal item within reach;Fall Risk reviewed with patient/family;instructed to call staff for mobility;side rails raised x 2   Activity Management Sitting at edge of bed - L2   Pain/Comfort/Sleep   Preferred Pain Scale number (Numeric Rating Pain Scale)   Comfort/Acceptable Pain Level 2   Pain Rating (0-10): Rest 0   Cardiac   Cardiac/Telemetry Monitor On Yes   ECG   Rhythm sinus bradycardia     Admission questions completed via use of remote , ID #[id number]. Introduced patient to VIP model, patient verbalized understanding. Educated patient on fall prevention protocol, updated plan of care. Opportunity given for pt's questions. All questions answered. Denies needs at this time.

## 2023-08-05 NOTE — PROGRESS NOTES
Progress Note  Nephrology      Consult Requested By: Adlefo Benito,*      SUBJECTIVE:     Overnight events  Patient is a 63 y.o. male     Patient Active Problem List   Diagnosis    PATRIC (obstructive sleep apnea)    HIV disease    Acute renal failure with tubular necrosis    Hypertension    Hypotension    Class 1 obesity in adult     Past Medical History:   Diagnosis Date    High cholesterol     HIV disease     Hypertension     Prostate disorder               OBJECTIVE:     Vitals:    08/05/23 0452 08/05/23 0730 08/05/23 0821 08/05/23 1114   BP:  (!) 106/56  136/73   BP Location:  Right arm  Right arm   Patient Position:  Lying  Sitting   Pulse: (!) 58 (!) 55 75 (!) 55   Resp:  18  18   Temp:  97.9 °F (36.6 °C)  97.8 °F (36.6 °C)   TempSrc:  Oral  Oral   SpO2:  95% 96% 95%   Weight:       Height:           Temp: 97.8 °F (36.6 °C) (08/05/23 1114)  Pulse: (!) 55 (08/05/23 1114)  Resp: 18 (08/05/23 1114)  BP: 136/73 (08/05/23 1114)  SpO2: 95 % (08/05/23 1114)    Date 08/05/23 0700 - 08/06/23 0659   Shift 0148-8705 5924-6201 9029-7718 24 Hour Total   INTAKE   P.O. 120   120   Shift Total(mL/kg) 120(1.3)   120(1.3)   OUTPUT   Urine(mL/kg/hr) 400   400   Shift Total(mL/kg) 400(4.3)   400(4.3)   Weight (kg) 92.9 92.9 92.9 92.9             Medications:   atorvastatin  10 mg Oral Daily    txzvjrhdk-egaidqdf-lzpjsoq ala  1 tablet Oral Daily    heparin (porcine)  5,000 Units Subcutaneous Q12H    polyethylene glycol  17 g Oral Daily    sodium chloride 0.9%  10 mL Intravenous Q8H    tamsulosin  0.4 mg Oral Daily      lactated ringers Stopped (08/05/23 0457)               Physical Exam:  General appearance:  Feeling better  No nausea  No vomiting  No diarrhea  No weakness  Lungs: diminished breath sounds   Heart: Pulse 55  Abdomen: soft  Extremities: no edema      Laboratory:  ABG  Labs reviewed  Recent Results (from the past 336 hour(s))   Basic Metabolic Panel (BMP)    Collection Time: 08/05/23  5:38 AM   Result Value  "Ref Range    Sodium 140 136 - 145 mmol/L    Potassium 4.3 3.5 - 5.1 mmol/L    Chloride 103 95 - 110 mmol/L    CO2 26 23 - 29 mmol/L    BUN 49 (H) 8 - 23 mg/dL    Creatinine 2.6 (H) 0.5 - 1.4 mg/dL    Calcium 8.8 8.7 - 10.5 mg/dL    Anion Gap 11 8 - 16 mmol/L     No results found for this or any previous visit (from the past 336 hour(s)).  Urinalysis  No results for input(s): "COLORU", "CLARITYU", "SPECGRAV", "PHUR", "PROTEINUA", "GLUCOSEU", "BILIRUBINCON", "BLOODU", "WBCU", "RBCU", "BACTERIA", "MUCUS", "NITRITE", "LEUKOCYTESUR", "UROBILINOGEN", "HYALINECASTS" in the last 24 hours.    Diagnostic Results:  X-Ray: Reviewed  US: Reviewed  Echo: Reviewed  ACCESS    ASSESSMENT/PLAN:   MARCELA/CKD 3?  History HIV, Hypertension, Diabetes mellitus  UA protein 1+, blood negative  CT  The kidneys have a grossly unremarkable noncontrast appearance without hydronephrosis or nephrolithiasis.  The bilateral ureters are unremarkable without calculi seen.  The urinary bladder is unremarkable.  The prostate is not enlarged.    No findings to suggest obstructive uropathy.  Creatinine 1 - 2020  Creatinine 5.9 - 2.6  GFR 10 - 27 cc/min  Azotemia  BUN 62 - 49  K 4.2 - 4.3  Metabolic bone disease  Hb 16.5  Albumin 4.6  CXR  Interstitial findings are accentuated by habitus, no large focal consolidation.  Interstitial edema is a consideration  Blood pressure 93/54, 112/64, 136/73  Weight daily  I and O  Avoid nephrotoxic agents, hypotension, hypovolemia  Avoid NSAIDs  Hold ozempic  Can restart lisinopril 2.5 mg and titrate dose up as BP tolerates.  Watch creatinine.  Encourage fluids intake.  "

## 2023-08-06 VITALS
HEART RATE: 58 BPM | HEIGHT: 65 IN | RESPIRATION RATE: 18 BRPM | TEMPERATURE: 98 F | OXYGEN SATURATION: 94 % | SYSTOLIC BLOOD PRESSURE: 132 MMHG | DIASTOLIC BLOOD PRESSURE: 71 MMHG | BODY MASS INDEX: 35.33 KG/M2 | WEIGHT: 212.06 LBS

## 2023-08-06 LAB
ANION GAP SERPL CALC-SCNC: 6 MMOL/L (ref 8–16)
BUN SERPL-MCNC: 24 MG/DL (ref 8–23)
CALCIUM SERPL-MCNC: 8.6 MG/DL (ref 8.7–10.5)
CHLORIDE SERPL-SCNC: 106 MMOL/L (ref 95–110)
CO2 SERPL-SCNC: 26 MMOL/L (ref 23–29)
CREAT SERPL-MCNC: 1.4 MG/DL (ref 0.5–1.4)
EST. GFR  (NO RACE VARIABLE): 57 ML/MIN/1.73 M^2
GLUCOSE SERPL-MCNC: 103 MG/DL (ref 70–110)
MAGNESIUM SERPL-MCNC: 1.8 MG/DL (ref 1.6–2.6)
PHOSPHATE SERPL-MCNC: 2.3 MG/DL (ref 2.7–4.5)
POTASSIUM SERPL-SCNC: 4.6 MMOL/L (ref 3.5–5.1)
SODIUM SERPL-SCNC: 138 MMOL/L (ref 136–145)

## 2023-08-06 PROCEDURE — 99900035 HC TECH TIME PER 15 MIN (STAT)

## 2023-08-06 PROCEDURE — 80048 BASIC METABOLIC PNL TOTAL CA: CPT | Performed by: HOSPITALIST

## 2023-08-06 PROCEDURE — 63600175 PHARM REV CODE 636 W HCPCS: Performed by: HOSPITALIST

## 2023-08-06 PROCEDURE — 63600175 PHARM REV CODE 636 W HCPCS: Performed by: INTERNAL MEDICINE

## 2023-08-06 PROCEDURE — A4216 STERILE WATER/SALINE, 10 ML: HCPCS | Performed by: HOSPITALIST

## 2023-08-06 PROCEDURE — 25000003 PHARM REV CODE 250: Performed by: INTERNAL MEDICINE

## 2023-08-06 PROCEDURE — 83735 ASSAY OF MAGNESIUM: CPT | Performed by: HOSPITALIST

## 2023-08-06 PROCEDURE — 25000003 PHARM REV CODE 250: Performed by: HOSPITALIST

## 2023-08-06 PROCEDURE — 84100 ASSAY OF PHOSPHORUS: CPT | Performed by: HOSPITALIST

## 2023-08-06 PROCEDURE — 36415 COLL VENOUS BLD VENIPUNCTURE: CPT | Performed by: HOSPITALIST

## 2023-08-06 PROCEDURE — 51798 US URINE CAPACITY MEASURE: CPT

## 2023-08-06 RX ORDER — LISINOPRIL 20 MG/1
10 TABLET ORAL DAILY
Start: 2023-08-06 | End: 2023-08-25 | Stop reason: ALTCHOICE

## 2023-08-06 RX ADMIN — Medication 10 ML: at 09:08

## 2023-08-06 RX ADMIN — LISINOPRIL 2.5 MG: 2.5 TABLET ORAL at 09:08

## 2023-08-06 RX ADMIN — TAMSULOSIN HYDROCHLORIDE 0.4 MG: 0.4 CAPSULE ORAL at 09:08

## 2023-08-06 RX ADMIN — POLYETHYLENE GLYCOL 3350 17 G: 17 POWDER, FOR SOLUTION ORAL at 09:08

## 2023-08-06 RX ADMIN — SODIUM CHLORIDE, POTASSIUM CHLORIDE, SODIUM LACTATE AND CALCIUM CHLORIDE: 600; 310; 30; 20 INJECTION, SOLUTION INTRAVENOUS at 01:08

## 2023-08-06 RX ADMIN — ATORVASTATIN CALCIUM 10 MG: 10 TABLET, FILM COATED ORAL at 09:08

## 2023-08-06 RX ADMIN — HEPARIN SODIUM 5000 UNITS: 5000 INJECTION INTRAVENOUS; SUBCUTANEOUS at 09:08

## 2023-08-06 RX ADMIN — BICTEGRAVIR SODIUM, EMTRICITABINE, AND TENOFOVIR ALAFENAMIDE FUMARATE 1 TABLET: 50; 200; 25 TABLET ORAL at 09:08

## 2023-08-06 NOTE — PROGRESS NOTES
Progress Note  Nephrology      Consult Requested By: Adelfo Benito,*      SUBJECTIVE:     Overnight events  Patient is a 63 y.o. male     Patient Active Problem List   Diagnosis    PATRIC (obstructive sleep apnea)    HIV disease    Acute renal failure with tubular necrosis    Hypertension    Hypotension    Class 1 obesity in adult     Past Medical History:   Diagnosis Date    High cholesterol     HIV disease     Hypertension     Prostate disorder               OBJECTIVE:     Vitals:    08/06/23 0008 08/06/23 0355 08/06/23 0427 08/06/23 0735   BP: 119/60  (!) 122/56 135/63   BP Location:    Right arm   Patient Position: Lying  Lying Lying   Pulse: (!) 52 (!) 55 (!) 54 (!) 52   Resp: 20  20 18   Temp: 97.9 °F (36.6 °C)  98.2 °F (36.8 °C) 97.5 °F (36.4 °C)   TempSrc: Oral  Oral Oral   SpO2: 97%  95% 96%   Weight: 96.2 kg (212 lb 1.3 oz)      Height:           Temp: 97.5 °F (36.4 °C) (08/06/23 0735)  Pulse: (!) 52 (08/06/23 0735)  Resp: 18 (08/06/23 0735)  BP: 135/63 (08/06/23 0735)  SpO2: 96 % (08/06/23 0735)    Date 08/06/23 0700 - 08/07/23 0659   Shift 3902-4392 7547-9689 8431-5796 24 Hour Total   INTAKE   P.O. 120   120   Shift Total(mL/kg) 120(1.2)   120(1.2)   OUTPUT   Shift Total(mL/kg)       Weight (kg) 96.2 96.2 96.2 96.2             Medications:   atorvastatin  10 mg Oral Daily    chhgcxoij-zdmtdqlw-ioqxumo ala  1 tablet Oral Daily    heparin (porcine)  5,000 Units Subcutaneous Q12H    lisinopriL  2.5 mg Oral Daily    polyethylene glycol  17 g Oral Daily    sodium chloride 0.9%  10 mL Intravenous Q8H    tamsulosin  0.4 mg Oral Daily                   Physical Exam:  General appearance:  Feeling better  No nausea  No vomiting  No diarrhea  No weakness  Lungs: diminished breath sounds   Heart: Pulse 55  Abdomen: soft  Extremities: no edema      Laboratory:  ABG  Labs reviewed  Recent Results (from the past 336 hour(s))   Basic Metabolic Panel (BMP)    Collection Time: 08/06/23  5:34 AM   Result Value Ref  "Range    Sodium 138 136 - 145 mmol/L    Potassium 4.6 3.5 - 5.1 mmol/L    Chloride 106 95 - 110 mmol/L    CO2 26 23 - 29 mmol/L    BUN 24 (H) 8 - 23 mg/dL    Creatinine 1.4 0.5 - 1.4 mg/dL    Calcium 8.6 (L) 8.7 - 10.5 mg/dL    Anion Gap 6 (L) 8 - 16 mmol/L   Basic Metabolic Panel (BMP)    Collection Time: 08/05/23  5:38 AM   Result Value Ref Range    Sodium 140 136 - 145 mmol/L    Potassium 4.3 3.5 - 5.1 mmol/L    Chloride 103 95 - 110 mmol/L    CO2 26 23 - 29 mmol/L    BUN 49 (H) 8 - 23 mg/dL    Creatinine 2.6 (H) 0.5 - 1.4 mg/dL    Calcium 8.8 8.7 - 10.5 mg/dL    Anion Gap 11 8 - 16 mmol/L     No results found for this or any previous visit (from the past 336 hour(s)).  Urinalysis  No results for input(s): "COLORU", "CLARITYU", "SPECGRAV", "PHUR", "PROTEINUA", "GLUCOSEU", "BILIRUBINCON", "BLOODU", "WBCU", "RBCU", "BACTERIA", "MUCUS", "NITRITE", "LEUKOCYTESUR", "UROBILINOGEN", "HYALINECASTS" in the last 24 hours.    Diagnostic Results:  X-Ray: Reviewed  US: Reviewed  Echo: Reviewed  ACCESS    ASSESSMENT/PLAN:   MARCELA/CKD 3?  History HIV, Hypertension, Diabetes mellitus  UA protein 1+, blood negative  CT -  No findings to suggest obstructive uropathy.  Creatinine 1 - 2020  Creatinine 5.9 - 2.6 to 1.4 today   Avoid nephrotoxic agents, hypotension, hypovolemia  Avoid NSAIDs  Azotemia     Metabolic bone disease  Hb 16.5  Albumin 4.6        Blood pressure    Weight daily  I and O    Hold ozempic  -- restart lisinopril 2.5 mg and titrate dose up as BP tolerates.     Encourage fluids intake.    Thank you for the consult, will follow  With any question please call [phone]  Regina Alaniz MD    [facility]     TONE Shields MD,   MD PADMA Herr MD  E. V. Rivera, NP    [address]  [place], [address]  [phone]    "

## 2023-08-06 NOTE — HOSPITAL COURSE
"Mr. Nguyen presented with dizziness, vision changes, and substantial MARCELA in the setting recent Ozempic use.  Found to be hypotensive at time of admission; he was given fluid bolus and home antihypertensives were held with substantial improvement in his symptoms.  MARCELA has resolved as well.  Stable for discharge; I have reduced his home lisinopril for now, but suspect that he will be able to go back on his 20 mg dose if blood pressure is increasing as outpatient.  Will discontinue Ozempic for now; can discuss alternate options with primary care physician in outpatient setting.  Seen with virtual  and answered all questions.    /71 (BP Location: Left arm, Patient Position: Lying)   Pulse (!) 58   Temp 98.1 °F (36.7 °C) (Oral)   Resp 18   Ht 5' 5" (1.651 m)   Wt 96.2 kg (212 lb 1.3 oz)   SpO2 (!) 94%   BMI 35.29 kg/m²   Physical Exam  Vitals reviewed.   Constitutional:       General: He is not in acute distress.     Appearance: He is well-developed. He is not diaphoretic.   HENT:      Head: Normocephalic and atraumatic.      Nose: Nose normal.   Eyes:      General: No scleral icterus.     Pupils: Pupils are equal, round, and reactive to light.   Neck:      Vascular: No JVD.      Trachea: No tracheal deviation.   Cardiovascular:      Rate and Rhythm: Normal rate and regular rhythm.      Heart sounds: Normal heart sounds. No murmur heard.     No friction rub. No gallop.   Pulmonary:      Effort: Pulmonary effort is normal. No respiratory distress.      Breath sounds: Normal breath sounds.   Abdominal:      General: There is no distension.      Palpations: Abdomen is soft. There is no mass.      Tenderness: There is no abdominal tenderness.      Hernia: No hernia is present.   Musculoskeletal:         General: No deformity.      Cervical back: Normal range of motion.   Skin:     General: Skin is warm and dry.      Findings: No rash.   Neurological:      Mental Status: He is alert " and oriented to person, place, and time.   Psychiatric:         Behavior: Behavior normal.

## 2023-08-06 NOTE — PLAN OF CARE
SW met with pt via [facility] to discuss dc planning. Pt declined . Pt will dc with no needs noted. Pt reports his car is outside and he will transport himself home. Pt reports that he will schedule his own PCP follow up within 1-7 days. No additional cm needs.     Cleared from CM . Bedside Nurse and VN notified.     08/06/23 0802   Final Note   Assessment Type Final Discharge Note   Anticipated Discharge Disposition Home   Post-Acute Status   Discharge Delays None known at this time

## 2023-08-06 NOTE — DISCHARGE SUMMARY
[facility]  Discharge Summary      Patient Name: Apollo Nguyen  MRN: [id number]  CHANELLE: [id number]  Patient Class: IP- Inpatient  Admission Date: 8/4/2023  Hospital Length of Stay: 2 days  Discharge Date and Time:  08/06/2023 12:49 PM  Attending Physician: Adelfo Benito.,*   Discharging Provider: Adelfo Benito MD  Primary Care Provider: Emma, Primary Doctor    Primary Care Team: Networked reference to record PCT     HPI:   Mr. Nguyen is a 63-year-old man with HIV, hypertension, and hyperlipidemia who presents with 3 years of intermittent dizziness and blurry vision which has recently been worsening.  States that yesterday he had episode nausea and vomiting in the morning, followed by large amount of emesis at around 1:00 p.m. that day.  States that he was able to eat a little bit that night, but again this morning he felt nausea and had some emesis again.  Denies any diarrhea.  Reports some abdominal discomfort but otherwise no pain.  No headache, fevers, chills, chest pain, or shortness of breath.  Reports he is compliant with all of his medications, including Biktarvy for his HIV.  No recent medicine changes other than addition of Ozempic to his regimen in December.  Of any over-the-counter medications.  Currently, he is not having any dizziness or vision changes.  Denies headache.  Seen with virtual .      * No surgery found *      Hospital Course:   Mr. Nguyen presented with dizziness, vision changes, and substantial MARCELA in the setting recent Ozempic use.  Found to be hypotensive at time of admission; he was given fluid bolus and home antihypertensives were held with substantial improvement in his symptoms.  MARCELA has resolved as well.  Stable for discharge; I have reduced his home lisinopril for now, but suspect that he will be able to go back on his 20 mg dose if blood pressure is increasing as outpatient.  Will  "discontinue Ozempic for now; can discuss alternate options with primary care physician in outpatient setting.  Seen with virtual  and answered all questions.    /71 (BP Location: Left arm, Patient Position: Lying)   Pulse (!) 58   Temp 98.1 °F (36.7 °C) (Oral)   Resp 18   Ht 5' 5" (1.651 m)   Wt 96.2 kg (212 lb 1.3 oz)   SpO2 (!) 94%   BMI 35.29 kg/m²   Physical Exam  Vitals reviewed.   Constitutional:       General: He is not in acute distress.     Appearance: He is well-developed. He is not diaphoretic.   HENT:      Head: Normocephalic and atraumatic.      Nose: Nose normal.   Eyes:      General: No scleral icterus.     Pupils: Pupils are equal, round, and reactive to light.   Neck:      Vascular: No JVD.      Trachea: No tracheal deviation.   Cardiovascular:      Rate and Rhythm: Normal rate and regular rhythm.      Heart sounds: Normal heart sounds. No murmur heard.     No friction rub. No gallop.   Pulmonary:      Effort: Pulmonary effort is normal. No respiratory distress.      Breath sounds: Normal breath sounds.   Abdominal:      General: There is no distension.      Palpations: Abdomen is soft. There is no mass.      Tenderness: There is no abdominal tenderness.      Hernia: No hernia is present.   Musculoskeletal:         General: No deformity.      Cervical back: Normal range of motion.   Skin:     General: Skin is warm and dry.      Findings: No rash.   Neurological:      Mental Status: He is alert and oriented to person, place, and time.   Psychiatric:         Behavior: Behavior normal.        Goals of Care Treatment Preferences:  Code Status: Full Code      Consults:   Consults (From admission, onward)        Status Ordering Provider     Inpatient consult to [facility] (Alyson Castañeda Nimkevych)  Once        Provider:  (Not yet assigned)    TUAN Hanley          No new Assessment & Plan notes have been filed under this hospital " service since the last note was generated.  Service: Hospital Medicine    Final Active Diagnoses:    Diagnosis Date Noted POA    PRINCIPAL PROBLEM:  Acute renal failure with tubular necrosis [N17.0] 08/04/2023 Yes    HIV disease [B20] 08/04/2023 Yes    Hypertension [I10] 08/04/2023 Yes    Hypotension [I95.9] 08/04/2023 Yes    Class 1 obesity in adult [E66.9] 08/04/2023 Yes    PATRIC (obstructive sleep apnea) [G47.33]  Yes      Problems Resolved During this Admission:    Diagnosis Date Noted Date Resolved POA    Nausea and vomiting [R11.2] 08/04/2023 08/05/2023 Yes       Discharged Condition: good    Disposition: Home or Self Care    Follow Up:   Follow-up Information     PCP FOLLOW UP. Call.    Why: Please call and scehdule own PCP follow up within 1-7 days.                     Patient Instructions:      Ambulatory referral/consult to [facility]   Standing Status: Future   Referral Priority: Routine Referral Type: Consultation   Referral Reason: Specialty Services Required   Number of Visits Requested: 1     Diet Adult Regular     Notify your health care provider if you experience any of the following:  temperature >100.4     Notify your health care provider if you experience any of the following:  persistent nausea and vomiting or diarrhea     Notify your health care provider if you experience any of the following:  severe uncontrolled pain     Notify your health care provider if you experience any of the following:  difficulty breathing or increased cough     Notify your health care provider if you experience any of the following:  severe persistent headache     Notify your health care provider if you experience any of the following:  persistent dizziness, light-headedness, or visual disturbances     Notify your health care provider if you experience any of the following:  increased confusion or weakness     Activity as tolerated       Significant Diagnostic Studies: N/A    Pending Diagnostic Studies:      Procedure Component Value Units Date/Time    CD4 T-[place] Cells [[id number]] Collected: 08/04/23 1744    Order Status: Sent Lab Status: In process Updated: 08/05/23 0710    Specimen: Blood     HIV RNA, quantitative, PCR [[id number]] Collected: 08/04/23 1744    Order Status: Sent Lab Status: In process Updated: 08/04/23 2303    Specimen: Blood          Medications:  Reconciled Home Medications:      Medication List      CHANGE how you take these medications    lisinopriL 20 MG tablet  Commonly known as: PRINIVIL,ZESTRIL  Take 0.5 tablets (10 mg total) by mouth once daily.  What changed: how much to take        CONTINUE taking these medications    acetaminophen 500 MG tablet  Commonly known as: TYLENOL  Take 500 mg by mouth every 6 (six) hours as needed for Pain.     aspirin 81 MG EC tablet  Commonly known as: ECOTRIN  Take 81 mg by mouth once daily.     atorvastatin 10 MG tablet  Commonly known as: LIPITOR  Take 10 mg by mouth.     ojuwczpvc-zlgqtzuq-bmzjtnn ala -25 mg (25 kg or greater)  Commonly known as: BIKTARVY  Take 1 tablet by mouth once daily.     cholecalciferol (vitamin D3) 125 mcg (5,000 unit) Tab  Take 5,000 Units by mouth once daily.     loratadine 10 mg tablet  Commonly known as: CLARITIN  Take 10 mg by mouth daily as needed.     metFORMIN 500 MG ER 24hr tablet  Commonly known as: GLUCOPHAGE-XR  Take 500 mg by mouth every evening.     multivitamin per tablet  Commonly known as: THERAGRAN  Take 1 tablet by mouth once daily.     omega-3s-dha-epa-fish oil 900-110-680 mg Cap  Take 1 capsule by mouth once daily.     pantoprazole 20 MG tablet  Commonly known as: PROTONIX  Take 20 mg by mouth once daily.     tamsulosin 0.4 mg Cap  Commonly known as: FLOMAX  Take 0.4 mg by mouth once daily.     testosterone 1 % (50 mg/5 gram) Glpk  Commonly known as: ANDROGEL  Place 5 g onto the skin once daily.     VIAGRA 50 MG tablet  Generic drug: sildenafiL  TAKE 1 TO 2 TABLETS BY MOUTH EVERY 72 HOURS        STOP taking  these medications    OZEMPIC 2 mg/dose (8 mg/3 mL) Pnij  Generic drug: semaglutide            Indwelling Lines/Drains at time of discharge:   Lines/Drains/Airways     None                 Time spent on the discharge of patient: 35 minutes         Adelfo Benito MD  Department of Hospital Medicine  [facility]

## 2023-08-06 NOTE — PLAN OF CARE
Patient discharge instructions given and reviewed. Med rec reviewed with  Patient. Patient verbalized understanding.Education provided on new medication and diagnosis and follow-up appointment. PIV d/amanda tip intact. Pt tolerated well.  Pt to drive self home. Wheeled down to lobby via transplant.

## 2023-08-06 NOTE — NURSING
Assumed care of pt from NAPOLEON Gonzalez. Pt sitting up in bed with HOB at 60o. No report od pain at this time. Pt states he is hungry and would like a sandwich. Informed pt I would see what was available. Plan of care and safety protocols reviewed with pt. Bed in low/locked position with 2 side rails raised and call light within reach.

## 2023-08-07 LAB
CD3+CD4+ CELLS # BLD: 668 CELLS/UL (ref 300–1400)
CD3+CD4+ CELLS NFR BLD: 16.5 % (ref 28–57)
HIV1 RNA # SERPL NAA+PROBE: NOT DETECTED COPIES/ML
HIV1 RNA SERPL QL NAA+PROBE: NOT DETECTED

## 2023-08-09 NOTE — PHYSICIAN QUERY
PT Name: Apollo Nguyen  MR #: [id number]    DOCUMENTATION CLARIFICATION     CDS/: Tali Perez RN           Contact information: [email or web address]     This form is a permanent document in the medical record.     Query Date: August 9, 2023      By submitting this query, we are merely seeking further clarification of documentation. Please utilize your independent clinical judgment when addressing the question(s) below.    The Medical Record contains the following:   Indicators Supporting Clinical Findings Location in Medical Record   x HIV or AIDS   HIV disease ED Provider note, H & P, HM PN and discharge summary 8/4 to 8/6    x CD4 Count          CD4%  08/04/23    Absolute CD4 668   CD4 % [place] T Cell 16.5 (L)    Lab of 8/4                x History of or current Opportunistic Infection   HIV disease  This patient in known to have HIV but do not have recorded CD4 count or viral load; will check. Patient is on HAART. Will continue HAART. Prophylaxis was not indicated at this  time. Continue to monitor routine labs.  We will not consult Infectious disease at this time. Other HIV-associated diseases are not present.    H & P of 8/4    x Acute/Chronic Illness Acute illness: acute renal failure with tubular necrosis, class 1 obesity in adult, nausea and vomiting, Hypotension, Hypertension, HIV Disease, PATRIC        63-year-old man with HIV, hypertension, and hyperlipidemia who presents with 3 years of intermittent dizziness and blurry vision which has recently been worsening. H & P of 8/4            H & P of 8/4     AIDS-Defining Condition or HIV-Related Illness documented       x Medication BIKTARVY -25 mg (25 kg or greater) Take 1 tablet by mouth.    emuwovmki-ukeqacdf-lkjmzfe ala -25 mg oral daily          H & P home med list of 8/4       MAR 8/5 to 8/6     Other       The clinical guidelines noted are only a system guideline. It does not replace the providers clinical  judgment.    The following are AIDS-Defining Conditions or HIV-Related Illnesses (noted in more recent literature as Opportunistic Illnesses in HIV Infection):   Candidiasis of bronchi, trachea, or lungs Lymphoma, Burkitt (or equivalent term)   Candidiasis of esophagus Lymphoma, immunoblastic (or equivalent term)   Cervical cancer, invasive only among adults, adolescents, and children aged > 6 years Lymphoma, primary, of brain   Coccidioidomycosis, disseminated or extrapulmonary Mycobacterium avium complex or Mycobacterium kansasii, disseminated or extrapulmonary   Cryptococcosis, extrapulmonary Mycobacterium tuberculosis of any site, pulmonary (only among adults, adolescents, and children aged >6 years), disseminated, or extrapulmonary   Cryptosporidiosis, chronic intestinal (>1 months duration) Mycobacterium, other species or unidentified species, disseminated or extrapulmonary   Cytomegalovirus disease (other than liver, spleen, or nodes), onset at age >1 month Pneumocystitis jirovecii (previously known as Pneumocystis carinii) pneumonia   Cytomegalovirus retinitis (with loss of vision) Pneumonia, recurrent only among adults, adolescents, and children aged >6 years   Encephalopathy attributed to HIV Progressive multifocal leukoencephalopathy   Herpes simplex: chronic ulcers (>1 months  duration) or bronchitis, pneumonitis, or esophagitis (onset at age >1 month) Salmonella septicemia, recurrent   Histoplasmosis, disseminated or extrapulmonary Toxoplasmosis of brain, onset at age >1 month   Isosporiasis, chronic intestinal (>1 months duration) Wasting syndrome attributed to HIV   Kaposi sarcoma        Once a patient is diagnosed with HIV Disease or AIDS the diagnosis still stands even if, after treatment, the CD4+ T-lymphocyte count rises to above 200 µL or other AIDS-Defining Conditions or HIV-Related Illnesses are resolved.       Provider, please clarify the patient's HIV diagnosis.    [  x ] Asymptomatic  HIV Infection - Positive Status Only without any history of or current AIDS-Defining Condition or HIV-Related Illness and without a history of or current CD4+ T-Lymphocyte count < 200 uL or total percentage of T-lymphocytes <14 (percentage is used only if the count is missing)   [   ] HIV Disease or AIDS, please check the applicable support for the diagnosis:    [   ] Patient has or had a history of CD4+ T-Lymphocyte count < 200 uL or total percentage of T-lymphocytes <14 (percentage is used only if the count is missing)   [   ] Patient has or had a history of AIDS-Defining Condition or HIV-Related Illness (please specify if not previously documented): ______________   [   ] Patient has or had a history of CD4+ T-Lymphocyte count < 200 uL or total percentage of T-lymphocytes <14 (percentage is used only if the count is missing) and an AIDS-Defining Condition or HIV-Related Illness (please specify if not previously documented): ______________      [   ] Patient is HIV Negative   [   ] Other (please specify):______________     Please document in your progress notes daily for the duration of treatment until resolved and include in your discharge summary.    References:  Revised Surveillance Case Definition for HIV Infection - [place], 2014. (2014, April 11). Retrieved November 09, 2020, from https://www.cdc.gov/mmwr/preview/mmwrhtml/zu6342b2.htm?s_cid=ok5223k3_n  ALEE Wiley MD. (2019, April). Techniques and interpretation of measurement of the CD4 cell count in HIV-infected patients ([id number] TONE Gayle MD & [id number] LAZARO Ivory MD, MPH, Eds.). Retrieved November 09, 2020, from https://www.[employer]/contents/techniques-and-interpretation-cc-rfmwerhwpmd-gw-the-cd4-cell-count-in-hiv-infected-patients?search=hiv%20disease%20cd4%20count&source=search_result&selectedTitle=1~150&usage_type=default&display_rank=1#[id number]    Form 69364

## 2023-08-13 ENCOUNTER — HOSPITAL ENCOUNTER (INPATIENT)
Facility: HOSPITAL | Age: 63
LOS: 1 days | Discharge: HOME | DRG: 683 | End: 2023-08-14
Attending: EMERGENCY MEDICINE | Admitting: STUDENT IN AN ORGANIZED HEALTH CARE EDUCATION/TRAINING PROGRAM
Payer: COMMERCIAL

## 2023-08-13 DIAGNOSIS — R06.00 DYSPNEA: ICD-10-CM

## 2023-08-13 DIAGNOSIS — R00.1 BRADYCARDIA: ICD-10-CM

## 2023-08-13 DIAGNOSIS — N17.9 AKI (ACUTE KIDNEY INJURY): Primary | ICD-10-CM

## 2023-08-13 DIAGNOSIS — R07.9 CHEST PAIN: ICD-10-CM

## 2023-08-13 DIAGNOSIS — B20 HIV DISEASE: ICD-10-CM

## 2023-08-13 PROBLEM — K21.9 GERD (GASTROESOPHAGEAL REFLUX DISEASE): Status: ACTIVE | Noted: 2023-08-13

## 2023-08-13 LAB
ALBUMIN SERPL BCP-MCNC: 4.5 G/DL (ref 3.5–5.2)
ALP SERPL-CCNC: 63 U/L (ref 55–135)
ALT SERPL W/O P-5'-P-CCNC: 30 U/L (ref 10–44)
ANION GAP SERPL CALC-SCNC: 14 MMOL/L (ref 8–16)
AST SERPL-CCNC: 10 U/L (ref 10–40)
BASOPHILS # BLD AUTO: 0.02 K/UL (ref 0–0.2)
BASOPHILS NFR BLD: 0.3 % (ref 0–1.9)
BILIRUB SERPL-MCNC: 0.7 MG/DL (ref 0.1–1)
BILIRUB UR QL STRIP: NEGATIVE
BNP SERPL-MCNC: 16 PG/ML (ref 0–99)
BUN SERPL-MCNC: 84 MG/DL (ref 8–23)
CALCIUM SERPL-MCNC: 9 MG/DL (ref 8.7–10.5)
CHLORIDE SERPL-SCNC: 102 MMOL/L (ref 95–110)
CLARITY UR: CLEAR
CO2 SERPL-SCNC: 19 MMOL/L (ref 23–29)
COLOR UR: YELLOW
CREAT SERPL-MCNC: 4.6 MG/DL (ref 0.5–1.4)
CREAT UR-MCNC: 428.1 MG/DL (ref 23–375)
CREAT UR-MCNC: 428.1 MG/DL (ref 23–375)
CTP QC/QA: YES
DIFFERENTIAL METHOD: NORMAL
EOSINOPHIL # BLD AUTO: 0.2 K/UL (ref 0–0.5)
EOSINOPHIL NFR BLD: 2.5 % (ref 0–8)
ERYTHROCYTE [DISTWIDTH] IN BLOOD BY AUTOMATED COUNT: 12.9 % (ref 11.5–14.5)
EST. GFR  (NO RACE VARIABLE): 14 ML/MIN/1.73 M^2
GLUCOSE SERPL-MCNC: 107 MG/DL (ref 70–110)
GLUCOSE UR QL STRIP: NEGATIVE
HCT VFR BLD AUTO: 43.5 % (ref 40–54)
HGB BLD-MCNC: 15.4 G/DL (ref 14–18)
HGB UR QL STRIP: NEGATIVE
IMM GRANULOCYTES # BLD AUTO: 0.02 K/UL (ref 0–0.04)
IMM GRANULOCYTES NFR BLD AUTO: 0.3 % (ref 0–0.5)
KETONES UR QL STRIP: NEGATIVE
LEUKOCYTE ESTERASE UR QL STRIP: NEGATIVE
LYMPHOCYTES # BLD AUTO: 3 K/UL (ref 1–4.8)
LYMPHOCYTES NFR BLD: 39.4 % (ref 18–48)
MAGNESIUM SERPL-MCNC: 2.4 MG/DL (ref 1.6–2.6)
MCH RBC QN AUTO: 31 PG (ref 27–31)
MCHC RBC AUTO-ENTMCNC: 35.4 G/DL (ref 32–36)
MCV RBC AUTO: 88 FL (ref 82–98)
MONOCYTES # BLD AUTO: 0.8 K/UL (ref 0.3–1)
MONOCYTES NFR BLD: 10.9 % (ref 4–15)
NEUTROPHILS # BLD AUTO: 3.5 K/UL (ref 1.8–7.7)
NEUTROPHILS NFR BLD: 46.6 % (ref 38–73)
NITRITE UR QL STRIP: NEGATIVE
NRBC BLD-RTO: 0 /100 WBC
OSMOLALITY UR: 470 MOSM/KG (ref 50–1200)
PH UR STRIP: 5 [PH] (ref 5–8)
PLATELET # BLD AUTO: 192 K/UL (ref 150–450)
PMV BLD AUTO: 10.4 FL (ref 9.2–12.9)
POCT GLUCOSE: 112 MG/DL (ref 70–110)
POTASSIUM SERPL-SCNC: 4.8 MMOL/L (ref 3.5–5.1)
PROT SERPL-MCNC: 7.4 G/DL (ref 6–8.4)
PROT UR QL STRIP: ABNORMAL
RBC # BLD AUTO: 4.97 M/UL (ref 4.6–6.2)
SARS-COV-2 RDRP RESP QL NAA+PROBE: NEGATIVE
SODIUM SERPL-SCNC: 135 MMOL/L (ref 136–145)
SODIUM UR-SCNC: <20 MMOL/L (ref 20–250)
SODIUM UR-SCNC: <20 MMOL/L (ref 20–250)
SP GR UR STRIP: 1.02 (ref 1–1.03)
TROPONIN I SERPL DL<=0.01 NG/ML-MCNC: 0.01 NG/ML (ref 0–0.03)
URN SPEC COLLECT METH UR: ABNORMAL
UROBILINOGEN UR STRIP-ACNC: NEGATIVE EU/DL
UUN UR-MCNC: 525 MG/DL (ref 140–1050)
WBC # BLD AUTO: 7.53 K/UL (ref 3.9–12.7)

## 2023-08-13 PROCEDURE — 80053 COMPREHEN METABOLIC PANEL: CPT | Performed by: EMERGENCY MEDICINE

## 2023-08-13 PROCEDURE — 83735 ASSAY OF MAGNESIUM: CPT | Performed by: EMERGENCY MEDICINE

## 2023-08-13 PROCEDURE — 84300 ASSAY OF URINE SODIUM: CPT | Performed by: EMERGENCY MEDICINE

## 2023-08-13 PROCEDURE — 63600175 PHARM REV CODE 636 W HCPCS: Performed by: EMERGENCY MEDICINE

## 2023-08-13 PROCEDURE — 83880 ASSAY OF NATRIURETIC PEPTIDE: CPT | Performed by: EMERGENCY MEDICINE

## 2023-08-13 PROCEDURE — 99285 EMERGENCY DEPT VISIT HI MDM: CPT | Mod: 25

## 2023-08-13 PROCEDURE — 63600175 PHARM REV CODE 636 W HCPCS

## 2023-08-13 PROCEDURE — 63600175 PHARM REV CODE 636 W HCPCS: Performed by: FAMILY MEDICINE

## 2023-08-13 PROCEDURE — 85025 COMPLETE CBC W/AUTO DIFF WBC: CPT | Performed by: EMERGENCY MEDICINE

## 2023-08-13 PROCEDURE — 11000001 HC ACUTE MED/SURG PRIVATE ROOM

## 2023-08-13 PROCEDURE — 82570 ASSAY OF URINE CREATININE: CPT | Performed by: EMERGENCY MEDICINE

## 2023-08-13 PROCEDURE — 83935 ASSAY OF URINE OSMOLALITY: CPT

## 2023-08-13 PROCEDURE — A4216 STERILE WATER/SALINE, 10 ML: HCPCS | Performed by: FAMILY MEDICINE

## 2023-08-13 PROCEDURE — 93005 ELECTROCARDIOGRAM TRACING: CPT

## 2023-08-13 PROCEDURE — 84484 ASSAY OF TROPONIN QUANT: CPT | Performed by: EMERGENCY MEDICINE

## 2023-08-13 PROCEDURE — 81003 URINALYSIS AUTO W/O SCOPE: CPT | Performed by: EMERGENCY MEDICINE

## 2023-08-13 PROCEDURE — 84540 ASSAY OF URINE/UREA-N: CPT

## 2023-08-13 PROCEDURE — 93010 ELECTROCARDIOGRAM REPORT: CPT | Mod: ,,, | Performed by: INTERNAL MEDICINE

## 2023-08-13 PROCEDURE — 93010 EKG 12-LEAD: ICD-10-PCS | Mod: ,,, | Performed by: INTERNAL MEDICINE

## 2023-08-13 PROCEDURE — 87635 SARS-COV-2 COVID-19 AMP PRB: CPT | Performed by: EMERGENCY MEDICINE

## 2023-08-13 PROCEDURE — 25000003 PHARM REV CODE 250: Performed by: FAMILY MEDICINE

## 2023-08-13 RX ORDER — NALOXONE HCL 0.4 MG/ML
0.02 VIAL (ML) INJECTION
Status: DISCONTINUED | OUTPATIENT
Start: 2023-08-13 | End: 2023-08-14 | Stop reason: HOSPADM

## 2023-08-13 RX ORDER — AMOXICILLIN 250 MG
1 CAPSULE ORAL 2 TIMES DAILY
Status: DISCONTINUED | OUTPATIENT
Start: 2023-08-13 | End: 2023-08-14 | Stop reason: HOSPADM

## 2023-08-13 RX ORDER — SODIUM CHLORIDE, SODIUM LACTATE, POTASSIUM CHLORIDE, CALCIUM CHLORIDE 600; 310; 30; 20 MG/100ML; MG/100ML; MG/100ML; MG/100ML
INJECTION, SOLUTION INTRAVENOUS CONTINUOUS
Status: DISCONTINUED | OUTPATIENT
Start: 2023-08-13 | End: 2023-08-14 | Stop reason: HOSPADM

## 2023-08-13 RX ORDER — IBUPROFEN 200 MG
24 TABLET ORAL
Status: DISCONTINUED | OUTPATIENT
Start: 2023-08-13 | End: 2023-08-13

## 2023-08-13 RX ORDER — INSULIN ASPART 100 [IU]/ML
1-10 INJECTION, SOLUTION INTRAVENOUS; SUBCUTANEOUS
Status: DISCONTINUED | OUTPATIENT
Start: 2023-08-13 | End: 2023-08-14 | Stop reason: HOSPADM

## 2023-08-13 RX ORDER — PANTOPRAZOLE SODIUM 40 MG/1
40 TABLET, DELAYED RELEASE ORAL DAILY
Status: DISCONTINUED | OUTPATIENT
Start: 2023-08-14 | End: 2023-08-14 | Stop reason: HOSPADM

## 2023-08-13 RX ORDER — INSULIN ASPART 100 [IU]/ML
0-5 INJECTION, SOLUTION INTRAVENOUS; SUBCUTANEOUS
Status: DISCONTINUED | OUTPATIENT
Start: 2023-08-13 | End: 2023-08-13

## 2023-08-13 RX ORDER — TALC
6 POWDER (GRAM) TOPICAL NIGHTLY PRN
Status: DISCONTINUED | OUTPATIENT
Start: 2023-08-13 | End: 2023-08-14 | Stop reason: HOSPADM

## 2023-08-13 RX ORDER — SIMETHICONE 80 MG
1 TABLET,CHEWABLE ORAL 4 TIMES DAILY PRN
Status: DISCONTINUED | OUTPATIENT
Start: 2023-08-13 | End: 2023-08-14 | Stop reason: HOSPADM

## 2023-08-13 RX ORDER — MAG HYDROX/ALUMINUM HYD/SIMETH 200-200-20
30 SUSPENSION, ORAL (FINAL DOSE FORM) ORAL 4 TIMES DAILY PRN
Status: DISCONTINUED | OUTPATIENT
Start: 2023-08-13 | End: 2023-08-14 | Stop reason: HOSPADM

## 2023-08-13 RX ORDER — ACETAMINOPHEN 325 MG/1
650 TABLET ORAL EVERY 4 HOURS PRN
Status: DISCONTINUED | OUTPATIENT
Start: 2023-08-13 | End: 2023-08-14 | Stop reason: HOSPADM

## 2023-08-13 RX ORDER — POLYETHYLENE GLYCOL 3350 17 G/17G
17 POWDER, FOR SOLUTION ORAL DAILY
Status: DISCONTINUED | OUTPATIENT
Start: 2023-08-14 | End: 2023-08-14 | Stop reason: HOSPADM

## 2023-08-13 RX ORDER — ASPIRIN 81 MG/1
81 TABLET ORAL DAILY
Status: DISCONTINUED | OUTPATIENT
Start: 2023-08-14 | End: 2023-08-14 | Stop reason: HOSPADM

## 2023-08-13 RX ORDER — TALC
9 POWDER (GRAM) TOPICAL NIGHTLY PRN
Status: DISCONTINUED | OUTPATIENT
Start: 2023-08-13 | End: 2023-08-13

## 2023-08-13 RX ORDER — SODIUM CHLORIDE 0.9 % (FLUSH) 0.9 %
5 SYRINGE (ML) INJECTION
Status: DISCONTINUED | OUTPATIENT
Start: 2023-08-13 | End: 2023-08-14 | Stop reason: HOSPADM

## 2023-08-13 RX ORDER — AMOXICILLIN 250 MG
1 CAPSULE ORAL 2 TIMES DAILY PRN
Status: DISCONTINUED | OUTPATIENT
Start: 2023-08-13 | End: 2023-08-14 | Stop reason: HOSPADM

## 2023-08-13 RX ORDER — SODIUM CHLORIDE 0.9 % (FLUSH) 0.9 %
10 SYRINGE (ML) INJECTION EVERY 8 HOURS
Status: DISCONTINUED | OUTPATIENT
Start: 2023-08-13 | End: 2023-08-14 | Stop reason: HOSPADM

## 2023-08-13 RX ORDER — ACETAMINOPHEN 325 MG/1
650 TABLET ORAL EVERY 8 HOURS PRN
Status: DISCONTINUED | OUTPATIENT
Start: 2023-08-13 | End: 2023-08-13

## 2023-08-13 RX ORDER — LIDOCAINE 50 MG/G
1 PATCH TOPICAL DAILY PRN
Status: DISCONTINUED | OUTPATIENT
Start: 2023-08-13 | End: 2023-08-14 | Stop reason: HOSPADM

## 2023-08-13 RX ORDER — IBUPROFEN 200 MG
16 TABLET ORAL
Status: DISCONTINUED | OUTPATIENT
Start: 2023-08-13 | End: 2023-08-13

## 2023-08-13 RX ORDER — GLUCAGON 1 MG
1 KIT INJECTION
Status: DISCONTINUED | OUTPATIENT
Start: 2023-08-13 | End: 2023-08-13

## 2023-08-13 RX ORDER — HEPARIN SODIUM 5000 [USP'U]/ML
5000 INJECTION, SOLUTION INTRAVENOUS; SUBCUTANEOUS EVERY 8 HOURS
Status: DISCONTINUED | OUTPATIENT
Start: 2023-08-13 | End: 2023-08-14 | Stop reason: HOSPADM

## 2023-08-13 RX ORDER — GLUCAGON 1 MG
1 KIT INJECTION
Status: DISCONTINUED | OUTPATIENT
Start: 2023-08-13 | End: 2023-08-14 | Stop reason: HOSPADM

## 2023-08-13 RX ORDER — PROCHLORPERAZINE EDISYLATE 5 MG/ML
5 INJECTION INTRAMUSCULAR; INTRAVENOUS EVERY 6 HOURS PRN
Status: DISCONTINUED | OUTPATIENT
Start: 2023-08-13 | End: 2023-08-14 | Stop reason: HOSPADM

## 2023-08-13 RX ORDER — ONDANSETRON 2 MG/ML
4 INJECTION INTRAMUSCULAR; INTRAVENOUS EVERY 8 HOURS PRN
Status: DISCONTINUED | OUTPATIENT
Start: 2023-08-13 | End: 2023-08-14 | Stop reason: HOSPADM

## 2023-08-13 RX ORDER — IBUPROFEN 200 MG
16 TABLET ORAL
Status: DISCONTINUED | OUTPATIENT
Start: 2023-08-13 | End: 2023-08-14 | Stop reason: HOSPADM

## 2023-08-13 RX ORDER — SODIUM CHLORIDE, SODIUM LACTATE, POTASSIUM CHLORIDE, CALCIUM CHLORIDE 600; 310; 30; 20 MG/100ML; MG/100ML; MG/100ML; MG/100ML
INJECTION, SOLUTION INTRAVENOUS CONTINUOUS
Status: DISCONTINUED | OUTPATIENT
Start: 2023-08-13 | End: 2023-08-13

## 2023-08-13 RX ORDER — TAMSULOSIN HYDROCHLORIDE 0.4 MG/1
0.4 CAPSULE ORAL DAILY
Status: DISCONTINUED | OUTPATIENT
Start: 2023-08-14 | End: 2023-08-14 | Stop reason: HOSPADM

## 2023-08-13 RX ORDER — IBUPROFEN 200 MG
24 TABLET ORAL
Status: DISCONTINUED | OUTPATIENT
Start: 2023-08-13 | End: 2023-08-14 | Stop reason: HOSPADM

## 2023-08-13 RX ORDER — ONDANSETRON 8 MG/1
8 TABLET, ORALLY DISINTEGRATING ORAL EVERY 8 HOURS PRN
Status: DISCONTINUED | OUTPATIENT
Start: 2023-08-13 | End: 2023-08-13

## 2023-08-13 RX ORDER — ATORVASTATIN CALCIUM 10 MG/1
10 TABLET, FILM COATED ORAL DAILY
Status: DISCONTINUED | OUTPATIENT
Start: 2023-08-14 | End: 2023-08-14 | Stop reason: HOSPADM

## 2023-08-13 RX ADMIN — HEPARIN SODIUM 5000 UNITS: 5000 INJECTION INTRAVENOUS; SUBCUTANEOUS at 09:08

## 2023-08-13 RX ADMIN — SODIUM CHLORIDE, POTASSIUM CHLORIDE, SODIUM LACTATE AND CALCIUM CHLORIDE: 600; 310; 30; 20 INJECTION, SOLUTION INTRAVENOUS at 05:08

## 2023-08-13 RX ADMIN — SODIUM CHLORIDE 10 ML: 9 INJECTION, SOLUTION INTRAMUSCULAR; INTRAVENOUS; SUBCUTANEOUS at 10:08

## 2023-08-13 RX ADMIN — DOCUSATE SODIUM AND SENNOSIDES 1 TABLET: 8.6; 5 TABLET, FILM COATED ORAL at 09:08

## 2023-08-13 RX ADMIN — SODIUM CHLORIDE, POTASSIUM CHLORIDE, SODIUM LACTATE AND CALCIUM CHLORIDE 1000 ML: 600; 310; 30; 20 INJECTION, SOLUTION INTRAVENOUS at 01:08

## 2023-08-13 RX ADMIN — HEPARIN SODIUM 5000 UNITS: 5000 INJECTION INTRAVENOUS; SUBCUTANEOUS at 03:08

## 2023-08-13 NOTE — ED PROVIDER NOTES
Encounter Date: 8/13/2023       History     Chief Complaint   Patient presents with    Shortness of Breath     Pt has hx of HIV, HTN, CKD  pt complains of SOB, intermittent dizziness that began on fri       63-year-old man with HIV, hypertension, and hyperlipidemia   Presents with complaint of shortness of breath.  Patient says that over the past few days he is had intermittent episodes of shortness of breath with associated chest pain.  Says that he has been having for the past month or so, but it worsened over the past few days prompting him to come to the ER.  He says that he did not discussed this with the doctors when he was recently admitted because it was not bothering him as much at that time.  He denies fever, chills, cough.  Says that he last had diarrhea  4 days ago and has resolved. Reports he is compliant with all of his medications, including Biktarvy for his HIV.   Stopped taking Ozempic.   Patient says he was not compliant with his nighttime CPAP because he does not like the way it makes him feel.    The history is provided by the patient. The history is limited by a language barrier. A  was used.     Review of patient's allergies indicates:  No Known Allergies  Past Medical History:   Diagnosis Date    MARCELA (acute kidney injury)     CKD (chronic kidney disease) stage 3, GFR 30-59 ml/min     High cholesterol     HIV disease     Hypertension     Hypocalcemia     Hypophosphatemia     Osteopenia     Prostate disorder     Proteinuria      No past surgical history on file.  No family history on file.  Social History     Tobacco Use    Smoking status: Former     Current packs/day: 0.00    Smokeless tobacco: Never   Substance Use Topics    Alcohol use: No    Drug use: No     Comment: used in the past     Review of Systems    Physical Exam     Initial Vitals [08/13/23 1153]   BP Pulse Resp Temp SpO2   (!) 103/55 60 18 98 °F (36.7 °C) 97 %      MAP       --         Physical  Exam    Constitutional: He appears well-developed and well-nourished. He is not diaphoretic. No distress.   HENT:   Head: Normocephalic and atraumatic.   Eyes: EOM are normal.   Neck:   Normal range of motion.  Cardiovascular:  Normal rate.           Pulmonary/Chest: Breath sounds normal. No respiratory distress. He has no wheezes.   Abdominal: Abdomen is soft. He exhibits no distension.   Musculoskeletal:         General: No tenderness or edema. Normal range of motion.      Cervical back: Normal range of motion.     Neurological: He is alert and oriented to person, place, and time.   Skin: Skin is warm and dry.   Psychiatric: He has a normal mood and affect.         ED Course   ED US Echo    Date/Time: 8/13/2023 1:07 PM    Performed by: Kalli Shipley MD  Authorized by: Kalli Shipley MD    Indication:  Dyspnea  Identified Structures:     The pericardial sac, myocardium, and 4 chambers were identified with the following echocardiographic windows:  Parasternal long axis, Parasternal short axis and Apical 4-chamber  Findings:     Pericardial Effusion:  Absent    Left Ventricle Ejection Fraction:  Normal (>55%)  IVC:     Gross [place] (RV:LV):  Normal    Aortic Root Diameter (cm):  3     Normal    Impression:  Normal limited cardiac ultrasound  ED US Lung    Date/Time: 8/13/2023 1:07 PM    Performed by: Kalli Shipley MD  Authorized by: Kalli Shipley MD    Indication:  Dyspnea  Identified Structures:  The thoracic cavities and diaphragm were examined  Lung sliding:  Present  Pleural effusion:  Absent  B-lines:  Absent (3 or less)  Lung sliding:  Present  Pleural effusion:  Absent  B-lines:  Absent (3 or less)   Normal thoracic evaluation, no pneumothorax or edema  Critical Care    Date/Time: 8/13/2023 1:24 PM    Performed by: Kalli Shipley MD  Authorized by: Kalli Shipley MD  Total critical care time (exclusive of procedural time) : 30 minutes  Critical care time was  exclusive of separately billable procedures and treating other patients.  Critical care was necessary to treat or prevent imminent or life-threatening deterioration of the following conditions: renal failure.  Critical care was time spent personally by me on the following activities: development of treatment plan with patient or surrogate, interpretation of cardiac output measurements, evaluation of patient's response to treatment, examination of patient, obtaining history from patient or surrogate, ordering and performing treatments and interventions, ordering and review of laboratory studies, ordering and review of radiographic studies, pulse oximetry, re-evaluation of patient's condition and review of old charts.        Labs Reviewed   COMPREHENSIVE METABOLIC PANEL - Abnormal; Notable for the following components:       Result Value    Sodium 135 (*)     CO2 19 (*)     BUN 84 (*)     Creatinine 4.6 (*)     eGFR 14 (*)     All other components within normal limits   CBC W/ AUTO DIFFERENTIAL   MAGNESIUM   TROPONIN I   B-TYPE NATRIURETIC PEPTIDE   URINALYSIS, REFLEX TO URINE CULTURE   SODIUM, URINE, RANDOM   CREATININE, URINE, RANDOM   SARS-COV-2 RDRP GENE     EKG Readings: (Independently Interpreted)   Initial Reading: No STEMI. Previous EKG: Compared with most recent EKG Rhythm: Sinus Bradycardia. Heart Rate: 58. Ectopy: No Ectopy. Axis: Normal. Clinical Impression: Normal Sinus Rhythm       Imaging Results              X-Ray Chest AP Portable (Final result)  Result time 08/13/23 13:16:38      Final result by Rafiq Martinez MD (08/13/23 13:16:38)                   Impression:      As above.      Electronically signed by: Rafiq Martinez MD  Date:    08/13/2023  Time:    13:16               Narrative:    EXAMINATION:  XR CHEST AP PORTABLE    CLINICAL HISTORY:  Dyspnea, unspecified    TECHNIQUE:  Single frontal view of the chest was performed.    FINDINGS:  The lungs are clear.  There is no pneumothorax or pleural  "fluid.  The cardiac silhouette is not enlarged.  The osseous structures demonstrate degenerative change.                                       Medications   lactated ringers bolus 1,000 mL (1,000 mLs Intravenous New Bag 8/13/23 1315)     Medical Decision Making:   History:   Old Records Summarized: records from previous admission(s).       <> Summary of Records: Admitted 8/4-8/6/23: "Mr. Nguyen presented with dizziness, vision changes, and substantial MARCELA in the setting recent Ozempic use.  Found to be hypotensive at time of admission; he was given fluid bolus and home antihypertensives were held with substantial improvement in his symptoms.  MARCELA has resolved as well.  Stable for discharge; I have reduced his home lisinopril for now, but suspect that he will be able to go back on his 20 mg dose if blood pressure is increasing as outpatient.  Will discontinue Ozempic for now; can discuss alternate options with primary care physician in outpatient setting."  Differential Diagnosis:   CHF, pna, PATRIC, PE, anemia, MARCELA   Clinical Tests:   Lab Tests: Ordered and Reviewed  Radiological Study: Ordered and Reviewed  Medical Tests: Reviewed and Ordered  ED Management:   63-year-old male presents with intermittent episodes of shortness of breath and chest pain.  In the ER he is afebrile, satting comfortably on room air, currently asymptomatic.  Bedside ultrasound shows normal EF, no evidence of pulmonary edema.  Ordered IV fluids, chest x-ray, labs.   Patient found to be in renal failure with a creatinine of 4.6.  Discussed with [facility] service and admitted for further management.  Other:   I have discussed this case with another health care provider.             ED Course as of 08/13/23 1323   Sun Aug 13, 2023   1227 8/4/23 CT Impression:     1. No acute intracranial abnormalities noting sequela of chronic microvascular ischemic change and senescent change.  2. Left sphenoid sinus disease. [AT] "   1301 WBC: 7.53  Normal  [AT]   1301 Hemoglobin: 15.4  Normal  [AT]   1313 Creatinine(!): 4.6  Renal failure [AT]   1314 Potassium: 4.8  Normal  [AT]   1314 Troponin I: 0.007  Normal  [AT]      ED Course User Index  [AT] Kalli Shipley MD                   Clinical Impression:   Final diagnoses:  [R06.00] Dyspnea  [N17.9] MARCELA (acute kidney injury)        ED Disposition Condition    Admit Stable                Kalli Shipley MD  08/13/23 2453

## 2023-08-13 NOTE — PROGRESS NOTES
VN cued into room to complete admit assessment. VIP model introduced; VN working alongside bedside treatment team.  Plan of care reviewed with patient. Patient informed of fall risk, fall precautions, call light within reach, side rails x2 elevated. Patient notified to ask staff for assistance. Patient verbalized complete understanding. Time allowed for questions. Will continue to monitor and intervene as needed. Assisted by  Apollo #[id number].           08/13/23 1824   Admission   Initial VN Admission Questions Complete   Communication Issues? None  ([place] speaking)   Shift   Virtual Nurse - Patient Verbalized Approval Of Camera Use   Safety/Activity   Patient Rounds bed in low position;call light in patient/parent reach;clutter free environment maintained;visualized patient   Safety Promotion/Fall Prevention side rails raised x 2   Activity Management Ambulated in room - L4   Positioning   Body Position sitting up in bed

## 2023-08-13 NOTE — PLAN OF CARE
Problem: Adult Inpatient Plan of Care  Goal: Plan of Care Review  Outcome: Ongoing, Progressing      Due for phone visit  Sent MyChart to pt  Kamala GRANDA RN

## 2023-08-13 NOTE — Clinical Note
Diagnosis: MARCELA (acute kidney injury) [[id number]]   Admitting Provider:: LEXX SMITH [[id number]]   Future Attending Provider: LEXX SMITH [[id number]]   Reason for IP Medical Treatment  (Clinical interventions that can only be accomplished in the IP setting? ) :: MARCELA   I certify that Inpatient services for greater than or equal to 2 midnights are medically necessary:: Yes   Plans for Post-Acute care--if anticipated (pick the single best option):: A. No post acute care anticipated at this time

## 2023-08-13 NOTE — ASSESSMENT & PLAN NOTE
Patient with acute kidney injury/acute renal failure likely due to pre-renal azotemia due to dehydration MARCELA is currently worsening. Baseline creatinine 1.4 - Labs reviewed- Renal function/electrolytes with Estimated Creatinine Clearance: 18 mL/min (A) (based on SCr of 4.6 mg/dL (H)). according to latest data. Monitor urine output and serial BMP and adjust therapy as needed. Avoid nephrotoxins and renally dose meds for GFR listed above.

## 2023-08-13 NOTE — ASSESSMENT & PLAN NOTE
"This patient in known to have AIDS (from CD4 count has been less than 200). Labs reviewed- No results found for: "CD4", No results found for: "HIVDNAPCR". Patient is on HAART. Will continue HAART. Prophylaxis was not indicated at this time- . Continue to monitor routine labs. Last CD4 count was   Lab Results   Component Value Date    ABSOLUTECD4 668 08/04/2023       We will not consult Infectious disease at this time. Other HIV-associated diseases are not present.    CD4 16.5 on 8/4/2023  Viral load - not detected  "

## 2023-08-13 NOTE — SUBJECTIVE & OBJECTIVE
Past Medical History:   Diagnosis Date    MARCELA (acute kidney injury)     CKD (chronic kidney disease) stage 3, GFR 30-59 ml/min     High cholesterol     HIV disease     Hypertension     Hypocalcemia     Hypophosphatemia     Osteopenia     Prostate disorder     Proteinuria        No past surgical history on file.    Review of patient's allergies indicates:  No Known Allergies    No current facility-administered medications on file prior to encounter.     Current Outpatient Medications on File Prior to Encounter   Medication Sig    acetaminophen (TYLENOL) 500 MG tablet Take 500 mg by mouth every 6 (six) hours as needed for Pain.    aspirin (ECOTRIN) 81 MG EC tablet Take 81 mg by mouth once daily.    atorvastatin (LIPITOR) 10 MG tablet Take 10 mg by mouth.    eujodulzi-gzmcaqgw-dfcydvf ala (BIKTARVY) -25 mg (25 kg or greater) Take 1 tablet by mouth once daily.    cholecalciferol, vitamin D3, 125 mcg (5,000 unit) Tab Take 5,000 Units by mouth once daily.    lisinopriL (PRINIVIL,ZESTRIL) 20 MG tablet Take 0.5 tablets (10 mg total) by mouth once daily.    loratadine (CLARITIN) 10 mg tablet Take 10 mg by mouth daily as needed.    metFORMIN (GLUCOPHAGE-XR) 500 MG ER 24hr tablet Take 500 mg by mouth every evening.    multivitamin (THERAGRAN) per tablet Take 1 tablet by mouth once daily.    omega-3s-dha-epa-fish oil 900-110-680 mg Cap Take 1 capsule by mouth once daily.    pantoprazole (PROTONIX) 20 MG tablet Take 20 mg by mouth once daily.    sildenafiL (VIAGRA) 50 MG tablet TAKE 1 TO 2 TABLETS BY MOUTH EVERY 72 HOURS    tamsulosin (FLOMAX) 0.4 mg Cap Take 0.4 mg by mouth once daily.    testosterone (ANDROGEL) 1 % (50 mg/5 gram) GlPk Place 5 g onto the skin once daily.     Family History    None       Tobacco Use    Smoking status: Former     Current packs/day: 0.00    Smokeless tobacco: Never   Substance and Sexual Activity    Alcohol use: No    Drug use: No     Comment: used in the past    Sexual activity: Not on file      Review of Systems   Constitutional:  Positive for appetite change. Negative for activity change, chills and fever.   Eyes:  Negative for photophobia and visual disturbance.   Respiratory:  Negative for cough and shortness of breath.    Gastrointestinal:  Positive for nausea and vomiting.   Endocrine: Negative for polyphagia and polyuria.   Genitourinary:  Negative for dysuria.   Neurological:  Negative for dizziness, tremors, weakness and headaches.   Psychiatric/Behavioral:  Negative for agitation and confusion.      Objective:     Vital Signs (Most Recent):  Temp: 98 °F (36.7 °C) (08/13/23 1153)  Pulse: (!) 54 (08/13/23 1332)  Resp: (!) 24 (08/13/23 1356)  BP: (!) 93/54 (08/13/23 1332)  SpO2: (!) 94 % (08/13/23 1332) Vital Signs (24h Range):  Temp:  [98 °F (36.7 °C)] 98 °F (36.7 °C)  Pulse:  [54-60] 54  Resp:  [18-24] 24  SpO2:  [94 %-97 %] 94 %  BP: ()/(53-55) 93/54     Weight: 96.2 kg (212 lb)  Body mass index is 35.28 kg/m².     Physical Exam  Constitutional:       General: He is not in acute distress.     Appearance: He is obese. He is not ill-appearing or toxic-appearing.   HENT:      Head: Normocephalic and atraumatic.      Nose: Nose normal.   Eyes:      Pupils: Pupils are equal, round, and reactive to light.   Cardiovascular:      Rate and Rhythm: Bradycardia present.      Pulses: Normal pulses.   Pulmonary:      Effort: Pulmonary effort is normal.      Breath sounds: No wheezing or rales.   Abdominal:      General: Bowel sounds are normal.      Comments: obese   Musculoskeletal:         General: Normal range of motion.      Cervical back: Normal range of motion.      Right lower leg: No edema.      Left lower leg: Edema present.   Skin:     General: Skin is warm.      Capillary Refill: Capillary refill takes less than 2 seconds.   Neurological:      Mental Status: He is alert and oriented to person, place, and time.   Psychiatric:         Mood and Affect: Mood normal.         Behavior: Behavior  "normal.              CRANIAL NERVES     CN III, IV, VI   Pupils are equal, round, and reactive to light.       Significant Labs: A1C:   Recent Labs   Lab 08/04/23  1256   HGBA1C 5.6     ABGs: No results for input(s): "PH", "PCO2", "HCO3", "POCSATURATED", "BE", "TOTALHB", "COHB", "METHB", "O2HB", "POCFIO2", "PO2" in the last 48 hours.  Blood Culture: No results for input(s): "LABBLOO" in the last 48 hours.  CBC:   Recent Labs   Lab 08/13/23  1253   WBC 7.53   HGB 15.4   HCT 43.5        CMP:   Recent Labs   Lab 08/13/23  1253   *   K 4.8      CO2 19*      BUN 84*   CREATININE 4.6*   CALCIUM 9.0   PROT 7.4   ALBUMIN 4.5   BILITOT 0.7   ALKPHOS 63   AST 10   ALT 30   ANIONGAP 14     Lactic Acid: No results for input(s): "LACTATE" in the last 48 hours.  Lipase: No results for input(s): "LIPASE" in the last 48 hours.  Lipid Panel: No results for input(s): "CHOL", "HDL", "LDLCALC", "TRIG", "CHOLHDL" in the last 48 hours.  Magnesium:   Recent Labs   Lab 08/13/23  1253   MG 2.4     Troponin:   Recent Labs   Lab 08/13/23  1253   TROPONINI 0.007     TSH: No results for input(s): "TSH" in the last 4320 hours.  Urine Culture: No results for input(s): "LABURIN" in the last 48 hours.  Urine Studies: No results for input(s): "COLORU", "APPEARANCEUA", "PHUR", "SPECGRAV", "PROTEINUA", "GLUCUA", "KETONESU", "BILIRUBINUA", "OCCULTUA", "NITRITE", "UROBILINOGEN", "LEUKOCYTESUR", "RBCUA", "WBCUA", "BACTERIA", "SQUAMEPITHEL", "HYALINECASTS" in the last 48 hours.    Invalid input(s): "WRIGHTSUR"    Significant Imaging: I have reviewed all pertinent imaging results/findings within the past 24 hours.  "

## 2023-08-13 NOTE — H&P
[facility]  [facility] Medicine  History & Physical    Patient Name: Apollo Nguyen  MRN: [id number]  Patient Class: IP- Inpatient  Admission Date: 8/13/2023  Attending Physician: Shayy Murcia MD  Primary Care Provider: Emma Primary Doctor         Patient information was obtained from patient and ER records.     Subjective:     Principal Problem:MARCELA (acute kidney injury)    Chief Complaint:   Chief Complaint   Patient presents with    Shortness of Breath     Pt has hx of HIV, HTN, CKD  pt complains of SOB, intermittent dizziness that began on fri          HPI: Apollo Nguyen is a 62 y/o F with PMH of CKD 3, hyperlipidemia, HIV, hypertension, osteopenia, prostate disorder present with a month history of shortness of breath that is worse in the past few days. There is associated chest pain, few episode of diarrhea  He denies fever, chills, cough, dysuria, frequency.  Patient stated that he has been compliant with medication except weight using BiPAP at night because he does not like the way it made him feel.   In the ED heart rate 54, RR 24, BP 93/54.  Sodium 135 BUN/creatinine 84/4.6, BNP 16, troponin 0.007, wbc 7.53, H/H 15.4/43.5, CXR with no acute finding, admit[facility]ospital medicine      Past Medical History:   Diagnosis Date    MARCELA (acute kidney injury)     CKD (chronic kidney disease) stage 3, GFR 30-59 ml/min     High cholesterol     HIV disease     Hypertension     Hypocalcemia     Hypophosphatemia     Osteopenia     Prostate disorder     Proteinuria        No past surgical history on file.    Review of patient's allergies indicates:  No Known Allergies    No current facility-administered medications on file prior to encounter.     Current Outpatient Medications on File Prior to Encounter   Medication Sig    acetaminophen (TYLENOL) 500 MG tablet Take 500 mg by mouth every 6 (six) hours as needed for Pain.    aspirin (ECOTRIN) 81 MG EC tablet Take 81  mg by mouth once daily.    atorvastatin (LIPITOR) 10 MG tablet Take 10 mg by mouth.    ioznqykbc-qxyeanhp-hdcwqzw ala (BIKTARVY) -25 mg (25 kg or greater) Take 1 tablet by mouth once daily.    cholecalciferol, vitamin D3, 125 mcg (5,000 unit) Tab Take 5,000 Units by mouth once daily.    lisinopriL (PRINIVIL,ZESTRIL) 20 MG tablet Take 0.5 tablets (10 mg total) by mouth once daily.    loratadine (CLARITIN) 10 mg tablet Take 10 mg by mouth daily as needed.    metFORMIN (GLUCOPHAGE-XR) 500 MG ER 24hr tablet Take 500 mg by mouth every evening.    multivitamin (THERAGRAN) per tablet Take 1 tablet by mouth once daily.    omega-3s-dha-epa-fish oil 900-110-680 mg Cap Take 1 capsule by mouth once daily.    pantoprazole (PROTONIX) 20 MG tablet Take 20 mg by mouth once daily.    sildenafiL (VIAGRA) 50 MG tablet TAKE 1 TO 2 TABLETS BY MOUTH EVERY 72 HOURS    tamsulosin (FLOMAX) 0.4 mg Cap Take 0.4 mg by mouth once daily.    testosterone (ANDROGEL) 1 % (50 mg/5 gram) GlPk Place 5 g onto the skin once daily.     Family History    None       Tobacco Use    Smoking status: Former     Current packs/day: 0.00    Smokeless tobacco: Never   Substance and Sexual Activity    Alcohol use: No    Drug use: No     Comment: used in the past    Sexual activity: Not on file     Review of Systems   Constitutional:  Positive for appetite change. Negative for activity change, chills and fever.   Eyes:  Negative for photophobia and visual disturbance.   Respiratory:  Negative for cough and shortness of breath.    Gastrointestinal:  Positive for nausea and vomiting.   Endocrine: Negative for polyphagia and polyuria.   Genitourinary:  Negative for dysuria.   Neurological:  Negative for dizziness, tremors, weakness and headaches.   Psychiatric/Behavioral:  Negative for agitation and confusion.      Objective:     Vital Signs (Most Recent):  Temp: 98 °F (36.7 °C) (08/13/23 1153)  Pulse: (!) 54 (08/13/23 1332)  Resp: (!) 24  "(08/13/23 1356)  BP: (!) 93/54 (08/13/23 1332)  SpO2: (!) 94 % (08/13/23 1332) Vital Signs (24h Range):  Temp:  [98 °F (36.7 °C)] 98 °F (36.7 °C)  Pulse:  [54-60] 54  Resp:  [18-24] 24  SpO2:  [94 %-97 %] 94 %  BP: ()/(53-55) 93/54     Weight: 96.2 kg (212 lb)  Body mass index is 35.28 kg/m².     Physical Exam  Constitutional:       General: He is not in acute distress.     Appearance: He is obese. He is not ill-appearing or toxic-appearing.   HENT:      Head: Normocephalic and atraumatic.      Nose: Nose normal.   Eyes:      Pupils: Pupils are equal, round, and reactive to light.   Cardiovascular:      Rate and Rhythm: Bradycardia present.      Pulses: Normal pulses.   Pulmonary:      Effort: Pulmonary effort is normal.      Breath sounds: No wheezing or rales.   Abdominal:      General: Bowel sounds are normal.      Comments: obese   Musculoskeletal:         General: Normal range of motion.      Cervical back: Normal range of motion.      Right lower leg: No edema.      Left lower leg: Edema present.   Skin:     General: Skin is warm.      Capillary Refill: Capillary refill takes less than 2 seconds.   Neurological:      Mental Status: He is alert and oriented to person, place, and time.   Psychiatric:         Mood and Affect: Mood normal.         Behavior: Behavior normal.              CRANIAL NERVES     CN III, IV, VI   Pupils are equal, round, and reactive to light.       Significant Labs: A1C:   Recent Labs   Lab 08/04/23  1256   HGBA1C 5.6     ABGs: No results for input(s): "PH", "PCO2", "HCO3", "POCSATURATED", "BE", "TOTALHB", "COHB", "METHB", "O2HB", "POCFIO2", "PO2" in the last 48 hours.  Blood Culture: No results for input(s): "LABBLOO" in the last 48 hours.  CBC:   Recent Labs   Lab 08/13/23  1253   WBC 7.53   HGB 15.4   HCT 43.5        CMP:   Recent Labs   Lab 08/13/23  1253   *   K 4.8      CO2 19*      BUN 84*   CREATININE 4.6*   CALCIUM 9.0   PROT 7.4   ALBUMIN 4.5 " "  BILITOT 0.7   ALKPHOS 63   AST 10   ALT 30   ANIONGAP 14     Lactic Acid: No results for input(s): "LACTATE" in the last 48 hours.  Lipase: No results for input(s): "LIPASE" in the last 48 hours.  Lipid Panel: No results for input(s): "CHOL", "HDL", "LDLCALC", "TRIG", "CHOLHDL" in the last 48 hours.  Magnesium:   Recent Labs   Lab 08/13/23  1253   MG 2.4     Troponin:   Recent Labs   Lab 08/13/23  1253   TROPONINI 0.007     TSH: No results for input(s): "TSH" in the last 4320 hours.  Urine Culture: No results for input(s): "LABURIN" in the last 48 hours.  Urine Studies: No results for input(s): "COLORU", "APPEARANCEUA", "PHUR", "SPECGRAV", "PROTEINUA", "GLUCUA", "KETONESU", "BILIRUBINUA", "OCCULTUA", "NITRITE", "UROBILINOGEN", "LEUKOCYTESUR", "RBCUA", "WBCUA", "BACTERIA", "SQUAMEPITHEL", "HYALINECASTS" in the last 48 hours.    Invalid input(s): "WRIGHTSUR"    Significant Imaging: I have reviewed all pertinent imaging results/findings within the past 24 hours.    Assessment/Plan:     * MARCELA (acute kidney injury)  Patient with acute kidney injury/acute renal failure likely due to pre-renal azotemia due to dehydration MARCELA is currently worsening. Baseline creatinine 1.4 - Labs reviewed- Renal function/electrolytes with Estimated Creatinine Clearance: 18 mL/min (A) (based on SCr of 4.6 mg/dL (H)). according to latest data. Monitor urine output and serial BMP and adjust therapy as needed. Avoid nephrotoxins and renally dose meds for GFR listed above.    GERD (gastroesophageal reflux disease)  Continue PPI      Hypotension  Low Bp on admit  Started on IVF       Hypertension  On home lisinopril-hold due to MARCELA and hypotension on admit      HIV disease  This patient in known to have AIDS (from CD4 count has been less than 200). Labs reviewed- No results found for: "CD4", No results found for: "HIVDNAPCR". Patient is on HAART. Will continue HAART. Prophylaxis was not indicated at this time- . Continue to monitor routine " labs. Last CD4 count was   Lab Results   Component Value Date    ABSOLUTECD4 668 08/04/2023       We will not consult Infectious disease at this time. Other HIV-associated diseases are not present.    CD4 16.5 on 8/4/2023  Viral load - not detected    PATRIC (obstructive sleep apnea)  Stated he does not use home CPAP because he does not like do it makes him feel        VTE Risk Mitigation (From admission, onward)         Ordered     heparin (porcine) injection 5,000 Units  Every 8 hours         08/13/23 1336     IP VTE HIGH RISK PATIENT  Once         08/13/23 1336     Place sequential compression device  Until discontinued         08/13/23 1336                           Shayy Murcia MD  Department of Hospital Medicine  [place] - Emergency Dept

## 2023-08-13 NOTE — HPI
Cassi Nguyenan Antonio is a 64 y/o F with PMH of CKD 3, hyperlipidemia, HIV, hypertension, osteopenia, prostate disorder present with a month history of shortness of breath that is worse in the past few days. There is associated chest pain, few episode of diarrhea  He denies fever, chills, cough, dysuria, frequency.  Patient stated that he has been compliant with medication except weight using BiPAP at night because he does not like the way it made him feel.   In the ED heart rate 54, RR 24, BP 93/54.  Sodium 135 BUN/creatinine 84/4.6, BNP 16, troponin 0.007, wbc 7.53, H/H 15.4/43.5, CXR with no acute finding, admitdhospital medicine

## 2023-08-14 VITALS
OXYGEN SATURATION: 100 % | RESPIRATION RATE: 18 BRPM | WEIGHT: 206 LBS | DIASTOLIC BLOOD PRESSURE: 68 MMHG | TEMPERATURE: 98 F | HEIGHT: 65 IN | SYSTOLIC BLOOD PRESSURE: 132 MMHG | HEART RATE: 49 BPM | BODY MASS INDEX: 34.32 KG/M2

## 2023-08-14 LAB
ALBUMIN SERPL BCP-MCNC: 3.8 G/DL (ref 3.5–5.2)
ALP SERPL-CCNC: 64 U/L (ref 55–135)
ALT SERPL W/O P-5'-P-CCNC: 25 U/L (ref 10–44)
ANION GAP SERPL CALC-SCNC: 10 MMOL/L (ref 8–16)
ANION GAP SERPL CALC-SCNC: 7 MMOL/L (ref 8–16)
ASCENDING AORTA: 2.88 CM
AST SERPL-CCNC: 11 U/L (ref 10–40)
AV INDEX (PROSTH): 0.68
AV MEAN GRADIENT: 4 MMHG
AV PEAK GRADIENT: 8 MMHG
AV VALVE AREA BY VELOCITY RATIO: 2.59 CM²
AV VALVE AREA: 2.91 CM²
AV VELOCITY RATIO: 0.61
BASOPHILS # BLD AUTO: 0.01 K/UL (ref 0–0.2)
BASOPHILS NFR BLD: 0.2 % (ref 0–1.9)
BILIRUB SERPL-MCNC: 0.4 MG/DL (ref 0.1–1)
BSA FOR ECHO PROCEDURE: 2.07 M2
BUN SERPL-MCNC: 46 MG/DL (ref 8–23)
BUN SERPL-MCNC: 62 MG/DL (ref 8–23)
CALCIUM SERPL-MCNC: 8.7 MG/DL (ref 8.7–10.5)
CALCIUM SERPL-MCNC: 9 MG/DL (ref 8.7–10.5)
CHLORIDE SERPL-SCNC: 105 MMOL/L (ref 95–110)
CHLORIDE SERPL-SCNC: 106 MMOL/L (ref 95–110)
CO2 SERPL-SCNC: 21 MMOL/L (ref 23–29)
CO2 SERPL-SCNC: 24 MMOL/L (ref 23–29)
CREAT SERPL-MCNC: 1.7 MG/DL (ref 0.5–1.4)
CREAT SERPL-MCNC: 2.3 MG/DL (ref 0.5–1.4)
CV ECHO LV RWT: 0.37 CM
DIFFERENTIAL METHOD: NORMAL
DOP CALC AO PEAK VEL: 1.4 M/S
DOP CALC AO VTI: 28.7 CM
DOP CALC LVOT AREA: 4.3 CM2
DOP CALC LVOT DIAMETER: 2.33 CM
DOP CALC LVOT PEAK VEL: 0.85 M/S
DOP CALC LVOT STROKE VOLUME: 83.53 CM3
DOP CALC MV VTI: 28.8 CM
DOP CALCLVOT PEAK VEL VTI: 19.6 CM
E WAVE DECELERATION TIME: 163.71 MSEC
E/A RATIO: 2.97
E/E' RATIO: 7.48 M/S
ECHO LV POSTERIOR WALL: 0.97 CM (ref 0.6–1.1)
EJECTION FRACTION: 55 %
EOSINOPHIL # BLD AUTO: 0.2 K/UL (ref 0–0.5)
EOSINOPHIL NFR BLD: 3.6 % (ref 0–8)
ERYTHROCYTE [DISTWIDTH] IN BLOOD BY AUTOMATED COUNT: 13.1 % (ref 11.5–14.5)
EST. GFR  (NO RACE VARIABLE): 32 ML/MIN/1.73 M^2
EST. GFR  (NO RACE VARIABLE): 45 ML/MIN/1.73 M^2
FRACTIONAL SHORTENING: 30 % (ref 28–44)
GLUCOSE SERPL-MCNC: 101 MG/DL (ref 70–110)
GLUCOSE SERPL-MCNC: 109 MG/DL (ref 70–110)
HCT VFR BLD AUTO: 41.5 % (ref 40–54)
HGB BLD-MCNC: 14.3 G/DL (ref 14–18)
IMM GRANULOCYTES # BLD AUTO: 0.01 K/UL (ref 0–0.04)
IMM GRANULOCYTES NFR BLD AUTO: 0.2 % (ref 0–0.5)
INTERVENTRICULAR SEPTUM: 1.18 CM (ref 0.6–1.1)
IVC DIAMETER: 1.51 CM
LA MAJOR: 5.29 CM
LA MINOR: 5.7 CM
LA WIDTH: 3.6 CM
LEFT ATRIUM SIZE: 3.81 CM
LEFT ATRIUM VOLUME INDEX MOD: 25.4 ML/M2
LEFT ATRIUM VOLUME INDEX: 32 ML/M2
LEFT ATRIUM VOLUME MOD: 50.78 CM3
LEFT ATRIUM VOLUME: 63.97 CM3
LEFT INTERNAL DIMENSION IN SYSTOLE: 3.7 CM (ref 2.1–4)
LEFT VENTRICLE DIASTOLIC VOLUME INDEX: 67.85 ML/M2
LEFT VENTRICLE DIASTOLIC VOLUME: 135.69 ML
LEFT VENTRICLE MASS INDEX: 111 G/M2
LEFT VENTRICLE SYSTOLIC VOLUME INDEX: 29 ML/M2
LEFT VENTRICLE SYSTOLIC VOLUME: 58.08 ML
LEFT VENTRICULAR INTERNAL DIMENSION IN DIASTOLE: 5.31 CM (ref 3.5–6)
LEFT VENTRICULAR MASS: 221.45 G
LV LATERAL E/E' RATIO: 6.14 M/S
LV SEPTAL E/E' RATIO: 9.56 M/S
LVOT MG: 1.68 MMHG
LVOT MV: 0.62 CM/S
LYMPHOCYTES # BLD AUTO: 2.4 K/UL (ref 1–4.8)
LYMPHOCYTES NFR BLD: 43.1 % (ref 18–48)
MAGNESIUM SERPL-MCNC: 2.3 MG/DL (ref 1.6–2.6)
MCH RBC QN AUTO: 30.8 PG (ref 27–31)
MCHC RBC AUTO-ENTMCNC: 34.5 G/DL (ref 32–36)
MCV RBC AUTO: 89 FL (ref 82–98)
MONOCYTES # BLD AUTO: 0.6 K/UL (ref 0.3–1)
MONOCYTES NFR BLD: 10.4 % (ref 4–15)
MV MEAN GRADIENT: 1 MMHG
MV PEAK A VEL: 0.29 M/S
MV PEAK E VEL: 0.86 M/S
MV PEAK GRADIENT: 2 MMHG
MV STENOSIS PRESSURE HALF TIME: 47.48 MS
MV VALVE AREA BY CONTINUITY EQUATION: 2.9 CM2
MV VALVE AREA P 1/2 METHOD: 4.63 CM2
NEUTROPHILS # BLD AUTO: 2.3 K/UL (ref 1.8–7.7)
NEUTROPHILS NFR BLD: 42.5 % (ref 38–73)
NRBC BLD-RTO: 0 /100 WBC
PHOSPHATE SERPL-MCNC: 3.1 MG/DL (ref 2.7–4.5)
PISA MRMAX VEL: 1.78 M/S
PISA TR MAX VEL: 1.71 M/S
PLATELET # BLD AUTO: 172 K/UL (ref 150–450)
PMV BLD AUTO: 11 FL (ref 9.2–12.9)
POCT GLUCOSE: 87 MG/DL (ref 70–110)
POCT GLUCOSE: 96 MG/DL (ref 70–110)
POCT GLUCOSE: 96 MG/DL (ref 70–110)
POTASSIUM SERPL-SCNC: 4.8 MMOL/L (ref 3.5–5.1)
POTASSIUM SERPL-SCNC: 5 MMOL/L (ref 3.5–5.1)
PROT SERPL-MCNC: 6.9 G/DL (ref 6–8.4)
PV MV: 0.73 M/S
PV PEAK GRADIENT: 5 MMHG
PV PEAK VELOCITY: 1.09 M/S
RA MAJOR: 4.79 CM
RA PRESSURE ESTIMATED: 3 MMHG
RA WIDTH: 4.1 CM
RBC # BLD AUTO: 4.64 M/UL (ref 4.6–6.2)
RIGHT VENTRICULAR END-DIASTOLIC DIMENSION: 4.5 CM
RV TB RVSP: 5 MMHG
RV TISSUE DOPPLER FREE WALL SYSTOLIC VELOCITY 1 (APICAL 4 CHAMBER VIEW): 10.24 CM/S
SODIUM SERPL-SCNC: 136 MMOL/L (ref 136–145)
SODIUM SERPL-SCNC: 137 MMOL/L (ref 136–145)
STJ: 2.98 CM
TDI LATERAL: 0.14 M/S
TDI SEPTAL: 0.09 M/S
TDI: 0.12 M/S
TR MAX PG: 12 MMHG
TRICUSPID ANNULAR PLANE SYSTOLIC EXCURSION: 1.37 CM
TV REST PULMONARY ARTERY PRESSURE: 15 MMHG
WBC # BLD AUTO: 5.48 K/UL (ref 3.9–12.7)
Z-SCORE OF LEFT VENTRICULAR DIMENSION IN END DIASTOLE: -0.93
Z-SCORE OF LEFT VENTRICULAR DIMENSION IN END SYSTOLE: 0.26

## 2023-08-14 PROCEDURE — 85025 COMPLETE CBC W/AUTO DIFF WBC: CPT

## 2023-08-14 PROCEDURE — 36415 COLL VENOUS BLD VENIPUNCTURE: CPT | Performed by: FAMILY MEDICINE

## 2023-08-14 PROCEDURE — 80053 COMPREHEN METABOLIC PANEL: CPT

## 2023-08-14 PROCEDURE — 63600175 PHARM REV CODE 636 W HCPCS: Performed by: FAMILY MEDICINE

## 2023-08-14 PROCEDURE — 97161 PT EVAL LOW COMPLEX 20 MIN: CPT

## 2023-08-14 PROCEDURE — A4216 STERILE WATER/SALINE, 10 ML: HCPCS | Performed by: FAMILY MEDICINE

## 2023-08-14 PROCEDURE — 63600175 PHARM REV CODE 636 W HCPCS

## 2023-08-14 PROCEDURE — 80048 BASIC METABOLIC PNL TOTAL CA: CPT | Mod: XB | Performed by: FAMILY MEDICINE

## 2023-08-14 PROCEDURE — 84100 ASSAY OF PHOSPHORUS: CPT

## 2023-08-14 PROCEDURE — 25000003 PHARM REV CODE 250: Performed by: FAMILY MEDICINE

## 2023-08-14 PROCEDURE — 36415 COLL VENOUS BLD VENIPUNCTURE: CPT

## 2023-08-14 PROCEDURE — 97530 THERAPEUTIC ACTIVITIES: CPT

## 2023-08-14 PROCEDURE — 83735 ASSAY OF MAGNESIUM: CPT

## 2023-08-14 PROCEDURE — 97165 OT EVAL LOW COMPLEX 30 MIN: CPT

## 2023-08-14 PROCEDURE — 25000003 PHARM REV CODE 250

## 2023-08-14 RX ADMIN — ATORVASTATIN CALCIUM 10 MG: 10 TABLET, FILM COATED ORAL at 08:08

## 2023-08-14 RX ADMIN — SODIUM CHLORIDE 10 ML: 9 INJECTION, SOLUTION INTRAMUSCULAR; INTRAVENOUS; SUBCUTANEOUS at 02:08

## 2023-08-14 RX ADMIN — PANTOPRAZOLE SODIUM 40 MG: 40 TABLET, DELAYED RELEASE ORAL at 08:08

## 2023-08-14 RX ADMIN — SODIUM CHLORIDE, POTASSIUM CHLORIDE, SODIUM LACTATE AND CALCIUM CHLORIDE: 600; 310; 30; 20 INJECTION, SOLUTION INTRAVENOUS at 01:08

## 2023-08-14 RX ADMIN — ASPIRIN 81 MG: 81 TABLET, COATED ORAL at 08:08

## 2023-08-14 RX ADMIN — HEPARIN SODIUM 5000 UNITS: 5000 INJECTION INTRAVENOUS; SUBCUTANEOUS at 05:08

## 2023-08-14 RX ADMIN — LIDOCAINE 1 PATCH: 50 PATCH CUTANEOUS at 08:08

## 2023-08-14 RX ADMIN — SODIUM CHLORIDE 10 ML: 9 INJECTION, SOLUTION INTRAMUSCULAR; INTRAVENOUS; SUBCUTANEOUS at 06:08

## 2023-08-14 RX ADMIN — TAMSULOSIN HYDROCHLORIDE 0.4 MG: 0.4 CAPSULE ORAL at 08:08

## 2023-08-14 RX ADMIN — DOCUSATE SODIUM AND SENNOSIDES 1 TABLET: 8.6; 5 TABLET, FILM COATED ORAL at 08:08

## 2023-08-14 RX ADMIN — POLYETHYLENE GLYCOL 3350 17 G: 17 POWDER, FOR SOLUTION ORAL at 08:08

## 2023-08-14 RX ADMIN — SODIUM CHLORIDE, POTASSIUM CHLORIDE, SODIUM LACTATE AND CALCIUM CHLORIDE: 600; 310; 30; 20 INJECTION, SOLUTION INTRAVENOUS at 08:08

## 2023-08-14 RX ADMIN — HEPARIN SODIUM 5000 UNITS: 5000 INJECTION INTRAVENOUS; SUBCUTANEOUS at 03:08

## 2023-08-14 NOTE — PROGRESS NOTES
[facility] Medicine  Progress Note    Patient Name: Apollo Nguyen  MRN: [id number]  Patient Class: IP- Inpatient   Admission Date: 8/13/2023  Length of Stay: 1 days  Attending Physician: Shayy Murcia*  Primary Care Provider: Emma, Primary Doctor        Subjective:     Principal Problem:MARCELA (acute kidney injury)        HPI:  Apollo Nguyen is a 62 y/o F with PMH of CKD 3, hyperlipidemia, HIV, hypertension, osteopenia, prostate disorder present with a month history of shortness of breath that is worse in the past few days. There is associated chest pain, few episode of diarrhea  He denies fever, chills, cough, dysuria, frequency.  Patient stated that he has been compliant with medication except weight using BiPAP at night because he does not like the way it made him feel.   In the ED heart rate 54, RR 24, BP 93/54.  Sodium 135 BUN/creatinine 84/4.6, BNP 16, troponin 0.007, wbc 7.53, H/H 15.4/43.5, CXR with no acute finding, admitdhospital medicine      Overview/Hospital Course:  No notes on file    Interval History: awake and alert, feels better, requesting to be discharge as he has 2 appt tomorrow with PCP and for his colon cancer     MARCELA improving - continue IVF- repeat BMP at 2 and if improved vandana discharge  Quit crack cocaine for 20 years ago in 1998 (used for 4 years)    PT/OT to assess fatigue- appreciate recs    Used  service : [facility] [id number]  IN home today    Review of Systems   Constitutional:  Negative for activity change, appetite change, chills and fever.   Eyes:  Negative for photophobia and visual disturbance.   Respiratory:  Negative for cough and shortness of breath.    Gastrointestinal:  Negative for nausea and vomiting.   Endocrine: Negative for polyphagia and polyuria.   Genitourinary:  Negative for dysuria.   Neurological:  Negative for dizziness, tremors, weakness and headaches.   Psychiatric/Behavioral:  Negative for agitation  "and confusion.      Objective:     Vital Signs (Most Recent):  Temp: 98.1 °F (36.7 °C) (08/14/23 0742)  Pulse: (!) 50 (08/14/23 0742)  Resp: 17 (08/14/23 0742)  BP: (!) 141/65 (08/14/23 0742)  SpO2: 99 % (08/14/23 0742) Vital Signs (24h Range):  Temp:  [97.5 °F (36.4 °C)-98.7 °F (37.1 °C)] 98.1 °F (36.7 °C)  Pulse:  [49-60] 50  Resp:  [16-30] 17  SpO2:  [94 %-100 %] 99 %  BP: ()/(51-68) 141/65     Weight: 93.8 kg (206 lb 12.7 oz)  Body mass index is 34.41 kg/m².    Intake/Output Summary (Last 24 hours) at 8/14/2023 0913  Last data filed at 8/14/2023 0549  Gross per 24 hour   Intake --   Output 1200 ml   Net -1200 ml         Physical Exam  Constitutional:       General: He is not in acute distress.     Appearance: He is obese. He is not ill-appearing or toxic-appearing.   HENT:      Head: Normocephalic and atraumatic.   Cardiovascular:      Rate and Rhythm: Bradycardia present.      Pulses: Normal pulses.   Pulmonary:      Effort: Pulmonary effort is normal.      Breath sounds: No wheezing or rales.   Abdominal:      General: Bowel sounds are normal.      Comments: obese   Musculoskeletal:         General: Normal range of motion.      Cervical back: Normal range of motion.      Right lower leg: No edema.      Left lower leg: Edema present.   Skin:     General: Skin is warm.      Capillary Refill: Capillary refill takes less than 2 seconds.   Neurological:      Mental Status: He is alert and oriented to person, place, and time.   Psychiatric:         Mood and Affect: Mood normal.         Behavior: Behavior normal.             Significant Labs: A1C:   Recent Labs   Lab 08/04/23  1256   HGBA1C 5.6     ABGs: No results for input(s): "PH", "PCO2", "HCO3", "POCSATURATED", "BE", "TOTALHB", "COHB", "METHB", "O2HB", "POCFIO2", "PO2" in the last 48 hours.  Blood Culture: No results for input(s): "LABBLOO" in the last 48 hours.  BMP:   Recent Labs   Lab 08/14/23  0428         K 4.8      CO2 21*   BUN " "62*   CREATININE 2.3*   CALCIUM 8.7   MG 2.3     CBC:   Recent Labs   Lab 08/13/23  1253 08/14/23  0428   WBC 7.53 5.48   HGB 15.4 14.3   HCT 43.5 41.5    172     Lactic Acid: No results for input(s): "LACTATE" in the last 48 hours.  Lipase: No results for input(s): "LIPASE" in the last 48 hours.  Lipid Panel: No results for input(s): "CHOL", "HDL", "LDLCALC", "TRIG", "CHOLHDL" in the last 48 hours.  Magnesium:   Recent Labs   Lab 08/13/23  1253 08/14/23  0428   MG 2.4 2.3     Troponin:   Recent Labs   Lab 08/13/23  1253   TROPONINI 0.007     TSH: No results for input(s): "TSH" in the last 4320 hours.  Urine Culture: No results for input(s): "LABURIN" in the last 48 hours.  Urine Studies:   Recent Labs   Lab 08/13/23  1348   COLORU Yellow   APPEARANCEUA Clear   PHUR 5.0   SPECGRAV 1.020   PROTEINUA Trace*   GLUCUA Negative   KETONESU Negative   BILIRUBINUA Negative   OCCULTUA Negative   NITRITE Negative   UROBILINOGEN Negative   LEUKOCYTESUR Negative       Significant Imaging: I have reviewed all pertinent imaging results/findings within the past 24 hours.      Assessment/Plan:      * MARCELA (acute kidney injury)  Patient with acute kidney injury/acute renal failure likely due to pre-renal azotemia due to dehydration MARCELA is currently worsening. Baseline creatinine 1.4 - Labs reviewed- Renal function/electrolytes with Estimated Creatinine Clearance: 48 mL/min (A) (based on SCr of 1.7 mg/dL (H)). according to latest data. Monitor urine output and serial BMP and adjust therapy as needed. Avoid nephrotoxins and renally dose meds for GFR listed above.    GERD (gastroesophageal reflux disease)  Continue PPI      Class 1 obesity in adult  Body mass index is 34.28 kg/m². Morbid obesity complicates all aspects of disease management from diagnostic modalities to treatment. Weight loss encouraged and health benefits explained to patient.         Hypotension  Low Bp on admit  Started on IVF   Improved      Hypertension  On " "home lisinopril-hold due to MARCELA and hypotension on admit  Resume at discharge      HIV disease  This patient in known to have AIDS (from CD4 count has been less than 200). Labs reviewed- No results found for: "CD4", No results found for: "HIVDNAPCR". Patient is on HAART. Will continue HAART. Prophylaxis was not indicated at this time- . Continue to monitor routine labs. Last CD4 count was   Lab Results   Component Value Date    ABSOLUTECD4 668 08/04/2023       We will not consult Infectious disease at this time. Other HIV-associated diseases are not present.    CD4 16.5 on 8/4/2023  Viral load - not detected    APTRIC (obstructive sleep apnea)  Stated he does not use home CPAP because he does not like do it makes him feel        VTE Risk Mitigation (From admission, onward)         Ordered     heparin (porcine) injection 5,000 Units  Every 8 hours         08/13/23 1336     IP VTE HIGH RISK PATIENT  Once         08/13/23 1336     Place sequential compression device  Until discontinued         08/13/23 1336                Discharge Planning   ANTHONY: 8/14/2023     Code Status: Full Code   Is the patient medically ready for discharge?:     Reason for patient still in hospital (select all that apply): Pending disposition                 Shayy Murcia MD  Department of Hospital Medicine   [facility] S[facility]  "

## 2023-08-14 NOTE — PLAN OF CARE
Recommendations    Recommendations:   1. Continue cardiac diet.   2. Monitor need for ONS.   3. Monitor PO intake, weight changes, and labs.   4. RD to follow.    Goals:   Pt to meet 50-75% assessed needs through PO intake by RD follow up.  Nutrition Goal Status: new  Communication of RD Recs: other (comment) (Plan of care)

## 2023-08-14 NOTE — ASSESSMENT & PLAN NOTE
"This patient in known to have AIDS (from CD4 count has been less than 200). Labs reviewed- No results found for: "CD4", No results found for: "HIVDNAPCR". Patient is on HAART. Will continue HAART. Prophylaxis was not indicated at this time- . Continue to monitor routine labs. Last CD4 count was   Lab Results   Component Value Date    ABSOLUTECD4 668 08/04/2023       We will not consult Infectious disease at this time. Other HIV-associated diseases are not present.    CD4 16.5 on 8/4/2023  Viral load - not detected  " 76

## 2023-08-14 NOTE — PLAN OF CARE
0915  Patient resting quietly in bed when CM participated in SIBR with Dr Talbert, CM Mari, & nurse Nathaly.       [facility]  Initial Discharge Assessment       Primary Care Provider: No, Primary Doctor    Admission Diagnosis: HIV disease [B20]  Dyspnea [R06.00]  MARCELA (acute kidney injury) [N17.9]    Admission Date: 8/13/2023  Expected Discharge Date: 8/14/2023    Payor: BLUE CROSS BLUE SHIELD / Plan: [place]BS [place] PPO / Product Type: PPO /     Extended Emergency Contact Information  Primary Emergency Contact: Azra Traylor   [place]  Mobile Phone: [phone]  Relation: Spouse    Discharge Plan A: Home with family  Discharge Plan B: Home Health      [facility] - [place], [place] - [address]  [facility] [address]  Phone: [phone] Fax: [phone]    [facility] [place], [place] - [address]  [address]  [place] [address]  Phone: [phone] Fax: [phone]      Initial Assessment (most recent)       Adult Discharge Assessment - 08/14/23 1050          Discharge Assessment    Assessment Type Discharge Planning Assessment     Confirmed/corrected address, phone number and insurance Yes     Confirmed Demographics Correct on Facesheet     Source of Information patient     Communicated ANTHONY with patient/caregiver Yes     People in Home spouse;child(melissa), dependent     Do you expect to return to your current living situation? Yes     Do you have help at home or someone to help you manage your care at home? Yes     Prior to hospitilization cognitive status: Alert/Oriented     Current cognitive status: Alert/Oriented     Equipment Currently Used at Home none     Readmission within 30 days? No     Patient currently being followed by outpatient case management? No     Do you currently have service(s) that help you manage your care at home? No     Do you take prescription medications? Yes     Do you have prescription coverage? Yes     Do you  have any problems affording any of your prescribed medications? No     Is the patient taking medications as prescribed? yes     How do you get to doctors appointments? car, drives self     Are you on dialysis? No     Do you take coumadin? No     Discharge Plan A Home with family     Discharge Plan B Home Health     DME Needed Upon Discharge  none     Discharge Plan discussed with: Patient        Physical Activity    On average, how many days per week do you engage in moderate to strenuous exercise (like a brisk walk)? 2 days (P)      On average, how many minutes do you engage in exercise at this level? 40 min (P)         Financial Resource Strain    How hard is it for you to pay for the very basics like food, housing, medical care, and heating? Somewhat hard (P)         Housing Stability    In the last 12 months, was there a time when you were not able to pay the mortgage or rent on time? No (P)      In the last 12 months, was there a time when you did not have a steady place to sleep or slept in a shelter (including now)? No (P)         Transportation Needs    In the past 12 months, has lack of transportation kept you from medical appointments or from getting medications? No (P)      In the past 12 months, has lack of transportation kept you from meetings, work, or from getting things needed for daily living? No (P)         Food Insecurity    Within the past 12 months, you worried that your food would run out before you got the money to buy more. Never true (P)      Within the past 12 months, the food you bought just didn't last and you didn't have money to get more. Never true (P)         Stress    Do you feel stress - tense, restless, nervous, or anxious, or unable to sleep at night because your mind is troubled all the time - these days? Not at all (P)         Social Connections    In a typical week, how many times do you talk on the phone with family, friends, or neighbors? More than three times a week (P)       How often do you get together with friends or relatives? More than three times a week (P)      How often do you attend Jainism or Scientologist services? Never (P)      Do you belong to any clubs or organizations such as Jainism groups, unions, fraternal or athletic groups, or school groups? No (P)      How often do you attend meetings of the clubs or organizations you belong to? Never (P)      Are you , , , , never , or living with a partner?  (P)         Alcohol Use    Q1: How often do you have a drink containing alcohol? Never (P)      Q2: How many drinks containing alcohol do you have on a typical day when you are drinking? Patient does not drink (P)      Q3: How often do you have six or more drinks on one occasion? Never (P)                       1050  Patient resting quietly in bed when CM rounded. No family present. Discharge planning assessment information obtained from the pt with assistance of Tongan speaking  Rich (#[id number]). Patient was admitted with MARCELA (BUN 84 & cr 4.6).    Patient lives with his spouse, Azra Traylor ([phone]), is independent of all ADLs, & denied the need for assistance with transportation at time of discharge. Pt requested to be discharged today because he has 2 very important MD appts tomorrow.     Message sent to the schedulers requesting a hospital follow up appointment with Dr Arielle Chowdhury. Awaiting response.    CM updated patient's whiteboard with CM name & contact information.     1400  CM was informed by  Karin of a hospfu appt scheduled for the patient with Dr Chowdhury on 8/25/2023 at 0900. Information added to the patient's discharge paperwork.     1630  DC order noted. Patient resting quietly in bed when CM rounded via [employer]. CM informed the pt of the discharge order with assistance of Tongan speaking  Ryan (#[id number]). No family present. Patient in agreement with plan to discharge home  today, denied the need for assistance with transportation at time of discharge, & verbalized understanding regarding the hospital follow up appointment with Dr Chowdhury.     Message sent to nurse Nathaly & virtual nurse Oren informing that the pt is cleared to discharge.       Will continue to follow.

## 2023-08-14 NOTE — CONSULTS
NEPHROLOGY CONSULT NOTE    HPI & INTERVAL HISTORY:    Past Medical History:   Diagnosis Date    MARCELA (acute kidney injury)     CKD (chronic kidney disease) stage 3, GFR 30-59 ml/min     High cholesterol     HIV disease     Hypertension     Hypocalcemia     Hypophosphatemia     Osteopenia     Prostate disorder     Proteinuria       No past surgical history on file.   Review of patient's allergies indicates:  No Known Allergies   Medications Prior to Admission   Medication Sig Dispense Refill Last Dose    acetaminophen (TYLENOL) 500 MG tablet Take 500 mg by mouth every 6 (six) hours as needed for Pain.   Past Week    aspirin (ECOTRIN) 81 MG EC tablet Take 81 mg by mouth every evening.   8/12/2023    atorvastatin (LIPITOR) 10 MG tablet Take 10 mg by mouth.   8/12/2023    ljlgtksss-xpodznep-xclivnc ala (BIKTARVY) -25 mg (25 kg or greater) Take 1 tablet by mouth once daily.   8/13/2023    lisinopriL (PRINIVIL,ZESTRIL) 20 MG tablet Take 0.5 tablets (10 mg total) by mouth once daily.   8/12/2023    metFORMIN (GLUCOPHAGE-XR) 500 MG ER 24hr tablet Take 500 mg by mouth every evening.   8/12/2023    omega-3s-dha-epa-fish oil 900-110-680 mg Cap Take 1 capsule by mouth once daily.   8/12/2023    pantoprazole (PROTONIX) 20 MG tablet Take 20 mg by mouth once daily.   8/12/2023    tamsulosin (FLOMAX) 0.4 mg Cap Take 0.4 mg by mouth every evening.   8/12/2023    testosterone (ANDROGEL) 1 % (50 mg/5 gram) GlPk Place 5 g onto the skin once daily.   8/13/2023    cholecalciferol, vitamin D3, 125 mcg (5,000 unit) Tab Take 5,000 Units by mouth once daily.   Unknown    loratadine (CLARITIN) 10 mg tablet Take 10 mg by mouth daily as needed.       multivitamin (THERAGRAN) per tablet Take 1 tablet by mouth once daily.       sildenafiL (VIAGRA) 50 MG tablet TAKE 1 TO 2 TABLETS BY MOUTH EVERY 72 HOURS   Unknown       Social History     Socioeconomic History    Marital status:    Tobacco Use    Smoking status: Former     Current  "packs/day: 0.00    Smokeless tobacco: Never   Substance and Sexual Activity    Alcohol use: No    Drug use: No     Comment: used in the past        MEDS   aspirin  81 mg Oral Daily    atorvastatin  10 mg Oral Daily    heparin (porcine)  5,000 Units Subcutaneous Q8H    pantoprazole  40 mg Oral Daily    polyethylene glycol  17 g Oral Daily    senna-docusate 8.6-50 mg  1 tablet Oral BID    sodium chloride 0.9%  10 mL Intravenous Q8H    tamsulosin  0.4 mg Oral Daily             CONTINOUS INFUSIONS:      Intake/Output Summary (Last 24 hours) at 8/14/2023 1126  Last data filed at 8/14/2023 0549  Gross per 24 hour   Intake --   Output 1200 ml   Net -1200 ml        HEMODYNAMICS:    Temp:  [97.5 °F (36.4 °C)-98.7 °F (37.1 °C)] 97.8 °F (36.6 °C)  Pulse:  [46-60] 46  Resp:  [16-30] 18  SpO2:  [94 %-100 %] 98 %  BP: ()/(51-68) 139/64   General :   Admit with SOB, dizziness,   reports diarrhea at least 5 times on 8/9/23,  abdominal discomfort, poor intake,  weight loss, decreased urine output,   was working outside 8/10- 8/12.  Was not taking ozempic recently.  No fever  No chills  Cardiology : pulse 60  Pulmonary :diminished breath sounds   Abdomen : soft  Extremities:no edema   Skin:   LABS   Lab Results   Component Value Date    WBC 5.48 08/14/2023    HGB 14.3 08/14/2023    HCT 41.5 08/14/2023    MCV 89 08/14/2023     08/14/2023        Recent Labs   Lab 08/14/23  0428      CALCIUM 8.7   ALBUMIN 3.8   PROT 6.9      K 4.8   CO2 21*      BUN 62*   CREATININE 2.3*   ALKPHOS 64   ALT 25   AST 11   BILITOT 0.4      Lab Results   Component Value Date    CALCIUM 8.7 08/14/2023    PHOS 3.1 08/14/2023      No results found for: "IRON", "TIBC", "FERRITIN"     ABG  No results for input(s): "PH", "PO2", "PCO2", "HCO3", "BE" in the last 168 hours.      IMAGING:  CXR    ASSESSMENT / PLAN  MARCELA/ CKD 3  History HIV, HTN, Diabetes mellitus  UO 1200 cc  Creatinine 1.4 on 8/6/23  US -  Right: Measures 11.3 cm.  " Normal cortical thickness and echogenicity.  No focal lesions.  No hydronephrosis.  Left: Measures 12.6 cm.  Normal cortical thickness and echogenicity.  There are 2 simple cysts measuring up to 1.2 cm. No hydronephrosis.  Urinary bladder: Unremarkable.  UA Na < 20  UA protein trace  Prerenal   Creatinine 4.6- 2.3  Azotemia   BUN 62  Metabolic acidosis  Metabolic bone disease  Albumin 3.8  Hb 14.3  Blood pressure 139/64  BNP 16  CXR-  The lungs are clear.  There is no pneumothorax or pleural fluid.  The cardiac silhouette is not enlarged.  The osseous structures demonstrate degenerative change.   Weight daily   I and O  Avoid nephrotoxic agents, hypotension, hypovolemia  Renal diet as tolerated

## 2023-08-14 NOTE — DISCHARGE SUMMARY
[facility] Medicine  Discharge Summary      Patient Name: Apollo Nguyen  MRN: [id number]  CHANELLE: [id number]  Patient Class: IP- Inpatient  Admission Date: 8/13/2023  Hospital Length of Stay: 1 days  Discharge Date and Time: 8/14/2023  5:27 PM  Attending Physician: Shayy Feng*   Discharging Provider: Shayy Feng MD  Primary Care Provider: Emma, Primary Doctor    Primary Care Team: Networked reference to record PCT     HPI:   Apollo Ngyuen is a 64 y/o F with PMH of CKD 3, hyperlipidemia, HIV, hypertension, osteopenia, prostate disorder present with a month history of shortness of breath that is worse in the past few days. There is associated chest pain, few episode of diarrhea  He denies fever, chills, cough, dysuria, frequency.  Patient stated that he has been compliant with medication except weight using BiPAP at night because he does not like the way it made him feel.   In the ED heart rate 54, RR 24, BP 93/54.  Sodium 135 BUN/creatinine 84/4.6, BNP 16, troponin 0.007, wbc 7.53, H/H 15.4/43.5, CXR with no acute finding, admitdhospital medicine      * No surgery found *      Hospital Course:   No notes on file     Goals of Care Treatment Preferences:  Code Status: Full Code      Consults:   Consults (From admission, onward)          Status Ordering Provider     [facility]  Once        Provider:  (Not yet assigned)    Acknowledged SHAYY FENG            Cardiac/Vascular  Hypotension  Low Bp on admit  Started on IVF   Improved      Hypertension  On home lisinopril-hold due to MARCELA and hypotension on admit  Resume at discharge      Renal/  * MARCELA (acute kidney injury)  Patient with acute kidney injury/acute renal failure likely due to pre-renal azotemia due to dehydration MARCELA is currently worsening. Baseline creatinine 1.4 - Labs reviewed- Renal function/electrolytes with Estimated Creatinine  "Clearance: 48 mL/min (A) (based on SCr of 1.7 mg/dL (H)). according to latest data. Monitor urine output and serial BMP and adjust therapy as needed. Avoid nephrotoxins and renally dose meds for GFR listed above.    ID  HIV disease  This patient in known to have AIDS (from CD4 count has been less than 200). Labs reviewed- No results found for: "CD4", No results found for: "HIVDNAPCR". Patient is on HAART. Will continue HAART. Prophylaxis was not indicated at this time- . Continue to monitor routine labs. Last CD4 count was   Lab Results   Component Value Date    ABSOLUTECD4 668 08/04/2023       We will not consult Infectious disease at this time. Other HIV-associated diseases are not present.    CD4 16.5 on 8/4/2023  Viral load - not detected    Endocrine  Class 1 obesity in adult  Body mass index is 34.28 kg/m². Morbid obesity complicates all aspects of disease management from diagnostic modalities to treatment. Weight loss encouraged and health benefits explained to patient.         GI  GERD (gastroesophageal reflux disease)  Continue PPI      Other  PATRIC (obstructive sleep apnea)  Stated he does not use home CPAP because he does not like do it makes him feel        Final Active Diagnoses:    Diagnosis Date Noted POA    PRINCIPAL PROBLEM:  MARCELA (acute kidney injury) [N17.9] 08/13/2023 Yes    GERD (gastroesophageal reflux disease) [K21.9] 08/13/2023 Yes    Class 1 obesity in adult [E66.9] 08/04/2023 Yes    HIV disease [B20] 08/04/2023 Yes    Hypertension [I10] 08/04/2023 Yes    Hypotension [I95.9] 08/04/2023 Yes    PATRIC (obstructive sleep apnea) [G47.33]  Yes      Problems Resolved During this Admission:       Discharged Condition: stable    Disposition: Home or Self Care    Follow Up:    Patient Instructions:      Ambulatory referral/consult to Cardiology   Standing Status: Future   Referral Priority: Routine Referral Type: Consultation   Referral Reason: Specialty Services Required   Requested Specialty: Cardiology "   Number of Visits Requested: 1     Activity as tolerated       Significant Diagnostic Studies:     Pending Diagnostic Studies:       None           Medications:  Reconciled Home Medications:      Medication List        CONTINUE taking these medications      acetaminophen 500 MG tablet  Commonly known as: TYLENOL  Take 500 mg by mouth every 6 (six) hours as needed for Pain.     aspirin 81 MG EC tablet  Commonly known as: ECOTRIN  Take 81 mg by mouth every evening.     atorvastatin 10 MG tablet  Commonly known as: LIPITOR  Take 10 mg by mouth.     kqdzcloib-fdlztsxu-ouofvpy ala -25 mg (25 kg or greater)  Commonly known as: BIKTARVY  Take 1 tablet by mouth once daily.     cholecalciferol (vitamin D3) 125 mcg (5,000 unit) Tab  Take 5,000 Units by mouth once daily.     lisinopriL 20 MG tablet  Commonly known as: PRINIVIL,ZESTRIL  Take 0.5 tablets (10 mg total) by mouth once daily.     loratadine 10 mg tablet  Commonly known as: CLARITIN  Take 10 mg by mouth daily as needed.     metFORMIN 500 MG ER 24hr tablet  Commonly known as: GLUCOPHAGE-XR  Take 500 mg by mouth every evening.     multivitamin per tablet  Commonly known as: THERAGRAN  Take 1 tablet by mouth once daily.     omega-3s-dha-epa-fish oil 900-110-680 mg Cap  Take 1 capsule by mouth once daily.     pantoprazole 20 MG tablet  Commonly known as: PROTONIX  Take 20 mg by mouth once daily.     tamsulosin 0.4 mg Cap  Commonly known as: FLOMAX  Take 0.4 mg by mouth every evening.     testosterone 1 % (50 mg/5 gram) Glpk  Commonly known as: ANDROGEL  Place 5 g onto the skin once daily.     VIAGRA 50 MG tablet  Generic drug: sildenafiL  TAKE 1 TO 2 TABLETS BY MOUTH EVERY 72 HOURS              Indwelling Lines/Drains at time of discharge:   Lines/Drains/Airways       None                   Time spent on the discharge of patient: 35 minutes         Shayy Murcia MD  Department of Hospital Medicine  [facility]

## 2023-08-14 NOTE — ASSESSMENT & PLAN NOTE
Patient with acute kidney injury/acute renal failure likely due to pre-renal azotemia due to dehydration MARCELA is currently worsening. Baseline creatinine 1.4 - Labs reviewed- Renal function/electrolytes with Estimated Creatinine Clearance: 48 mL/min (A) (based on SCr of 1.7 mg/dL (H)). according to latest data. Monitor urine output and serial BMP and adjust therapy as needed. Avoid nephrotoxins and renally dose meds for GFR listed above.

## 2023-08-14 NOTE — PT/OT/SLP EVAL
Occupational Therapy   Evaluation and Discharge Note    Name: Apollo Nguyen  MRN: [id number]  Admitting Diagnosis: MARCELA (acute kidney injury)  Recent Surgery: * No surgery found *      Recommendations:     Discharge Recommendations: home  Discharge Equipment Recommendations: none  Barriers to discharge:  None    Assessment:     Apollo Nguyen is a 63 y.o. male with a medical diagnosis of MARCELA (acute kidney injury). At this time, patient is functioning at their prior level of function and does not require further acute OT services.     OT eval performed this date with PT, pt agreeable to therapy. Pt with c/o mild dizziness, reports improving during ax. HR mid 60's during functional mobility, HR 46 at rest end of session. Pt performing functional mobility with Carmen & no AD. Nsg aware. Pt appears to be at his baseline at this time. No further acute OT needs noted at this time, d/c OT.     Plan:     During this hospitalization, patient does not require further acute OT services.  Please re-consult if situation changes.    Plan of Care Reviewed with: patient    Subjective     Chief Complaint: Mild dizziness  Patient/Family Comments/goals: Return home    Occupational Profile:  Pt lives with his spouse and family in a [facility] [address] [facility] with B HR far apart, and T/S  Prior to admission, patients level of function was Independent, driving, and not working; pt reports no falls.  Equipment used at home: none.  DME owned (not currently used): none.  Upon discharge, patient will have assistance from family.    Pain/Comfort:  Pain Rating 1: 0/10  Pain Rating Post-Intervention 1: 0/10    Patients cultural, spiritual, Adventist conflicts given the current situation: no    Objective:     Communicated with: venice prior to session.  Patient found HOB elevated with telemetry, peripheral IV upon OT entry to room.    General Precautions: Standard, fall  Orthopedic Precautions: N/A  Braces: N/A  Respiratory Status: Room  air     Occupational Performance:    Bed Mobility:    Patient completed Scooting/Bridging with modified independence  Patient completed Supine to Sit with modified independence    Functional Mobility/Transfers:  Patient completed Sit <> Stand Transfer with modified independence  with  no assistive device   Functional Mobility: Pt performing functional mobility in room/hallway with Carmen & no AD; no LOB noted. HR in the mid 60's during ambulation.     Cognitive/Visual Perceptual:  Cognitive/Psychosocial Skills:     -       Oriented to: Person, Place, Time, and Situation   -       Follows Commands/attention:Follows multistep  commands  -       Memory: No Deficits noted  -       Mood/Affect/Coping skills/emotional control: Cooperative and Pleasant    Physical Exam:  Upper Extremity Range of Motion:     -       Right Upper Extremity: WFL  -       Left Upper Extremity: WFL  Upper Extremity Strength:    -       Right Upper Extremity: WFL  -       Left Upper Extremity: WFL   Strength:    -       Right Upper Extremity: WFL  -       Left Upper Extremity: WFL    AMPAC 6 Click ADL:  AMPAC Total Score: 24    Treatment & Education:  OT eval performed this date with PT, pt agreeable to therapy.   Pt with c/o mild dizziness, reports improving during ax. HR mid 60's during functional mobility, HR 46 at rest end of session.   Pt performing functional mobility with Carmen & no AD. Nsg aware.   Pt appears to be at his baseline at this time.   No further acute OT needs noted at this time, d/c OT.     Patient left up in chair with all lines intact, call button in reach, chair alarm on, and nsg notified    GOALS:   Multidisciplinary Problems       Occupational Therapy Goals          Problem: Occupational Therapy    Goal Priority Disciplines Outcome Interventions   Occupational Therapy Goal     OT, PT/OT Adequate for Care Transition                        History:     Past Medical History:   Diagnosis Date    MARCELA (acute kidney injury)      CKD (chronic kidney disease) stage 3, GFR 30-59 ml/min     High cholesterol     HIV disease     Hypertension     Hypocalcemia     Hypophosphatemia     Osteopenia     Prostate disorder     Proteinuria        No past surgical history on file.    Time Tracking:     OT Date of Treatment: 08/14/23  OT Start Time: 1130  OT Stop Time: 1153  OT Total Time (min): 23 min with PT    Billable Minutes:Evaluation 8  Therapeutic Activity 15    8/14/2023

## 2023-08-14 NOTE — PLAN OF CARE
Problem: Physical Therapy  Goal: Physical Therapy Goal  Description: Goals to be met by: 23     Patient will increase functional independence with mobility by performin. Gait  x 150 feet with Modified [place] using No Assistive Device. MET 23    Outcome: Ongoing, Progressing     Pt ambulated ~300 ft with no AD and with Mod I. Pt ambulated with steady gait. Pt reporting 5/10 dizziness at rest which decreased to 2/10 while ambulating. No change in dizziness with positional changes or head turns. Pt reporting dizziness comes on randomly but improved with ambulating. HR 48 bpm at rest and in the 60s with activity. Recommending d/c home with no therapy or DME needs. Pt's functional mobility is at baseline. No further skilled acute PT needs. D/c PT.

## 2023-08-14 NOTE — PT/OT/SLP EVAL
Physical Therapy Evaluation, Treatment, and Discharge    Patient Name:  Apollo Nguyen   MRN:  [id number]    Recommendations:     Discharge Recommendations: home   Discharge Equipment Recommendations: none   Barriers to discharge: None    Assessment:     Apollo Nguyen is a 63 y.o. male admitted with a medical diagnosis of MARCELA (acute kidney injury).  He presents with the following impairments/functional limitations: impaired endurance .Pt ambulated ~300 ft with no AD and with Mod I / Independent. Pt ambulated with steady gait. Pt reporting 5/10 dizziness at rest which decreased to 2/10 while ambulating. No change in dizziness with positional changes or head turns. Pt reporting dizziness comes on randomly but improved with ambulating. HR 48 bpm at rest and in the 60s with activity. Recommending d/c home with no therapy or DME needs. Pt's functional mobility is at baseline. No further skilled acute PT needs. D/c PT.     Recent Surgery: * No surgery found *      Plan:     During this hospitalization, patient to be seen  (d/c PT)     Plan of Care Expires:  08/15/23    Subjective     Chief Complaint: mild dizziness at rest  Patient/Family Comments/goals: pt reports dizziness improved while ambulating and functioning at his baseline  Pain/Comfort:  Pain Rating 1: 0/10  Pain Rating Post-Intervention 1: 0/10    Patients cultural, spiritual, Taoism conflicts given the current situation: no    Living Environment:  Pt lives with his spouse and family in a [facility], [facility] with B HR far apart, and T/S  Prior to admission, patients level of function was Independent, driving, and not working; pt reports no falls.  Equipment used at home: none.  DME owned (not currently used): none.  Upon discharge, patient will have assistance from family.    Objective:     Communicated with nsg prior to session.  Patient found HOB elevated with telemetry, peripheral IV  upon PT entry to room.    General Precautions:  Standard, fall  Orthopedic Precautions:N/A   Braces: N/A  Respiratory Status: Room air    Exams:  Cognitive Exam:  Patient is oriented WFL  Postural Exam:  Patient presented with the following abnormalities:    -       Rounded shoulders  Skin Integrity/Edema:      -       Skin integrity: Visible skin intact  RLE ROM: WFL  RLE Strength: WFL  LLE ROM: WFL  LLE Strength: WFL    Functional Mobility:  Bed Mobility:     Scooting: modified independence  Supine to Sit: modified independence  Transfers:     Sit to Stand:  independence and modified independence with no AD  Gait: Pt ambulated ~300 ft with no AD and with Mod I / Independent. Pt ambulated with steady gait.      AM-PAC 6 CLICK MOBILITY  Total Score:24       Treatment & Education:  Pt educated on role of PT/POC and pt agreeable to participate  Pt reporting 5/10 dizziness at rest which decreased to 2/10 while ambulating.   No change in dizziness with positional changes or head turns.   No nystagmus noted  Pt reporting dizziness comes on randomly but improved with ambulating.   HR 48 bpm at rest and in the 60s with activity.   /64 at rest  Pt up in chair at end of session and educated on UE/LE therex and continued mobility and ambulation in hallway with nsg/staff  Recommending d/c home with no therapy or DME needs.   Pt's functional mobility is at baseline.     Patient left up in chair with all lines intact, call button in reach, chair alarm on, and nsg notified.    GOALS:   Multidisciplinary Problems       Physical Therapy Goals          Problem: Physical Therapy    Goal Priority Disciplines Outcome Goal Variances Interventions   Physical Therapy Goal     PT, PT/OT Ongoing, Progressing     Description: Goals to be met by: 23     Patient will increase functional independence with mobility by performin. Gait  x 150 feet with Modified Burton using No Assistive Device. MET 23                         History:     Past Medical History:    Diagnosis Date    MARCELA (acute kidney injury)     CKD (chronic kidney disease) stage 3, GFR 30-59 ml/min     High cholesterol     HIV disease     Hypertension     Hypocalcemia     Hypophosphatemia     Osteopenia     Prostate disorder     Proteinuria        No past surgical history on file.    Time Tracking:     PT Received On: 08/14/23  PT Start Time: 1130     PT Stop Time: 1153  PT Total Time (min): 23 min     Billable Minutes: Evaluation 10 and Therapeutic Activity 13 (cotx with OT)      08/14/2023

## 2023-08-14 NOTE — PT/OT/SLP PROGRESS
Occupational Therapy      Patient Name:  Apollo Nguyen   MRN:  [id number]    Patient not seen today secondary to Other (Comment) (CAMILLA - cardiology). Will follow-up as able.    8/14/2023

## 2023-08-14 NOTE — ASSESSMENT & PLAN NOTE
Body mass index is 34.28 kg/m². Morbid obesity complicates all aspects of disease management from diagnostic modalities to treatment. Weight loss encouraged and health benefits explained to patient.

## 2023-08-14 NOTE — SUBJECTIVE & OBJECTIVE
Interval History: awake and alert, feels better, requesting to be discharge as he has 2 appt tomorrow with PCP and for his colon cancer     MARCELA improving - continue IVF- repeat BMP at 2 and if improved vandana discharge  Quit crack cocaine for 20 years ago in 1998 (used for 4 years)    PT/OT to assess fatigue- appreciate recs    Used  service : [facility] [id number]  DC home today    Review of Systems   Constitutional:  Negative for activity change, appetite change, chills and fever.   Eyes:  Negative for photophobia and visual disturbance.   Respiratory:  Negative for cough and shortness of breath.    Gastrointestinal:  Negative for nausea and vomiting.   Endocrine: Negative for polyphagia and polyuria.   Genitourinary:  Negative for dysuria.   Neurological:  Negative for dizziness, tremors, weakness and headaches.   Psychiatric/Behavioral:  Negative for agitation and confusion.      Objective:     Vital Signs (Most Recent):  Temp: 98.1 °F (36.7 °C) (08/14/23 0742)  Pulse: (!) 50 (08/14/23 0742)  Resp: 17 (08/14/23 0742)  BP: (!) 141/65 (08/14/23 0742)  SpO2: 99 % (08/14/23 0742) Vital Signs (24h Range):  Temp:  [97.5 °F (36.4 °C)-98.7 °F (37.1 °C)] 98.1 °F (36.7 °C)  Pulse:  [49-60] 50  Resp:  [16-30] 17  SpO2:  [94 %-100 %] 99 %  BP: ()/(51-68) 141/65     Weight: 93.8 kg (206 lb 12.7 oz)  Body mass index is 34.41 kg/m².    Intake/Output Summary (Last 24 hours) at 8/14/2023 0913  Last data filed at 8/14/2023 0549  Gross per 24 hour   Intake --   Output 1200 ml   Net -1200 ml         Physical Exam  Constitutional:       General: He is not in acute distress.     Appearance: He is obese. He is not ill-appearing or toxic-appearing.   HENT:      Head: Normocephalic and atraumatic.   Cardiovascular:      Rate and Rhythm: Bradycardia present.      Pulses: Normal pulses.   Pulmonary:      Effort: Pulmonary effort is normal.      Breath sounds: No wheezing or rales.   Abdominal:      General: Bowel sounds are normal.  "     Comments: obese   Musculoskeletal:         General: Normal range of motion.      Cervical back: Normal range of motion.      Right lower leg: No edema.      Left lower leg: Edema present.   Skin:     General: Skin is warm.      Capillary Refill: Capillary refill takes less than 2 seconds.   Neurological:      Mental Status: He is alert and oriented to person, place, and time.   Psychiatric:         Mood and Affect: Mood normal.         Behavior: Behavior normal.             Significant Labs: A1C:   Recent Labs   Lab 08/04/23  1256   HGBA1C 5.6     ABGs: No results for input(s): "PH", "PCO2", "HCO3", "POCSATURATED", "BE", "TOTALHB", "COHB", "METHB", "O2HB", "POCFIO2", "PO2" in the last 48 hours.  Blood Culture: No results for input(s): "LABBLOO" in the last 48 hours.  BMP:   Recent Labs   Lab 08/14/23  0428         K 4.8      CO2 21*   BUN 62*   CREATININE 2.3*   CALCIUM 8.7   MG 2.3     CBC:   Recent Labs   Lab 08/13/23  1253 08/14/23  0428   WBC 7.53 5.48   HGB 15.4 14.3   HCT 43.5 41.5    172     Lactic Acid: No results for input(s): "LACTATE" in the last 48 hours.  Lipase: No results for input(s): "LIPASE" in the last 48 hours.  Lipid Panel: No results for input(s): "CHOL", "HDL", "LDLCALC", "TRIG", "CHOLHDL" in the last 48 hours.  Magnesium:   Recent Labs   Lab 08/13/23  1253 08/14/23  0428   MG 2.4 2.3     Troponin:   Recent Labs   Lab 08/13/23  1253   TROPONINI 0.007     TSH: No results for input(s): "TSH" in the last 4320 hours.  Urine Culture: No results for input(s): "LABURIN" in the last 48 hours.  Urine Studies:   Recent Labs   Lab 08/13/23  1348   COLORU Yellow   APPEARANCEUA Clear   PHUR 5.0   SPECGRAV 1.020   PROTEINUA Trace*   GLUCUA Negative   KETONESU Negative   BILIRUBINUA Negative   OCCULTUA Negative   NITRITE Negative   UROBILINOGEN Negative   LEUKOCYTESUR Negative       Significant Imaging: I have reviewed all pertinent imaging results/findings within the past 24 " hours.

## 2023-08-14 NOTE — PROGRESS NOTES
[facility] - Med Surg  Adult Nutrition  Progress Note    SUMMARY       Recommendations    Recommendations:   1. Continue cardiac diet.   2. Monitor need for ONS.   3. Monitor PO intake, weight changes, and labs.   4. RD to follow.    Goals:   Pt to meet 50-75% assessed needs through PO intake by RD follow up.  Nutrition Goal Status: new  Communication of RD Recs: other (comment) (Plan of care)    Assessment and Plan    No nutrition diagnosis at this time.       Malnutrition Assessment    NFPE not completed 8/14/2023 2/2 pt at cardiology diagnostics.    Reason for Assessment    Reason For Assessment: identified at risk by screening criteria  Diagnosis: other (see comments) (MARCELA)  Relevant Medical History: MARCELA, CKD 3, HIV, HTN, Hypophosphatemia, Hypocalcemia  Interdisciplinary Rounds: did not attend  General Information Comments: Pt admitted 8/13/2023 with MARCELA. Pt is currently on a cardiac diet with no intake recorded yet. NFPE not completed 8/14/2023 2/2 pt at cardiology diagnostics. Current wt 93.4 kg - no significant loss. LBM 8/12/2023. Sumanth 22 - no altered skin integrity noted.  Nutrition Discharge Planning: Pt to d/c on cardiac diet.    Patient Active Problem List   Diagnosis    PATRIC (obstructive sleep apnea)    HIV disease    Acute renal failure with tubular necrosis    Hypertension    Hypotension    Class 1 obesity in adult    MARCELA (acute kidney injury)    GERD (gastroesophageal reflux disease)     Past Medical History:   Diagnosis Date    MARCELA (acute kidney injury)     CKD (chronic kidney disease) stage 3, GFR 30-59 ml/min     High cholesterol     HIV disease     Hypertension     Hypocalcemia     Hypophosphatemia     Osteopenia     Prostate disorder     Proteinuria        Nutrition Risk Screen    Nutrition Risk Screen: no indicators present    Nutrition/Diet History    Food Preferences: No cultural or Yarsanism preferences identified.  Spiritual, Cultural Beliefs, Anabaptist Practices, Values that Affect Care:  "no  Food Allergies: NKFA  Factors Affecting Nutritional Intake: None identified at this time    Anthropometrics    Temp: 97.8 °F (36.6 °C)  Height: 5' 5" (165.1 cm)  Height (inches): 65 in  Weight Method: Bed Scale  Weight: 93.4 kg (206 lb)  Weight (lb): 206 lb  Ideal Body Weight (IBW), Male: 136 lb  % Ideal Body Weight, Male (lb): 151.47 %  BMI (Calculated): 34.3  BMI Grade: 30 - 34.9- obesity - grade I     Wt Readings from Last 10 Encounters:   08/14/23 93.4 kg (206 lb)   08/06/23 96.2 kg (212 lb 1.3 oz)   03/29/22 101.1 kg (222 lb 12.8 oz)   01/05/22 101.6 kg (223 lb 14.4 oz)   05/26/20 99.8 kg (220 lb)   07/13/17 98.9 kg (218 lb)   04/13/17 94.8 kg (209 lb)   03/29/17 94.3 kg (208 lb)       Lab/Procedures/Meds    Pertinent Labs Reviewed: reviewed  Pertinent Labs Comments: BUN 46, Creatinine 1.7    Lab Results   Component Value Date/Time     08/14/2023 01:49 PM    K 5.0 08/14/2023 01:49 PM    EGFRNORACEVR 45 (A) 08/14/2023 01:49 PM    CALCIUM 9.0 08/14/2023 01:49 PM    PHOS 3.1 08/14/2023 04:28 AM    MG 2.3 08/14/2023 04:28 AM    ALBUMIN 3.8 08/14/2023 04:28 AM    HGBA1C 5.6 08/04/2023 12:56 PM     BMP  Lab Results   Component Value Date     08/14/2023    K 5.0 08/14/2023     08/14/2023    CO2 24 08/14/2023    BUN 46 (H) 08/14/2023    CREATININE 1.7 (H) 08/14/2023    CALCIUM 9.0 08/14/2023    ANIONGAP 7 (L) 08/14/2023    EGFRNORACEVR 45 (A) 08/14/2023     Lab Results   Component Value Date    HGBA1C 5.6 08/04/2023     POCT Glucose   Date Value Ref Range Status   08/14/2023 96 70 - 110 mg/dL Final   08/14/2023 96 70 - 110 mg/dL Final   08/13/2023 112 (H) 70 - 110 mg/dL Final       Recent Labs   Lab 08/14/23  0428   WBC 5.48   RBC 4.64   HGB 14.3   HCT 41.5      MCV 89   MCH 30.8   MCHC 34.5       Pertinent Medications Reviewed: reviewed  Pertinent Medications Comments: Aspirin, Pantoprazole, Polyethylene Glycol, Senna-Docusate, Lactated Ringers, Auminum-Magnesium Hydroxide-Simethicone, " Insulin Aspart U-100 pen 1-10 units, Ondansetron, Prochlorperazine, Simethicone    Scheduled Meds:   aspirin  81 mg Oral Daily    atorvastatin  10 mg Oral Daily    heparin (porcine)  5,000 Units Subcutaneous Q8H    pantoprazole  40 mg Oral Daily    polyethylene glycol  17 g Oral Daily    senna-docusate 8.6-50 mg  1 tablet Oral BID    sodium chloride 0.9%  10 mL Intravenous Q8H    tamsulosin  0.4 mg Oral Daily     Continuous Infusions:   lactated ringers 125 mL/hr at 08/14/23 0836     PRN Meds:.acetaminophen, aluminum-magnesium hydroxide-simethicone, dextrose 10%, dextrose 10%, glucagon (human recombinant), glucose, glucose, insulin aspart U-100, LIDOcaine, melatonin, naloxone, ondansetron, prochlorperazine, senna-docusate 8.6-50 mg, simethicone, sodium chloride 0.9%    Estimated/Assessed Needs    Weight Used For Calorie Calculations: 93.4 kg (205 lb 14.6 oz)  Energy Calorie Requirements (kcal): 1868 (20 kcal/kg)  Energy Need Method: Kcal/kg  Protein Requirements: 75 g (0.8 g/kg)  Weight Used For Protein Calculations: 93.4 kg (205 lb 14.6 oz)  Fluid Requirements (mL): 1868  Estimated Fluid Requirement Method: RDA Method  RDA Method (mL): 1868         Nutrition Prescription Ordered    Current Diet Order: Cardiac Diet    Evaluation of Received Nutrient/Fluid Intake    I/O: 0/300    Intake/Output Summary (Last 24 hours) at 8/14/2023 1434  Last data filed at 8/14/2023 1150  Gross per 24 hour   Intake --   Output 1500 ml   Net -1500 ml       Energy Calories Required: not meeting needs  Protein Required: not meeting needs  Fluid Required: not meeting needs  % Intake of Estimated Energy Needs: Other: No intake recorded  % Meal Intake: Other: No intake recorded    Nutrition Risk    Level of Risk/Frequency of Follow-up:  (1x/week)     Monitor and Evaluation    Food and Nutrient Intake: food and beverage intake  Food and Nutrient Adminstration: diet order  Anthropometric Measurements: weight, weight change, body mass  index  Biochemical Data, Medical Tests and Procedures: electrolyte and renal panel, gastrointestinal profile, glucose/endocrine profile  Nutrition-Focused Physical Findings: overall appearance     Nutrition Follow-Up    RD Follow-up?: Yes

## 2023-08-14 NOTE — PLAN OF CARE
Patient resting in bed, AAOX4. IVF infusing as ordered. Medications administered as ordered. No complaints of pain overnight. Encouraged to call with needs or concerns. Plan of care on going.

## 2023-08-14 NOTE — PLAN OF CARE
OT giselle performed this date with PT, pt agreeable to therapy. Pt with c/o mild dizziness, reports improving during ax. HR mid 60's during functional mobility, HR 46 at rest end of session. Pt performing functional mobility with Carmen & no AD. Nsg aware. Pt appears to be at his baseline at this time. No further acute OT needs noted at this time, d/c OT.     Problem: Occupational Therapy  Goal: Occupational Therapy Goal  Outcome: Adequate for Care Transition

## 2023-08-15 NOTE — PLAN OF CARE
[facility]  Discharge Final Note    Primary Care Provider: No, Primary Doctor    Expected Discharge Date: 8/14/2023    Final Discharge Note (most recent)       Final Note - 08/15/23 0718          Final Note    Assessment Type Final Discharge Note (P)      Anticipated Discharge Disposition Home or Self Care (P)      Hospital Resources/Appts/Education Provided Appointments scheduled and added to AVS (P)                      Contact Info       Arielle Chowdhury MD   Specialty: Internal Medicine    [address] [address]  [address]  [facility] [address]   Phone: [phone]       Next Steps: Follow up on 8/25/2023    Instructions: at 9:00 AM; hospital follow up appointment in the [facility]

## 2023-08-18 NOTE — PHYSICIAN QUERY
"PT Name: Apollo Nguyen  MR #: [id number]    DOCUMENTATION CLARIFICATION     CDS/: Renetta Ramon RN              Contact information: [email or web address]    This form is a permanent document in the medical record.     Query Date: August 18, 2023      By submitting this query, we are merely seeking further clarification of documentation. Please utilize your independent clinical judgment when addressing the question(s) below.    The Medical Record contains the following:   Indicators Supporting Clinical Findings Location in Medical Record   x HIV or AIDS HIV disease  This patient in known to have AIDS (from CD4 count has been less than 200).     Labs reviewed- No results found for: "CD4", No results found for: "HIVDNAPCR".   Patient is on HAART.   Will continue HAART.   Prophylaxis was not indicated at this time- .  Continue to monitor routine labs.   Last CD4 count was   We will not consult Infectious disease at this time.   Other HIV-associated diseases are not present.    CD4 16.5 on 8/4/2023  Viral load - not detect      8/9/23 QUERY:   Asymptomatic HIV infection   8/13 HP                            8/9/23 QUERY   x CD4 Count          CD4% Absolute CD4= 668  CD4% helper= 16.5   8/4 Lab      History of or current Opportunistic Infection     x Acute/Chronic Illness PMH of CKD 3, hyperlipidemia, HIV, hypertension, osteopenia, prostate disorder present with a month history of SOB that is worse in the past few days  MARCELA  HIV disease   8/14 DC summary          AIDS-Defining Condition or HIV-Related Illness documented     x Medication Current outpatient medications:  [id number] ala  8/13 HP    8/13 Orders    Other       The clinical guidelines noted are only a system guideline. It does not replace the providers clinical judgment.    The following are AIDS-Defining Conditions or HIV-Related Illnesses (noted in more recent literature as Opportunistic Illnesses in HIV " Infection):   Candidiasis of bronchi, trachea, or lungs Lymphoma, Burkitt (or equivalent term)   Candidiasis of esophagus Lymphoma, immunoblastic (or equivalent term)   Cervical cancer, invasive only among adults, adolescents, and children aged > 6 years Lymphoma, primary, of brain   Coccidioidomycosis, disseminated or extrapulmonary Mycobacterium avium complex or Mycobacterium kansasii, disseminated or extrapulmonary   Cryptococcosis, extrapulmonary Mycobacterium tuberculosis of any site, pulmonary (only among adults, adolescents, and children aged >6 years), disseminated, or extrapulmonary   Cryptosporidiosis, chronic intestinal (>1 months duration) Mycobacterium, other species or unidentified species, disseminated or extrapulmonary   Cytomegalovirus disease (other than liver, spleen, or nodes), onset at age >1 month Pneumocystitis jirovecii (previously known as Pneumocystis carinii) pneumonia   Cytomegalovirus retinitis (with loss of vision) Pneumonia, recurrent only among adults, adolescents, and children aged >6 years   Encephalopathy attributed to HIV Progressive multifocal leukoencephalopathy   Herpes simplex: chronic ulcers (>1 months  duration) or bronchitis, pneumonitis, or esophagitis (onset at age >1 month) Salmonella septicemia, recurrent   Histoplasmosis, disseminated or extrapulmonary Toxoplasmosis of brain, onset at age >1 month   Isosporiasis, chronic intestinal (>1 months duration) Wasting syndrome attributed to HIV   Kaposi sarcoma        Once a patient is diagnosed with HIV Disease or AIDS the diagnosis still stands even if, after treatment, the CD4+ T-lymphocyte count rises to above 200 µL or other AIDS-Defining Conditions or HIV-Related Illnesses are resolved.       Provider, please clarify conflicting status as it relates to HIV diagnosis.    [  x ] Asymptomatic HIV Infection - Positive Status Only without any history of or current AIDS-Defining Condition or HIV-Related Illness and  without a history of or current CD4+ T-Lymphocyte count < 200 uL or total percentage of T-lymphocytes <14 (percentage is used only if the count is missing)     [   ] HIV Disease or AIDS, please check the applicable support for the diagnosis:    [   ] Patient has or had a history of CD4+ T-Lymphocyte count < 200 uL or total percentage of T-lymphocytes <14 (percentage is used only if the count is missing)   [   ] Patient has or had a history of AIDS-Defining Condition or HIV-Related Illness (please specify if not previously documented): ______________   [   ] Patient has or had a history of CD4+ T-Lymphocyte count < 200 uL or total percentage of T-lymphocytes <14 (percentage is used only if the count is missing) and an AIDS-Defining Condition or HIV-Related Illness (please specify if not previously documented): ______________      [   ] Other (please specify):________       Please document in your progress notes daily for the duration of treatment until resolved and include in your discharge summary.    References:  Revised Surveillance Case Definition for HIV Infection - [place], 2014. (2014, April 11). Retrieved November 09, 2020, from https://www.cdc.gov/mmwr/preview/mmwrhtml/fq6977g1.htm?s_cid=rq2024n6_z  ALEE Wiley MD. (2019, April). Techniques and interpretation of measurement of the CD4 cell count in HIV-infected patients ([id number] TONE Gayle MD & [id number] LAZARO Ivory MD, MPH, Eds.). Retrieved November 09, 2020, from https://www.[employer]/contents/techniques-and-interpretation-vc-qmpguagklmg-en-the-cd4-cell-count-in-hiv-infected-patients?search=hiv%20disease%20cd4%20count&source=search_result&selectedTitle=1~150&usage_type=default&display_rank=1#[id number]    Form 03265

## 2023-08-25 ENCOUNTER — OFFICE VISIT (OUTPATIENT)
Dept: PRIMARY CARE CLINIC | Facility: CLINIC | Age: 63
End: 2023-08-25
Payer: COMMERCIAL

## 2023-08-25 ENCOUNTER — TELEPHONE (OUTPATIENT)
Dept: PRIMARY CARE CLINIC | Facility: CLINIC | Age: 63
End: 2023-08-25

## 2023-08-25 ENCOUNTER — LAB VISIT (OUTPATIENT)
Dept: LAB | Facility: HOSPITAL | Age: 63
End: 2023-08-25
Attending: INTERNAL MEDICINE
Payer: COMMERCIAL

## 2023-08-25 VITALS
WEIGHT: 207.69 LBS | DIASTOLIC BLOOD PRESSURE: 65 MMHG | BODY MASS INDEX: 34.56 KG/M2 | SYSTOLIC BLOOD PRESSURE: 99 MMHG | HEART RATE: 57 BPM | TEMPERATURE: 98 F | OXYGEN SATURATION: 97 %

## 2023-08-25 DIAGNOSIS — N17.9 AKI (ACUTE KIDNEY INJURY): ICD-10-CM

## 2023-08-25 DIAGNOSIS — B20 HIV DISEASE: ICD-10-CM

## 2023-08-25 DIAGNOSIS — I95.9 HYPOTENSION, UNSPECIFIED HYPOTENSION TYPE: ICD-10-CM

## 2023-08-25 DIAGNOSIS — N17.9 AKI (ACUTE KIDNEY INJURY): Primary | ICD-10-CM

## 2023-08-25 DIAGNOSIS — N17.0 ACUTE RENAL FAILURE WITH TUBULAR NECROSIS: ICD-10-CM

## 2023-08-25 LAB
ANION GAP SERPL CALC-SCNC: 9 MMOL/L (ref 8–16)
BUN SERPL-MCNC: 42 MG/DL (ref 8–23)
CALCIUM SERPL-MCNC: 9.3 MG/DL (ref 8.7–10.5)
CHLORIDE SERPL-SCNC: 105 MMOL/L (ref 95–110)
CK SERPL-CCNC: 163 U/L (ref 20–200)
CO2 SERPL-SCNC: 26 MMOL/L (ref 23–29)
CREAT SERPL-MCNC: 1.8 MG/DL (ref 0.5–1.4)
EST. GFR  (NO RACE VARIABLE): 42 ML/MIN/1.73 M^2
GLUCOSE SERPL-MCNC: 105 MG/DL (ref 70–110)
POTASSIUM SERPL-SCNC: 5.1 MMOL/L (ref 3.5–5.1)
SODIUM SERPL-SCNC: 140 MMOL/L (ref 136–145)

## 2023-08-25 PROCEDURE — 3008F BODY MASS INDEX DOCD: CPT | Mod: CPTII,S$GLB,, | Performed by: INTERNAL MEDICINE

## 2023-08-25 PROCEDURE — 99204 OFFICE O/P NEW MOD 45 MIN: CPT | Mod: S$GLB,,, | Performed by: INTERNAL MEDICINE

## 2023-08-25 PROCEDURE — 3008F PR BODY MASS INDEX (BMI) DOCUMENTED: ICD-10-PCS | Mod: CPTII,S$GLB,, | Performed by: INTERNAL MEDICINE

## 2023-08-25 PROCEDURE — 3078F DIAST BP <80 MM HG: CPT | Mod: CPTII,S$GLB,, | Performed by: INTERNAL MEDICINE

## 2023-08-25 PROCEDURE — 3066F NEPHROPATHY DOC TX: CPT | Mod: CPTII,S$GLB,, | Performed by: INTERNAL MEDICINE

## 2023-08-25 PROCEDURE — 3066F PR DOCUMENTATION OF TREATMENT FOR NEPHROPATHY: ICD-10-PCS | Mod: CPTII,S$GLB,, | Performed by: INTERNAL MEDICINE

## 2023-08-25 PROCEDURE — 3074F PR MOST RECENT SYSTOLIC BLOOD PRESSURE < 130 MM HG: ICD-10-PCS | Mod: CPTII,S$GLB,, | Performed by: INTERNAL MEDICINE

## 2023-08-25 PROCEDURE — 1160F RVW MEDS BY RX/DR IN RCRD: CPT | Mod: CPTII,S$GLB,, | Performed by: INTERNAL MEDICINE

## 2023-08-25 PROCEDURE — 4010F ACE/ARB THERAPY RXD/TAKEN: CPT | Mod: CPTII,S$GLB,, | Performed by: INTERNAL MEDICINE

## 2023-08-25 PROCEDURE — 82550 ASSAY OF CK (CPK): CPT | Performed by: INTERNAL MEDICINE

## 2023-08-25 PROCEDURE — 3074F SYST BP LT 130 MM HG: CPT | Mod: CPTII,S$GLB,, | Performed by: INTERNAL MEDICINE

## 2023-08-25 PROCEDURE — 99999 PR PBB SHADOW E&M-EST. PATIENT-LVL V: CPT | Mod: PBBFAC,,, | Performed by: INTERNAL MEDICINE

## 2023-08-25 PROCEDURE — 3061F NEG MICROALBUMINURIA REV: CPT | Mod: CPTII,S$GLB,, | Performed by: INTERNAL MEDICINE

## 2023-08-25 PROCEDURE — 1111F DSCHRG MED/CURRENT MED MERGE: CPT | Mod: CPTII,S$GLB,, | Performed by: INTERNAL MEDICINE

## 2023-08-25 PROCEDURE — 36415 COLL VENOUS BLD VENIPUNCTURE: CPT | Performed by: INTERNAL MEDICINE

## 2023-08-25 PROCEDURE — 99204 PR OFFICE/OUTPT VISIT, NEW, LEVL IV, 45-59 MIN: ICD-10-PCS | Mod: S$GLB,,, | Performed by: INTERNAL MEDICINE

## 2023-08-25 PROCEDURE — 1159F MED LIST DOCD IN RCRD: CPT | Mod: CPTII,S$GLB,, | Performed by: INTERNAL MEDICINE

## 2023-08-25 PROCEDURE — 99999 PR PBB SHADOW E&M-EST. PATIENT-LVL V: ICD-10-PCS | Mod: PBBFAC,,, | Performed by: INTERNAL MEDICINE

## 2023-08-25 PROCEDURE — 3044F HG A1C LEVEL LT 7.0%: CPT | Mod: CPTII,S$GLB,, | Performed by: INTERNAL MEDICINE

## 2023-08-25 PROCEDURE — 3061F PR NEG MICROALBUMINURIA RESULT DOCUMENTED/REVIEW: ICD-10-PCS | Mod: CPTII,S$GLB,, | Performed by: INTERNAL MEDICINE

## 2023-08-25 PROCEDURE — 4010F PR ACE/ARB THEARPY RXD/TAKEN: ICD-10-PCS | Mod: CPTII,S$GLB,, | Performed by: INTERNAL MEDICINE

## 2023-08-25 PROCEDURE — 1159F PR MEDICATION LIST DOCUMENTED IN MEDICAL RECORD: ICD-10-PCS | Mod: CPTII,S$GLB,, | Performed by: INTERNAL MEDICINE

## 2023-08-25 PROCEDURE — 1111F PR DISCHARGE MEDS RECONCILED W/ CURRENT OUTPATIENT MED LIST: ICD-10-PCS | Mod: CPTII,S$GLB,, | Performed by: INTERNAL MEDICINE

## 2023-08-25 PROCEDURE — 80048 BASIC METABOLIC PNL TOTAL CA: CPT | Performed by: INTERNAL MEDICINE

## 2023-08-25 PROCEDURE — 3044F PR MOST RECENT HEMOGLOBIN A1C LEVEL <7.0%: ICD-10-PCS | Mod: CPTII,S$GLB,, | Performed by: INTERNAL MEDICINE

## 2023-08-25 PROCEDURE — 3078F PR MOST RECENT DIASTOLIC BLOOD PRESSURE < 80 MM HG: ICD-10-PCS | Mod: CPTII,S$GLB,, | Performed by: INTERNAL MEDICINE

## 2023-08-25 PROCEDURE — 1160F PR REVIEW ALL MEDS BY PRESCRIBER/CLIN PHARMACIST DOCUMENTED: ICD-10-PCS | Mod: CPTII,S$GLB,, | Performed by: INTERNAL MEDICINE

## 2023-08-25 RX ORDER — LISINOPRIL 5 MG/1
5 TABLET ORAL DAILY
Qty: 30 TABLET | Refills: 11 | Status: SHIPPED | OUTPATIENT
Start: 2023-08-25 | End: 2024-08-24

## 2023-08-25 NOTE — PROGRESS NOTES
[facility]   New Visit Progress Note   Recent Hospital Discharge     PRESENTING HISTORY     Chief Complaint/Reason for Admission:  Follow up Hospital Discharge   PCP: [facility] Primary [facility]    History of Present Illness:  Mr. Apollo Nguyen is a 63 y.o. male who was recently admitted to the hospital.    [facility]  Discharge Summary        Patient Name: Apollo Nguyen  MRN: [id number]  CHANELLE: [id number]  Patient Class: IP- Inpatient  Admission Date: 8/13/2023  Hospital Length of Stay: 1 days  Discharge Date and Time: 8/14/2023  5:27 PM  Attending Physician: Shayy Murcia   Discharging Provider: Shayy Murcia MD  Primary Care Provider: [facility]  ___________________________________________________________________    Today:  Hospitalized 8/4/23 - 8/6/23 for MARCELA.  BUN/Cr 62/5.9 at presentation.  Responded to hydration and supportive care.  Home dose Ozempic discontinued.  Home dose Lisinopril decreased.   Imaging unremarkable.     Re-hospitalized 8/13/23 - 8/14/23 with recurrent MARCELA.   84/4.6 at presentation.   Imaging again unremarkable.  No evidence on infection on UA.   Responded to IV fluids and supportive care.  Discharged to home.    Unaccompanied today.  Ambulatory and independent with ADL's.  Did not bring medication bottles for review.  Reports dizziness, generalized ill feeling, fatigue, decreased urine output.  Works construction outdoors.  Tries to stay hydrated, drinks lots of water while working.   Has HIV, primary care, and dental care with [facility] appt 9/28/23.  Does not have established Nephrology care.     Review of Systems  General ROS: negative for chills, fever or weight loss  + fatigue, dizziness,   Psychological ROS: negative for hallucination, depression or suicidal ideation  Ophthalmic ROS: negative for blurry vision, photophobia or eye pain  ENT ROS: negative for epistaxis, sore throat or  rhinorrhea  Respiratory ROS: no cough, shortness of breath, or wheezing  Cardiovascular ROS: no chest pain or dyspnea on exertion  Gastrointestinal ROS: no abdominal pain, change in bowel habits, or black/ bloody stools  Genito-Urinary ROS: no dysuria, trouble voiding, or hematuria  + decreased urine output   Musculoskeletal ROS: negative for gait disturbance or muscular weakness  Neurological ROS: no syncope or seizures; no ataxia  Dermatological ROS: negative for pruritis, rash and jaundice    PAST HISTORY:     Past Medical History:   Diagnosis Date    MARCELA (acute kidney injury)     CKD (chronic kidney disease) stage 3, GFR 30-59 ml/min     High cholesterol     HIV disease     Hypertension     Hypocalcemia     Hypophosphatemia     Osteopenia     Prostate disorder     Proteinuria        No past surgical history on file.    No family history on file.      MEDICATIONS & ALLERGIES:     Current Outpatient Medications on File Prior to Visit   Medication Sig Dispense Refill    acetaminophen (TYLENOL) 500 MG tablet Take 500 mg by mouth every 6 (six) hours as needed for Pain.      aspirin (ECOTRIN) 81 MG EC tablet Take 81 mg by mouth every evening.      atorvastatin (LIPITOR) 10 MG tablet Take 10 mg by mouth.      nnjkslzdh-iverdrme-qzjysby ala (BIKTARVY) -25 mg (25 kg or greater) Take 1 tablet by mouth once daily.      lisinopriL (PRINIVIL,ZESTRIL) 20 MG tablet Take 0.5 tablets (10 mg total) by mouth once daily.      metFORMIN (GLUCOPHAGE-XR) 500 MG ER 24hr tablet Take 500 mg by mouth every evening.      omega-3s-dha-epa-fish oil 900-110-680 mg Cap Take 1 capsule by mouth once daily.      pantoprazole (PROTONIX) 20 MG tablet Take 20 mg by mouth once daily.      tamsulosin (FLOMAX) 0.4 mg Cap Take 0.4 mg by mouth every evening.      testosterone (ANDROGEL) 1 % (50 mg/5 gram) GlPk Place 5 g onto the skin once daily.      cholecalciferol, vitamin D3, 125 mcg (5,000 unit) Tab Take 5,000 Units by mouth once daily.       loratadine (CLARITIN) 10 mg tablet Take 10 mg by mouth daily as needed.      multivitamin (THERAGRAN) per tablet Take 1 tablet by mouth once daily.      sildenafiL (VIAGRA) 50 MG tablet TAKE 1 TO 2 TABLETS BY MOUTH EVERY 72 HOURS       No current facility-administered medications on file prior to visit.        Review of patient's allergies indicates:  No Known Allergies    OBJECTIVE:     Vital Signs:  BP 99/65 (BP Location: Left arm, Patient Position: Sitting, BP Method: Small (Automatic))   Pulse (!) 57   Temp 98.1 °F (36.7 °C) (Oral)   Wt 94.2 kg (207 lb 10.8 oz)   SpO2 97%   BMI 34.56 kg/m²   Wt Readings from Last 3 Encounters:   08/25/23 0905 94.2 kg (207 lb 10.8 oz)   08/14/23 1056 93.4 kg (206 lb)   08/14/23 0500 93.8 kg (206 lb 12.7 oz)   08/13/23 1153 96.2 kg (212 lb)   08/06/23 0008 96.2 kg (212 lb 1.3 oz)   08/04/23 1922 92.9 kg (204 lb 12.9 oz)   08/04/23 1229 93 kg (205 lb)     Body mass index is 34.56 kg/m².   97%    Physical Exam:  BP 99/65 (BP Location: Left arm, Patient Position: Sitting, BP Method: Small (Automatic))   Pulse (!) 57   Temp 98.1 °F (36.7 °C) (Oral)   Wt 94.2 kg (207 lb 10.8 oz)   SpO2 97%   BMI 34.56 kg/m²   General appearance: alert, cooperative, no distress  Constitutional:Oriented to person, place, and time  + obese    HEENT: Normocephalic, atraumatic, neck symmetrical, no nasal discharge   Eyes: conjunctivae/corneas clear, PERRL, EOM's intact  Lungs: clear to auscultation bilaterally, no dullness to percussion bilaterally  Heart: regular rate and rhythm without rub; no displacement of the PMI   Abdomen: soft, non-tender; bowel sounds normoactive; no organomegaly  Extremities: extremities symmetric; no clubbing, cyanosis, or edema  Integument: Skin color, texture, turgor normal; no rashes; hair distrubution normal  Neurologic: Alert and oriented X 3, normal strength, normal coordination and gait  Psychiatric: no pressured speech; normal affect; no evidence of impaired  "cognition     Laboratory  Lab Results   Component Value Date    WBC 5.48 08/14/2023    HGB 14.3 08/14/2023    HCT 41.5 08/14/2023    MCV 89 08/14/2023     08/14/2023     BMP  Lab Results   Component Value Date     08/14/2023    K 5.0 08/14/2023     08/14/2023    CO2 24 08/14/2023    BUN 46 (H) 08/14/2023    CREATININE 1.7 (H) 08/14/2023    CALCIUM 9.0 08/14/2023    ANIONGAP 7 (L) 08/14/2023    EGFRNORACEVR 45 (A) 08/14/2023     Lab Results   Component Value Date    ALT 25 08/14/2023    AST 11 08/14/2023    ALKPHOS 64 08/14/2023    BILITOT 0.4 08/14/2023     No results found for: "INR", "PROTIME"  Lab Results   Component Value Date    HGBA1C 5.6 08/04/2023       Diagnostic Results:    US Retroperitoneal 8/13/23:  1. No hydronephrosis.  2. Two small left renal cysts.     CT ABD Pelvis 8/4/23:  1. No findings to suggest obstructive uropathy.  2. Surgical changes of the large bowel, no obstruction.  3. Please see above for additional findings.    ASSESSMENT & PLAN:       MARCELA (acute kidney injury)  Acute renal failure with tubular necrosis  - 2 recent hospitalizations as above  - BUN/Cr 46/1.7 at discharge  - labs today  - see Nephrology 10/5/23   -     CK; Future; Expected date: 08/25/2023  -     Basic Metabolic Panel; Future; Expected date: 08/25/2023  -     Ambulatory referral/consult to Nephrology; Future; Expected date: 09/01/2023    Hypotension, unspecified hypotension type  - marginal blood pressure noted and patient symptomatic with fatigue, dizziness  - decrease Lisinopril dose to 5 mg daily, assess response   -     lisinopriL (PRINIVIL,ZESTRIL) 5 MG tablet; Take 1 tablet (5 mg total) by mouth once daily.  Dispense: 30 tablet; Refill: 11    HIV disease  - management per ID team at [facility] - next follow up 9/28/23   - continue Biktarvy     Patient will be released from hospital follow up clinic.  He will see his primary team as scheduled.  Records will be shared for coordination of care. "     Instructions for the patient:      Scheduled Follow-up :  Future Appointments   Date Time Provider Department Center   8/25/2023 10:00 AM APPOINTMENT LAB, [facility] Caryl Sandrine   10/5/2023 10:30 AM Steffany Shields MD [facility]       Post Visit Medication List:     Medication List            Accurate as of August 25, 2023  9:50 AM. If you have any questions, ask your nurse or doctor.                CHANGE how you take these medications      lisinopriL 5 MG tablet  Commonly known as: PRINIVIL,ZESTRIL  Take 1 tablet (5 mg total) by mouth once daily.  What changed:   medication strength  how much to take  Changed by: Arielle Chowdhury MD            CONTINUE taking these medications      acetaminophen 500 MG tablet  Commonly known as: TYLENOL     aspirin 81 MG EC tablet  Commonly known as: ECOTRIN     atorvastatin 10 MG tablet  Commonly known as: LIPITOR     haheczczt-twufcxvy-gfjchny ala -25 mg (25 kg or greater)  Commonly known as: BIKTARVY     cholecalciferol (vitamin D3) 125 mcg (5,000 unit) Tab     loratadine 10 mg tablet  Commonly known as: CLARITIN     metFORMIN 500 MG ER 24hr tablet  Commonly known as: GLUCOPHAGE-XR     multivitamin per tablet  Commonly known as: THERAGRAN     omega-3s-dha-epa-fish oil 900-110-680 mg Cap     pantoprazole 20 MG tablet  Commonly known as: PROTONIX     tamsulosin 0.4 mg Cap  Commonly known as: FLOMAX     testosterone 1 % (50 mg/5 gram) Glpk  Commonly known as: ANDROGEL     VIAGRA 50 MG tablet  Generic drug: sildenafiL               Where to Get Your Medications        These medications were sent to [facility], [place] - [address] [address]  [address] [address]      Phone: [phone]   lisinopriL 5 MG tablet         Signing Physician:  Arielle Chowdhury MD

## 2023-08-25 NOTE — TELEPHONE ENCOUNTER
----- Message from Arielle Chowdhury MD sent at 8/25/2023  1:14 PM CDT -----  Please let patient know his labs show that kidney function has remained stable since hospital discharge but is still impaired. He should keep his upcoming Nephrology appointment. Remain hydrated while working in the heat.

## 2023-08-30 ENCOUNTER — TELEPHONE (OUTPATIENT)
Dept: MEDSURG UNIT | Facility: HOSPITAL | Age: 63
End: 2023-08-30
Payer: COMMERCIAL

## 2023-09-14 ENCOUNTER — OFFICE VISIT (OUTPATIENT)
Dept: CARDIOLOGY | Facility: CLINIC | Age: 63
End: 2023-09-14
Payer: COMMERCIAL

## 2023-09-14 VITALS
SYSTOLIC BLOOD PRESSURE: 120 MMHG | HEART RATE: 61 BPM | OXYGEN SATURATION: 96 % | HEIGHT: 65 IN | BODY MASS INDEX: 35.26 KG/M2 | DIASTOLIC BLOOD PRESSURE: 76 MMHG | WEIGHT: 211.63 LBS

## 2023-09-14 DIAGNOSIS — R39.12 WEAK URINARY STREAM: ICD-10-CM

## 2023-09-14 DIAGNOSIS — G47.33 OSA (OBSTRUCTIVE SLEEP APNEA): ICD-10-CM

## 2023-09-14 DIAGNOSIS — R00.1 BRADYCARDIA: ICD-10-CM

## 2023-09-14 DIAGNOSIS — N17.9 AKI (ACUTE KIDNEY INJURY): ICD-10-CM

## 2023-09-14 DIAGNOSIS — E66.09 CLASS 1 OBESITY DUE TO EXCESS CALORIES WITHOUT SERIOUS COMORBIDITY WITH BODY MASS INDEX (BMI) OF 34.0 TO 34.9 IN ADULT: ICD-10-CM

## 2023-09-14 DIAGNOSIS — B20 HIV DISEASE: ICD-10-CM

## 2023-09-14 DIAGNOSIS — I10 HYPERTENSION, UNSPECIFIED TYPE: Primary | ICD-10-CM

## 2023-09-14 PROCEDURE — 3066F PR DOCUMENTATION OF TREATMENT FOR NEPHROPATHY: ICD-10-PCS | Mod: CPTII,S$GLB,, | Performed by: INTERNAL MEDICINE

## 2023-09-14 PROCEDURE — 3008F PR BODY MASS INDEX (BMI) DOCUMENTED: ICD-10-PCS | Mod: CPTII,S$GLB,, | Performed by: INTERNAL MEDICINE

## 2023-09-14 PROCEDURE — 3061F PR NEG MICROALBUMINURIA RESULT DOCUMENTED/REVIEW: ICD-10-PCS | Mod: CPTII,S$GLB,, | Performed by: INTERNAL MEDICINE

## 2023-09-14 PROCEDURE — 3066F NEPHROPATHY DOC TX: CPT | Mod: CPTII,S$GLB,, | Performed by: INTERNAL MEDICINE

## 2023-09-14 PROCEDURE — 1159F MED LIST DOCD IN RCRD: CPT | Mod: CPTII,S$GLB,, | Performed by: INTERNAL MEDICINE

## 2023-09-14 PROCEDURE — 99204 PR OFFICE/OUTPT VISIT, NEW, LEVL IV, 45-59 MIN: ICD-10-PCS | Mod: S$GLB,,, | Performed by: INTERNAL MEDICINE

## 2023-09-14 PROCEDURE — 3061F NEG MICROALBUMINURIA REV: CPT | Mod: CPTII,S$GLB,, | Performed by: INTERNAL MEDICINE

## 2023-09-14 PROCEDURE — 3078F PR MOST RECENT DIASTOLIC BLOOD PRESSURE < 80 MM HG: ICD-10-PCS | Mod: CPTII,S$GLB,, | Performed by: INTERNAL MEDICINE

## 2023-09-14 PROCEDURE — 99999 PR PBB SHADOW E&M-EST. PATIENT-LVL V: ICD-10-PCS | Mod: PBBFAC,,, | Performed by: INTERNAL MEDICINE

## 2023-09-14 PROCEDURE — 3074F SYST BP LT 130 MM HG: CPT | Mod: CPTII,S$GLB,, | Performed by: INTERNAL MEDICINE

## 2023-09-14 PROCEDURE — 3078F DIAST BP <80 MM HG: CPT | Mod: CPTII,S$GLB,, | Performed by: INTERNAL MEDICINE

## 2023-09-14 PROCEDURE — 4010F ACE/ARB THERAPY RXD/TAKEN: CPT | Mod: CPTII,S$GLB,, | Performed by: INTERNAL MEDICINE

## 2023-09-14 PROCEDURE — 1159F PR MEDICATION LIST DOCUMENTED IN MEDICAL RECORD: ICD-10-PCS | Mod: CPTII,S$GLB,, | Performed by: INTERNAL MEDICINE

## 2023-09-14 PROCEDURE — 3044F HG A1C LEVEL LT 7.0%: CPT | Mod: CPTII,S$GLB,, | Performed by: INTERNAL MEDICINE

## 2023-09-14 PROCEDURE — 3044F PR MOST RECENT HEMOGLOBIN A1C LEVEL <7.0%: ICD-10-PCS | Mod: CPTII,S$GLB,, | Performed by: INTERNAL MEDICINE

## 2023-09-14 PROCEDURE — 3074F PR MOST RECENT SYSTOLIC BLOOD PRESSURE < 130 MM HG: ICD-10-PCS | Mod: CPTII,S$GLB,, | Performed by: INTERNAL MEDICINE

## 2023-09-14 PROCEDURE — 4010F PR ACE/ARB THEARPY RXD/TAKEN: ICD-10-PCS | Mod: CPTII,S$GLB,, | Performed by: INTERNAL MEDICINE

## 2023-09-14 PROCEDURE — 3008F BODY MASS INDEX DOCD: CPT | Mod: CPTII,S$GLB,, | Performed by: INTERNAL MEDICINE

## 2023-09-14 PROCEDURE — 99204 OFFICE O/P NEW MOD 45 MIN: CPT | Mod: S$GLB,,, | Performed by: INTERNAL MEDICINE

## 2023-09-14 PROCEDURE — 99999 PR PBB SHADOW E&M-EST. PATIENT-LVL V: CPT | Mod: PBBFAC,,, | Performed by: INTERNAL MEDICINE

## 2023-09-14 NOTE — PROGRESS NOTES
"Subjective:   @Patient ID:  Apollo Nguyen is a 63 y.o. male who presents for evaluation of Bradycardia      HPI:   2023: Initial evaluation. He was Hospitalized with MARCELA. He has asymptomatic SR and RBBB. According to him he always has slow HR. No syncope or pre syncope. No CP.     HIV on TTT     PATRIC not compliant with CPAP       Prior cardiovascular  Hx  --------------------------------  - ECHO 2023       Left Ventricle: The left ventricle is normal in size. Normal wall motion. Ejection fraction by visual approximation is 55%. There is normal diastolic function.    Right Ventricle: Systolic function is normal.    IVC/SVC: Normal venous pressure at 3 mmHg.       - EKG  2023 SR, RBBB   - EKG  2023 SB RBBB             Patient Active Problem List    Diagnosis Date Noted    MARCELA (acute kidney injury) 2023    GERD (gastroesophageal reflux disease) 2023    HIV disease 2023    Acute renal failure with tubular necrosis 2023    Hypertension 2023    Hypotension 2023    Class 1 obesity in adult 2023    PATRIC (obstructive sleep apnea)            Right Arm BP - Sittin/76  Left Arm BP - Sittin/76        LAST HbA1c  Lab Results   Component Value Date    HGBA1C 5.6 2023       Lipid panel  No results found for: "CHOL"  No results found for: "HDL"  No results found for: "LDLCALC"  No results found for: "TRIG"  No results found for: "CHOLHDL"         Review of Systems   Constitutional: Negative for chills and fever.   HENT:  Negative for hearing loss and nosebleeds.    Eyes:  Negative for blurred vision.   Cardiovascular:  Negative for chest pain, leg swelling and palpitations.   Respiratory:  Negative for hemoptysis and shortness of breath.    Hematologic/Lymphatic: Negative for bleeding problem.   Skin:  Negative for itching.   Musculoskeletal:  Negative for falls.   Gastrointestinal:  Negative for abdominal pain and hematochezia. "   Genitourinary:  Negative for hematuria.   Neurological:  Negative for dizziness and loss of balance.   Psychiatric/Behavioral:  Negative for altered mental status and depression.        Objective:   Physical Exam  Constitutional:       Appearance: He is well-developed. He is obese.   HENT:      Head: Normocephalic and atraumatic.   Eyes:      Conjunctiva/sclera: Conjunctivae normal.   Neck:      Vascular: No carotid bruit or JVD.   Cardiovascular:      Rate and Rhythm: Regular rhythm. Bradycardia present.      Pulses:           Carotid pulses are 2+ on the right side and 2+ on the left side.       Radial pulses are 2+ on the right side and 2+ on the left side.      Heart sounds: Normal heart sounds. No murmur heard.     No friction rub. No gallop.   Pulmonary:      Effort: Pulmonary effort is normal. No respiratory distress.      Breath sounds: Normal breath sounds. No stridor. No wheezing.   Musculoskeletal:      Cervical back: Neck supple.   Skin:     General: Skin is warm and dry.   Neurological:      Mental Status: He is alert and oriented to person, place, and time.   Psychiatric:         Behavior: Behavior normal.         Assessment:     1. Hypertension, unspecified type    2. Class 1 obesity due to excess calories without serious comorbidity with body mass index (BMI) of 34.0 to 34.9 in adult    3. HIV disease    4. PATRIC (obstructive sleep apnea)    5. Bradycardia    6. MARCELA (acute kidney injury)    7. Weak urinary stream        Plan:   Chronic asymptomatic SB and RBBB.  Recent Echo   Avoid AVN blocking agents  Compliance with CPAP discussed. Will refer to sleep clinic for PATRIC management   WT loss  No further cardiac work up at this time  F/U with urology for urologic symptoms, MARCELA     Pertinent cardiac images and EKG reviewed independently.    Continue with current medical plan and lifestyle changes.  Return sooner for concerns or questions. If symptoms persist go to the ED  I have reviewed all pertinent  data including patient's medical history in detail and updated the computerized patient record.     Orders Placed This Encounter   Procedures    Ambulatory referral/consult to Urology     Standing Status:   Future     Standing Expiration Date:   10/14/2024     Referral Priority:   Routine     Referral Type:   Consultation     Referral Reason:   Specialty Services Required     Requested Specialty:   Urology     Number of Visits Requested:   1    Ambulatory referral/consult to Sleep Disorders     Standing Status:   Future     Standing Expiration Date:   10/14/2024     Referral Priority:   Routine     Referral Type:   Consultation     Requested Specialty:   Sleep Medicine     Number of Visits Requested:   1       Follow up as scheduled.     He expressed verbal understanding and agreed with the plan    Patient's Medications   New Prescriptions    No medications on file   Previous Medications    ACETAMINOPHEN (TYLENOL) 500 MG TABLET    Take 500 mg by mouth every 6 (six) hours as needed for Pain.    ASPIRIN (ECOTRIN) 81 MG EC TABLET    Take 81 mg by mouth every evening.    ATORVASTATIN (LIPITOR) 10 MG TABLET    Take 10 mg by mouth.    NTNNKFXFM-CMNXFYYH-UUSZQXN ALA (BIKTARVY) -25 MG (25 KG OR GREATER)    Take 1 tablet by mouth once daily.    CHOLECALCIFEROL, VITAMIN D3, 125 MCG (5,000 UNIT) TAB    Take 5,000 Units by mouth once daily.    LISINOPRIL (PRINIVIL,ZESTRIL) 5 MG TABLET    Take 1 tablet (5 mg total) by mouth once daily.    LORATADINE (CLARITIN) 10 MG TABLET    Take 10 mg by mouth daily as needed.    METFORMIN (GLUCOPHAGE-XR) 500 MG ER 24HR TABLET    Take 500 mg by mouth every evening.    MULTIVITAMIN (THERAGRAN) PER TABLET    Take 1 tablet by mouth once daily.    OMEGA-3S-DHA-EPA-FISH -110-680 MG CAP    Take 1 capsule by mouth once daily.    PANTOPRAZOLE (PROTONIX) 20 MG TABLET    Take 20 mg by mouth once daily.    SILDENAFIL (VIAGRA) 50 MG TABLET    TAKE 1 TO 2 TABLETS BY MOUTH EVERY 72 HOURS     TAMSULOSIN (FLOMAX) 0.4 MG CAP    Take 0.4 mg by mouth every evening.    TESTOSTERONE (ANDROGEL) 1 % (50 MG/5 GRAM) GLPK    Place 5 g onto the skin once daily.   Modified Medications    No medications on file   Discontinued Medications    No medications on file

## 2023-09-21 ENCOUNTER — OFFICE VISIT (OUTPATIENT)
Dept: URGENT CARE | Facility: CLINIC | Age: 63
End: 2023-09-21
Payer: COMMERCIAL

## 2023-09-21 VITALS
BODY MASS INDEX: 35.26 KG/M2 | OXYGEN SATURATION: 95 % | RESPIRATION RATE: 20 BRPM | DIASTOLIC BLOOD PRESSURE: 65 MMHG | TEMPERATURE: 97 F | HEART RATE: 60 BPM | SYSTOLIC BLOOD PRESSURE: 116 MMHG | WEIGHT: 211.63 LBS | HEIGHT: 65 IN

## 2023-09-21 DIAGNOSIS — M25.562 PAIN AND SWELLING OF LEFT KNEE: Primary | ICD-10-CM

## 2023-09-21 DIAGNOSIS — M25.462 PAIN AND SWELLING OF LEFT KNEE: Primary | ICD-10-CM

## 2023-09-21 PROCEDURE — 73562 XR KNEE 3 VIEW LEFT: ICD-10-PCS | Mod: FY,LT,S$GLB, | Performed by: RADIOLOGY

## 2023-09-21 PROCEDURE — 73562 X-RAY EXAM OF KNEE 3: CPT | Mod: FY,LT,S$GLB, | Performed by: RADIOLOGY

## 2023-09-21 PROCEDURE — 99204 OFFICE O/P NEW MOD 45 MIN: CPT | Mod: S$GLB,,, | Performed by: NURSE PRACTITIONER

## 2023-09-21 PROCEDURE — 99204 PR OFFICE/OUTPT VISIT, NEW, LEVL IV, 45-59 MIN: ICD-10-PCS | Mod: S$GLB,,, | Performed by: NURSE PRACTITIONER

## 2023-09-21 NOTE — PROGRESS NOTES
"Subjective:      Patient ID: Apollo Nguyen is a 63 y.o. male.    Vitals:  height is 5' 5" (1.651 m) and weight is 96 kg (211 lb 10.3 oz). His oral temperature is 97 °F (36.1 °C). His blood pressure is 116/65 and his pulse is 60. His respiration is 20 and oxygen saturation is 95%.     Chief Complaint: Knee Pain    This is a 63 y.o. male who presents today with a chief complaint of left knee pain and swelling swelling for 4 days . Denies any injuries.  Denies Numbness or tingling, , denies fever, body aches or chills, denies cough, wheezing or shortness of breath, denies nausea, vomiting, diarrhea or abdominal pain, denies chest pain or dizziness positional lightheadedness, denies sore throat or trouble swallowing, denies loss of taste or smell, or any other symptoms        Knee Pain   The incident occurred 3 to 5 days ago. There was no injury mechanism. The pain is present in the left knee. The pain is at a severity of 3/10. The pain is mild.       Musculoskeletal:  Positive for pain, joint pain and joint swelling.      Objective:     Physical Exam   Constitutional: He is oriented to person, place, and time. He appears well-developed. He is cooperative.  Non-toxic appearance. He does not appear ill. No distress.   HENT:   Head: Normocephalic and atraumatic. Head is without abrasion, without contusion and without laceration.   Ears:   Right Ear: Hearing, tympanic membrane, external ear and ear canal normal. No hemotympanum.   Left Ear: Hearing, tympanic membrane, external ear and ear canal normal. No hemotympanum.   Nose: Nose normal. No mucosal edema, rhinorrhea or nasal deformity. No epistaxis. Right sinus exhibits no maxillary sinus tenderness and no frontal sinus tenderness. Left sinus exhibits no maxillary sinus tenderness and no frontal sinus tenderness.   Mouth/Throat: Uvula is midline, oropharynx is clear and moist and mucous membranes are normal. No trismus in the jaw. Normal dentition. No " uvula swelling. No posterior oropharyngeal erythema.   Eyes: Conjunctivae, EOM and lids are normal. Pupils are equal, round, and reactive to light. Right eye exhibits no discharge. Left eye exhibits no discharge. No scleral icterus.   Neck: Trachea normal and phonation normal. Neck supple. No tracheal deviation present. No neck rigidity present. No spinous process tenderness present. No muscular tenderness present.   Cardiovascular: Normal rate, regular rhythm, normal heart sounds and normal pulses.   Pulmonary/Chest: Effort normal and breath sounds normal. No respiratory distress.   Abdominal: Normal appearance and bowel sounds are normal. He exhibits no distension and no mass. Soft. There is no abdominal tenderness.   Musculoskeletal: Normal range of motion.         General: No deformity. Normal range of motion.      Left knee: He exhibits swelling. He exhibits normal range of motion and no laceration. Tenderness (to lateral and medial joint line) found.      Comments: No clicking, popping or crepitus noted  Able to extend knee without difficulty  NV intact        Neurological: He is alert and oriented to person, place, and time. He has normal strength. No cranial nerve deficit or sensory deficit. He exhibits normal muscle tone. He displays no seizure activity. Coordination normal. GCS eye subscore is 4. GCS verbal subscore is 5. GCS motor subscore is 6.   Skin: Skin is warm, dry, intact, not diaphoretic and not pale. Capillary refill takes less than 2 seconds. not left kneeNo abrasion, No burn, No bruising and No ecchymosis   Psychiatric: His speech is normal and behavior is normal. Judgment and thought content normal.   Nursing note and vitals reviewed.  X-Ray Knee 3 View Left    Result Date: 9/21/2023  EXAMINATION: XR KNEE 3 VIEW LEFT CLINICAL HISTORY: Pain in left knee TECHNIQUE: AP, lateral, and Merchant views of the left knee were performed. COMPARISON: 05/26/2020. FINDINGS: The bone mineralization is within  normal limits.  There is no cortical step-off.  There is no evidence of periostitis. The joint spaces are maintained.  There is marked soft tissue swelling along the anterior aspect of the left knee.  No radiopaque foreign body is identified. There is no evidence of a fracture or dislocation.     No evidence of a fracture or dislocation. Marked soft tissue swelling along the anterior aspect of the left knee. Electronically signed by: Pelon Harvey MD Date:    09/21/2023 Time:    13:06       Patient in no acute distress.  Vitals reassuring.  Discussed results/diagnosis/plan in depth with patient in clinic. Strict precautions given to patient to monitor for worsening signs and symptoms. Advised to follow up with primary.All questions answered. Strict ER precautions given. If your symptoms worsens or fail to improve you should go to the Emergency Room. Discharge and follow-up instructions given verbally/printed. Discharge and follow-up instructions discussed with the patient who expressed understanding and willingness to comply with my recommendations.Patient voiced understanding and in agreement with current treatment plan.     Please be advised this text was dictated with AnchorFree software and may contain errors due to translation.      Assessment:     1. Pain and swelling of left knee        Plan:       Pain and swelling of left knee  -     X-Ray Knee 3 View Left; Future; Expected date: 09/21/2023          Medical Decision Making:   Clinical Tests:   Lab Tests: Reviewed  Urgent Care Management:  Patient in no acute distress.  Vitals reassuring.  On exam, patient is nontoxic appearing and afebrile.  Lungs CTA.  X-ray results discussed with patient in detail.  No injury or trauma reported.  Evin advised.  Patient with recent history of MARCELA.  Advised to refrain from using over-the-counter ibuprofen or NSAIDs.   over-the-counter medication discussed with patient at length.  Proper hydration advised.  I reiterated the  importance of further evaluation if no improvement symptoms and follow-up with primary.           Patient Instructions     Rest - Rest the injured area, wear splint for the next week or two as needed for comfort    Ice - Apply ice  to affected area for the first 24-48 hours (DO NOT APPLY ICE DIRECTLY TO THE SKIN.  DO NOT LEAVE ON AFFECTED BODY PART FOR MORE THAN 15 MINUTES AT AT TIME TO AVOID INJURY TO SOFT TISSUE)     Compression - Wear ACE wrap or splint provided for compression and comfort to help reduce pain and swelling    Elevate - Elevated affected area higher than your heart to reduce swelling    -  If you were prescribed an anti-inflammatory, please take as directed as needed for pain and inflammation. If you were not prescribed an anti-inflammatory, you can take Tylenol or ibuprofen over the counter as directed for pain unless you have an allergy to them or medical condition such as stomach ulcers, kidney or liver disease or blood thinners etc for which you should not be taking these type of medications.     Follow up with your PCP in 1 week or as needed if no improvement.      Repeat X ray in 1 week if you still have pain.    If your condition worsens or fails to improve we recommend that you receive another evaluation at the ER immediately or contact your PCP to discuss your concerns or return here.     You must understand that you've received an urgent care treatment only and that you may be released before all your medical problems are known or treated.     You the patient will arrange for followup care as instructed.

## 2023-10-05 ENCOUNTER — LAB VISIT (OUTPATIENT)
Dept: LAB | Facility: HOSPITAL | Age: 63
End: 2023-10-05
Attending: INTERNAL MEDICINE
Payer: COMMERCIAL

## 2023-10-05 DIAGNOSIS — N18.30 STAGE 3 CHRONIC KIDNEY DISEASE, UNSPECIFIED WHETHER STAGE 3A OR 3B CKD: ICD-10-CM

## 2023-10-05 LAB
BILIRUB UR QL STRIP: NEGATIVE
CLARITY UR: CLEAR
COLOR UR: YELLOW
GLUCOSE UR QL STRIP: NEGATIVE
HGB UR QL STRIP: NEGATIVE
KETONES UR QL STRIP: NEGATIVE
LEUKOCYTE ESTERASE UR QL STRIP: NEGATIVE
NITRITE UR QL STRIP: NEGATIVE
PH UR STRIP: 8 [PH] (ref 5–8)
PROT UR QL STRIP: NEGATIVE
SP GR UR STRIP: 1.02 (ref 1–1.03)
URN SPEC COLLECT METH UR: NORMAL
UROBILINOGEN UR STRIP-ACNC: NEGATIVE EU/DL

## 2023-10-05 PROCEDURE — 81003 URINALYSIS AUTO W/O SCOPE: CPT | Performed by: INTERNAL MEDICINE

## 2023-11-06 PROBLEM — N17.0 ACUTE RENAL FAILURE WITH TUBULAR NECROSIS: Status: RESOLVED | Noted: 2023-08-04 | Resolved: 2023-11-06

## 2023-11-13 PROBLEM — N17.9 AKI (ACUTE KIDNEY INJURY): Status: RESOLVED | Noted: 2023-08-13 | Resolved: 2023-11-13

## 2023-12-07 ENCOUNTER — LAB VISIT (OUTPATIENT)
Dept: LAB | Facility: HOSPITAL | Age: 63
End: 2023-12-07
Attending: INTERNAL MEDICINE
Payer: COMMERCIAL

## 2023-12-07 DIAGNOSIS — N18.30 STAGE 3 CHRONIC KIDNEY DISEASE, UNSPECIFIED WHETHER STAGE 3A OR 3B CKD: ICD-10-CM

## 2023-12-07 LAB
25(OH)D3+25(OH)D2 SERPL-MCNC: 23 NG/ML (ref 30–96)
ANION GAP SERPL CALC-SCNC: 15 MMOL/L (ref 8–16)
BILIRUB UR QL STRIP: NEGATIVE
BUN SERPL-MCNC: 19 MG/DL (ref 8–23)
CALCIUM SERPL-MCNC: 9.1 MG/DL (ref 8.7–10.5)
CHLORIDE SERPL-SCNC: 103 MMOL/L (ref 95–110)
CLARITY UR: CLEAR
CO2 SERPL-SCNC: 23 MMOL/L (ref 23–29)
COLOR UR: YELLOW
CREAT SERPL-MCNC: 1 MG/DL (ref 0.5–1.4)
EST. GFR  (NO RACE VARIABLE): >60 ML/MIN/1.73 M^2
GLUCOSE SERPL-MCNC: 81 MG/DL (ref 70–110)
GLUCOSE UR QL STRIP: NEGATIVE
HGB UR QL STRIP: NEGATIVE
KETONES UR QL STRIP: NEGATIVE
LEUKOCYTE ESTERASE UR QL STRIP: NEGATIVE
NITRITE UR QL STRIP: NEGATIVE
PH UR STRIP: 6 [PH] (ref 5–8)
POTASSIUM SERPL-SCNC: 4 MMOL/L (ref 3.5–5.1)
PROT UR QL STRIP: NEGATIVE
SODIUM SERPL-SCNC: 141 MMOL/L (ref 136–145)
SP GR UR STRIP: 1.02 (ref 1–1.03)
URN SPEC COLLECT METH UR: NORMAL
UROBILINOGEN UR STRIP-ACNC: NEGATIVE EU/DL

## 2023-12-07 PROCEDURE — 80048 BASIC METABOLIC PNL TOTAL CA: CPT | Performed by: INTERNAL MEDICINE

## 2023-12-07 PROCEDURE — 81003 URINALYSIS AUTO W/O SCOPE: CPT | Performed by: INTERNAL MEDICINE

## 2023-12-07 PROCEDURE — 82306 VITAMIN D 25 HYDROXY: CPT | Performed by: INTERNAL MEDICINE

## 2023-12-07 PROCEDURE — 36415 COLL VENOUS BLD VENIPUNCTURE: CPT | Performed by: INTERNAL MEDICINE

## 2025-04-11 ENCOUNTER — TELEPHONE (OUTPATIENT)
Dept: SURGERY | Facility: CLINIC | Age: 65
End: 2025-04-11
Payer: COMMERCIAL

## 2025-04-11 DIAGNOSIS — C18.7 CANCER OF SIGMOID COLON: Primary | ICD-10-CM

## 2025-05-02 ENCOUNTER — TELEPHONE (OUTPATIENT)
Dept: SURGERY | Facility: CLINIC | Age: 65
End: 2025-05-02
Payer: COMMERCIAL

## 2025-05-02 DIAGNOSIS — C19 CANCER OF RECTOSIGMOID (COLON): Primary | ICD-10-CM

## 2025-05-02 NOTE — TELEPHONE ENCOUNTER
Contacted patient using language services,  # [phone] Adelfo. Lab appt scheduled per patient request.

## 2025-06-24 ENCOUNTER — LAB VISIT (OUTPATIENT)
Dept: LAB | Facility: HOSPITAL | Age: 65
End: 2025-06-24
Attending: COLON & RECTAL SURGERY
Payer: COMMERCIAL

## 2025-06-24 DIAGNOSIS — C19 CANCER OF RECTOSIGMOID (COLON): ICD-10-CM

## 2025-06-24 LAB
ABSOLUTE EOSINOPHIL (OHS): 0.29 K/UL
ABSOLUTE MONOCYTE (OHS): 0.5 K/UL (ref 0.3–1)
ABSOLUTE NEUTROPHIL COUNT (OHS): 2.54 K/UL (ref 1.8–7.7)
ALBUMIN SERPL BCP-MCNC: 4.4 G/DL (ref 3.5–5.2)
ALP SERPL-CCNC: 58 UNIT/L (ref 40–150)
ALT SERPL W/O P-5'-P-CCNC: 28 UNIT/L (ref 10–44)
ANION GAP (OHS): 10 MMOL/L (ref 8–16)
AST SERPL-CCNC: 17 UNIT/L (ref 11–45)
BASOPHILS # BLD AUTO: 0.03 K/UL
BASOPHILS NFR BLD AUTO: 0.4 %
BILIRUB SERPL-MCNC: 0.6 MG/DL (ref 0.1–1)
BUN SERPL-MCNC: 16 MG/DL (ref 8–23)
CALCIUM SERPL-MCNC: 8.9 MG/DL (ref 8.7–10.5)
CARCINOEMBRYONIC ANTIGEN (OHS): 2.3 NG/ML
CHLORIDE SERPL-SCNC: 104 MMOL/L (ref 95–110)
CO2 SERPL-SCNC: 25 MMOL/L (ref 23–29)
CREAT SERPL-MCNC: 1.1 MG/DL (ref 0.5–1.4)
ERYTHROCYTE [DISTWIDTH] IN BLOOD BY AUTOMATED COUNT: 13 % (ref 11.5–14.5)
GFR SERPLBLD CREATININE-BSD FMLA CKD-EPI: >60 ML/MIN/1.73/M2
GLUCOSE SERPL-MCNC: 111 MG/DL (ref 70–110)
HCT VFR BLD AUTO: 48.8 % (ref 40–54)
HGB BLD-MCNC: 16.2 GM/DL (ref 14–18)
IMM GRANULOCYTES # BLD AUTO: 0.04 K/UL (ref 0–0.04)
IMM GRANULOCYTES NFR BLD AUTO: 0.5 % (ref 0–0.5)
LYMPHOCYTES # BLD AUTO: 3.88 K/UL (ref 1–4.8)
MCH RBC QN AUTO: 30.3 PG (ref 27–31)
MCHC RBC AUTO-ENTMCNC: 33.2 G/DL (ref 32–36)
MCV RBC AUTO: 91 FL (ref 82–98)
NUCLEATED RBC (/100WBC) (OHS): 0 /100 WBC
PLATELET # BLD AUTO: 173 K/UL (ref 150–450)
PMV BLD AUTO: 11 FL (ref 9.2–12.9)
POTASSIUM SERPL-SCNC: 4.2 MMOL/L (ref 3.5–5.1)
PROT SERPL-MCNC: 7.5 GM/DL (ref 6–8.4)
RBC # BLD AUTO: 5.34 M/UL (ref 4.6–6.2)
RELATIVE EOSINOPHIL (OHS): 4 %
RELATIVE LYMPHOCYTE (OHS): 53.3 % (ref 18–48)
RELATIVE MONOCYTE (OHS): 6.9 % (ref 4–15)
RELATIVE NEUTROPHIL (OHS): 34.9 % (ref 38–73)
SODIUM SERPL-SCNC: 139 MMOL/L (ref 136–145)
WBC # BLD AUTO: 7.28 K/UL (ref 3.9–12.7)

## 2025-06-24 PROCEDURE — 82565 ASSAY OF CREATININE: CPT

## 2025-06-24 PROCEDURE — 36415 COLL VENOUS BLD VENIPUNCTURE: CPT

## 2025-06-24 PROCEDURE — 82378 CARCINOEMBRYONIC ANTIGEN: CPT

## 2025-06-24 PROCEDURE — 85025 COMPLETE CBC W/AUTO DIFF WBC: CPT

## 2025-06-27 ENCOUNTER — OFFICE VISIT (OUTPATIENT)
Facility: CLINIC | Age: 65
End: 2025-06-27
Payer: COMMERCIAL

## 2025-06-27 VITALS
RESPIRATION RATE: 18 BRPM | DIASTOLIC BLOOD PRESSURE: 77 MMHG | HEIGHT: 65 IN | SYSTOLIC BLOOD PRESSURE: 151 MMHG | HEART RATE: 63 BPM | BODY MASS INDEX: 38.05 KG/M2 | WEIGHT: 228.38 LBS

## 2025-06-27 DIAGNOSIS — C18.7 MALIGNANT NEOPLASM OF SIGMOID COLON: Primary | ICD-10-CM

## 2025-06-27 DIAGNOSIS — Z21 HIV INFECTION, UNSPECIFIED SYMPTOM STATUS: ICD-10-CM

## 2025-06-27 PROCEDURE — 99999 PR PBB SHADOW E&M-EST. PATIENT-LVL III: CPT | Mod: PBBFAC,,, | Performed by: COLON & RECTAL SURGERY

## 2025-06-27 NOTE — PROGRESS NOTES
"No chief complaint on file.          Apollo Nguyen is a 65 y.o. male who presents for evaluation of sigmoid colon cancer    Some concerns recently with an increasing creatinine and an increasing CEA     Both of those have decreased since then back to a normal level      Energy levels are reported as good      Appetite is reported is good      No new complaints at this time     Bowel movements daily, soft, toilet times minimal      Past Medical History:   Diagnosis Date    MARCELA (acute kidney injury)     CKD (chronic kidney disease) stage 3, GFR 30-59 ml/min     High cholesterol     HIV disease     Hypertension     Hypocalcemia     Hypophosphatemia     Osteopenia     Prostate disorder     Proteinuria        No past surgical history on file.    Social History[1]    No family history on file.       Scheduled Meds:   Continuous Infusions:   PRN Meds:.       Review of patient's allergies indicates:  No Known Allergies          Review of Systems   Please see review of systems scanned into the chart          BP (!) 151/77 (BP Location: Right arm, Patient Position: Sitting)   Pulse 63   Resp 18   Ht 5' 5" (1.651 m)   Wt 103.6 kg (228 lb 6.3 oz)   BMI 38.01 kg/m²       General:  Well nourished, no apparent distress   Skin:  Warm, dry   Eyes:  Pupils equally round and reactive to light and accommodation, extraocular muscles intact   Neck:  No jugular distention, no lymphadenopathy   Lungs:  Chest rises symmetrically, equal breath sounds bilaterally   Cardiac:  Regular rate and rhythm   Abdomen:  Soft, nondistended, nontender incisions well healed  Extremities:  Moving bilateral extremities symmetrically, strength intact   Psych:  Appropriate, cooperative            ASSESSMENT:   1. Malignant neoplasm of sigmoid colon        2. HIV infection, unspecified symptom status                    PLAN:      Patient's labs were reviewed with them and added to their cancer sheet     Creatinine and CEA have returned to " a normal level     Labs were ordered for the next appointment and instructions were given to the patient      Imaging and colonoscopies were reviewed with the patient      Screening protocols for cancer recurrence were discussed at length      Follow-up in 6 months      We discussed his HIV which is well controlled and undetectable         [1]   Social History  Socioeconomic History    Marital status:    Tobacco Use    Smoking status: Former    Smokeless tobacco: Never   Substance and Sexual Activity    Alcohol use: No    Drug use: No     Comment: used in the past     Social Drivers of Health     Financial Resource Strain: Low Risk  (6/5/2024)    Received from [facility]    Overall Financial Resource Strain (CARDIA)     Difficulty of Paying Living Expenses: Not hard at all   Food Insecurity: Unknown (6/5/2024)    Received from [facility]    Hunger Vital Sign     Worried About Running Out of Food in the Last Year: Never true   Transportation Needs: No Transportation Needs (6/5/2024)    Received from [facility]    PRAPARE - Transportation     Lack of Transportation (Medical): No     Lack of Transportation (Non-Medical): No   Physical Activity: Insufficiently Active (8/14/2023)    Exercise Vital Sign     Days of Exercise per Week: 2 days     Minutes of Exercise per Session: 40 min   Stress: No Stress Concern Present (8/14/2023)    [employer] - Occupational Stress Questionnaire     Feeling of Stress : Not at all   Housing Stability: Unknown (6/5/2024)    Received from [facility]    Housing Stability Vital Sign     Unable to Pay for Housing in the Last Year: No     Homeless in the Last Year: No